# Patient Record
Sex: FEMALE | Race: WHITE | NOT HISPANIC OR LATINO | Employment: PART TIME | URBAN - METROPOLITAN AREA
[De-identification: names, ages, dates, MRNs, and addresses within clinical notes are randomized per-mention and may not be internally consistent; named-entity substitution may affect disease eponyms.]

---

## 2017-02-09 ENCOUNTER — OFFICE VISIT (OUTPATIENT)
Dept: URGENT CARE | Facility: CLINIC | Age: 42
End: 2017-02-09
Payer: COMMERCIAL

## 2017-02-09 VITALS
HEART RATE: 90 BPM | BODY MASS INDEX: 33.67 KG/M2 | OXYGEN SATURATION: 99 % | RESPIRATION RATE: 16 BRPM | SYSTOLIC BLOOD PRESSURE: 130 MMHG | TEMPERATURE: 98.5 F | WEIGHT: 215 LBS | DIASTOLIC BLOOD PRESSURE: 90 MMHG

## 2017-02-09 DIAGNOSIS — R51.9 RECURRENT HEADACHE: ICD-10-CM

## 2017-02-09 PROCEDURE — 99214 OFFICE O/P EST MOD 30 MIN: CPT | Mod: 25 | Performed by: FAMILY MEDICINE

## 2017-02-09 RX ORDER — KETOROLAC TROMETHAMINE 30 MG/ML
60 INJECTION, SOLUTION INTRAMUSCULAR; INTRAVENOUS ONCE
Qty: 2 ML | Refills: 0 | OUTPATIENT
Start: 2017-02-09 | End: 2017-02-09

## 2017-02-09 RX ORDER — KETOROLAC TROMETHAMINE 30 MG/ML
60 INJECTION, SOLUTION INTRAMUSCULAR; INTRAVENOUS ONCE
Status: COMPLETED | OUTPATIENT
Start: 2017-02-09 | End: 2017-02-09

## 2017-02-09 RX ORDER — IBUPROFEN 200 MG
800 TABLET ORAL 2 TIMES DAILY
COMMUNITY
End: 2017-05-12

## 2017-02-09 RX ORDER — SUMATRIPTAN 50 MG/1
50 TABLET, FILM COATED ORAL
Qty: 10 TAB | Refills: 3 | Status: SHIPPED | OUTPATIENT
Start: 2017-02-09 | End: 2019-03-15

## 2017-02-09 RX ADMIN — KETOROLAC TROMETHAMINE 60 MG: 30 INJECTION, SOLUTION INTRAMUSCULAR; INTRAVENOUS at 19:00

## 2017-02-09 ASSESSMENT — ENCOUNTER SYMPTOMS
EYE REDNESS: 0
LOSS OF BALANCE: 0
FACIAL SWEATING: 0
MIGRAINE HEADACHES: 0
INSOMNIA: 1
EYE PAIN: 1
BLURRED VISION: 1
EYE WATERING: 0
FEVER: 0
SCALP TENDERNESS: 0
ANOREXIA: 1
VOMITING: 0
HEADACHES: 1
SINUS PRESSURE: 0
DIZZINESS: 0
PHOTOPHOBIA: 0
NAUSEA: 0
NECK PAIN: 0
TINGLING: 0

## 2017-02-09 NOTE — MR AVS SNAPSHOT
Leslie Diaz Aravind   2017 5:45 PM   Office Visit   MRN: [id number]    Department:  [facility]   Dept Phone:  [phone]    Description:  Female : 1975   Provider:  José Miguel Mobley M.D.           Reason for Visit     Headache x 5 wks, headache on Rt. side of face behind Rt. eye, blurred vision today      Allergies as of 2017     Allergen Noted Reactions    Nkda [No Known Drug Allergy] 2008         You were diagnosed with     Recurrent headache   [916425]         Vital Signs     Blood Pressure Pulse Temperature Respirations Weight Oxygen Saturation    130/90 mmHg 90 36.9 °C (98.5 °F) 16 97.523 kg (215 lb) 99%    Smoking Status                   Never Smoker            Basic Information     Date Of Birth Sex Race Ethnicity Preferred Language    1975 Female White Non- English      Problem List              ICD-10-CM Priority Class Noted - Resolved    Depression F32.9   Unknown - Present    HTN (hypertension) I10   Unknown - Present    Hypertriglyceridemia E78.1   3/20/2013 - Present    Other specified disorder of gallbladder K82.8   2013 - Present    Abdominal adhesions K66.0   2014 - Present    HTN (hypertension) I10   2014 - Present    Pain in joint, pelvic region and thigh M25.559   2014 - Present      Health Maintenance        Date Due Completion Dates    IMM DTaP/Tdap/Td Vaccine (1 - Tdap) 3/23/1994 ---    PAP SMEAR 3/23/1996 ---    MAMMOGRAM 3/23/2015 ---    IMM INFLUENZA (1) 2016 ---            Current Immunizations     No immunizations on file.      Below and/or attached are the medications your provider expects you to take. Review all of your home medications and newly ordered medications with your provider and/or pharmacist. Follow medication instructions as directed by your provider and/or pharmacist. Please keep your medication list with you and share with your provider. Update the information when medications are discontinued,  doses are changed, or new medications (including over-the-counter products) are added; and carry medication information at all times in the event of emergency situations     Allergies:  NKDA - (reactions not documented)               Medications  Valid as of: February 09, 2017 -  7:14 PM    Generic Name Brand Name Tablet Size Instructions for use    AmLODIPine Besylate (Tab) NORVASC 10 MG Take 1 Tab by mouth every day.        Aspirin-Acetaminophen-Caffeine (Tab) EXCEDRIN 250-250-65 MG Take 1 Tab by mouth every 6 hours as needed for Headache.        Ciprofloxacin HCl (Tab) CIPRO 500 MG Take 1 Tab by mouth 2 times a day.        Dicyclomine HCl (Cap) BENTYL 10 MG Take 1 Cap by mouth 4 Times a Day,Before Meals and at Bedtime.        HydroCHLOROthiazide (Tab) HYDRODIURIL 50 MG TAKE 1 TABLET BY MOUTH EVERY DAY        Ibuprofen (Tab) MOTRIN 200 MG Take 800 mg by mouth 2 Times a Day.        Ketorolac Tromethamine (Solution) TORADOL 60 MG/2ML 2 mL by Intramuscular route Once for 1 dose.        Metoprolol Succinate (TABLET SR 24 HR) TOPROL  MG Take 1 Tab by mouth every day.        Oxycodone-Acetaminophen (Tab) PERCOCET 7.5-325 MG Take 1 Tab by mouth every four hours as needed.        Potassium Chloride Cyndi CR (Tab CR) Kdur 20 MEQ Take 1 Tab by mouth every day.        .                 Medicines prescribed today were sent to:     [facility] #[id number] - [place] - [address] [address]    Phone: [phone] Fax: [phone]    Open 24 Hours?: No      Medication refill instructions:       If your prescription bottle indicates you have medication refills left, it is not necessary to call your provider’s office. Please contact your pharmacy and they will refill your medication.    If your prescription bottle indicates you do not have any refills left, you may request refills at any time through one of the following ways: The online [employer] system (except Urgent Care), by calling your provider’s office,  or by asking your pharmacy to contact your provider’s office with a refill request. Medication refills are processed only during regular business hours and may not be available until the next business day. Your provider may request additional information or to have a follow-up visit with you prior to refilling your medication.   *Please Note: Medication refills are assigned a new Rx number when refilled electronically. Your pharmacy may indicate that no refills were authorized even though a new prescription for the same medication is available at the pharmacy. Please request the medicine by name with the pharmacy before contacting your provider for a refill.           Rumble Access Code: [email or web address]  Expires: 3/11/2017  7:14 PM    Rumble  A secure, online tool to manage your health information     [employer] Rumble® is a secure, online tool that connects you to your personalized health information from the privacy of your home -- day or night - making it very easy for you to manage your healthcare. Once the activation process is completed, you can even access your medical information using the Rumble andrea, which is available for free in the Apple Andrea store or Google Play store.     Rumble provides the following levels of access (as shown below):   My Chart Features   [facility] Doctor [facility]  Urgent  Care Non-[facility]  Primary Care  Doctor   Email your healthcare team securely and privately 24/7 X X X    Manage appointments: schedule your next appointment; view details of past/upcoming appointments X      Request prescription refills. X      View recent personal medical records, including lab and immunizations X X X X   View health record, including health history, allergies, medications X X X X   Read reports about your outpatient visits, procedures, consult and ER notes X X X X   See your discharge summary, which is a recap of your hospital and/or ER visit that includes  your diagnosis, lab results, and care plan. X X       How to register for [employer]:  1. Go to  [email or web address].  2. Click on the Sign Up Now box, which takes you to the New Member Sign Up page. You will need to provide the following information:  a. Enter your [email or web address] Access Code exactly as it appears at the top of this page. (You will not need to use this code after you’ve completed the sign-up process. If you do not sign up before the expiration date, you must request a new code.)   b. Enter your date of birth.   c. Enter your home email address.   d. Click Submit, and follow the next screen’s instructions.  3. Create a [email or web address] ID. This will be your [email or web address] login ID and cannot be changed, so think of one that is secure and easy to remember.  4. Create a [email or web address] password. You can change your password at any time.  5. Enter your Password Reset Question and Answer. This can be used at a later time if you forget your password.   6. Enter your e-mail address. This allows you to receive e-mail notifications when new information is available in [employer].  7. Click Sign Up. You can now view your health information.    For assistance activating your [employer] account, call [phone]

## 2017-02-10 NOTE — PROGRESS NOTES
Subjective:      Leslie Nazario is a 41 y.o. female who presents with Headache            Headache   This is a new problem. The current episode started more than 1 month ago. The problem occurs intermittently (2 times per week, last a few hours). The problem has been waxing and waning. The pain is located in the right unilateral region. The pain does not radiate. The pain quality is not similar to prior headaches. The quality of the pain is described as aching. The pain is at a severity of 6/10. The pain is moderate. Associated symptoms include anorexia, blurred vision, eye pain and insomnia. Pertinent negatives include no dizziness, drainage, ear pain, eye redness, eye watering, facial sweating, fever, loss of balance, nausea, neck pain, phonophobia, photophobia, scalp tenderness, sinus pressure, tingling, tinnitus or vomiting. Nothing aggravates the symptoms. She has tried NSAIDs for the symptoms. The treatment provided moderate relief. There is no history of migraine headaches or migraines in the family.       Review of Systems   Constitutional: Negative for fever.   HENT: Negative for ear pain, sinus pressure and tinnitus.    Eyes: Positive for blurred vision and pain. Negative for photophobia and redness.   Gastrointestinal: Positive for anorexia. Negative for nausea and vomiting.   Musculoskeletal: Negative for neck pain.   Neurological: Positive for headaches. Negative for dizziness, tingling and loss of balance.   Psychiatric/Behavioral: The patient has insomnia.      PMH:  has a past medical history of S/p nephrectomy (2010); Renal disorder; Pain (01-24-14); Pain; Depression; and HTN.  MEDS:   Current outpatient prescriptions:   •  ibuprofen (MOTRIN IB) 200 MG Tab, Take 800 mg by mouth 2 Times a Day., Disp: , Rfl:   •  asa/apap/caffeine (EXCEDRIN) 250-250-65 MG Tab, Take 1 Tab by mouth every 6 hours as needed for Headache., Disp: , Rfl:   •  hydrochlorothiazide (HYDRODIURIL) 50 MG Tab, TAKE 1 TABLET  BY MOUTH EVERY DAY, Disp: 30 Tab, Rfl: 0  •  oxycodone-acetaminophen (PERCOCET) 7.5-325 MG per tablet, Take 1 Tab by mouth every four hours as needed., Disp: 30 Tab, Rfl: 0  •  ciprofloxacin (CIPRO) 500 MG Tab, Take 1 Tab by mouth 2 times a day., Disp: 20 Tab, Rfl: 0  •  dicyclomine (BENTYL) 10 MG Cap, Take 1 Cap by mouth 4 Times a Day,Before Meals and at Bedtime., Disp: 30 Cap, Rfl: 3  •  potassium chloride SA (K-DUR) 20 MEQ TBCR, Take 1 Tab by mouth every day., Disp: 30 Tab, Rfl: 5  •  amlodipine (NORVASC) 10 MG TABS, Take 1 Tab by mouth every day., Disp: 30 Tab, Rfl: 5  •  metoprolol SR (TOPROL XL) 100 MG TB24, Take 1 Tab by mouth every day., Disp: 30 Tab, Rfl: 5  ALLERGIES:   Allergies   Allergen Reactions   • Nkda [No Known Drug Allergy]      SURGHX:   Past Surgical History   Procedure Laterality Date   • Lysis adhesions general  5/06   • Ir-ureteral stent antegrade       R side   • Ureteral reimplantation  4/23/08     Performed by KIMBERLY PALACIOS at SUR[facility]ERY [facility]   • Stent placement  4/23/08     Performed by KIMBERLY PALACIOS at [facility]   • Ureteroscopy       partial removal of ureter with reimplantation   • Cystoscopy stent placement  8/11/2009     Performed by KIMBERLY PALACIOS at SURGERY [facility]   • Ureteroscopy  8/11/2009     Performed by KIMBERLY PALACIOS at SURGERY [facility]   • Stone retrieval  8/11/2009     Performed by KIMBERLY PALACIOS at SUR[facility]ERY [facility]   • Stent removal  8/11/2009     Performed by KIMBERLY PALACIOS at SURGERY [facility]   • Nephrectomy laparoscopic  2010     Right- Performed by NINA CORREA at [facility]   • Jannette by laparoscopy  12/11/2013     Performed by Ihsan Dior M.D. at [facility]   • Abdominal hysterectomy total  2005     Hysterectomy, Total Abdominal   • Cholecystectomy  2013   • Appendectomy  1991   • Laparoscopy  1/27/2014     Performed by Ihsan Dior M.D. at [facility]   • Lysis adhesions general  1/27/2014     Performed by Ihsan Dior M.D. at SURGERY [facility]   • Pr nephrectomy  2010   • Tonsillectomy  2004   • Laparotomy  2005   • Laparotomy  2009     bladder and ureter reconstruction   • Other  [id number]     \"multiple procedures prior to nephrectom , stent placement/ nephrostomy tube    • Hip arthroscopy  12/18/2014     Performed by Benji Lott M.D. at SURGERY [facility]   • Femoral neck osteoplasty  12/18/2014     Performed by Benji Lott M.D. at [facility]   • Acetabular osteoplasty  12/18/2014     Performed by Benji Lott M.D. at [facility]   • Synovectomy  12/18/2014     Performed by Benji Lott M.D. at [facility]     SOCHX:  reports that she has never smoked. She has never used smokeless tobacco. She reports that she drinks alcohol. She reports that she does not use illicit drugs.  FH: Family history was reviewed, no pertinent findings to report       Objective:     /90 mmHg  Pulse 90  Temp(Src) 36.9 °C (98.5 °F)  Resp 16  Wt 97.523 kg (215 lb)  SpO2 99%     Physical Exam   Constitutional: She appears well-developed.   HENT:   Head: Normocephalic.   Right Ear: External ear normal.   Left Ear: External ear normal.   Mouth/Throat: Oropharyngeal exudate present.   Nasal congestion   Eyes: Pupils are equal, round, and reactive to light. Right eye exhibits no chemosis and no discharge. Left eye exhibits no chemosis and no discharge. Right eye exhibits normal extraocular motion and no nystagmus. Left eye exhibits normal extraocular motion and no nystagmus. Right pupil is round and reactive. Left pupil is round and reactive.   Neck: Neck supple. No thyromegaly present.   Cardiovascular: Normal rate.    Pulmonary/Chest: Effort normal. No respiratory distress.   Abdominal: Soft. She exhibits no distension. There is no tenderness. There is no guarding. "   Lymphadenopathy:     She has no cervical adenopathy.   Neurological: She is alert. She has normal strength. She displays no tremor. No cranial nerve deficit or sensory deficit. She exhibits normal muscle tone. She displays a negative Romberg sign. She displays no seizure activity. Coordination and gait normal. GCS eye subscore is 4. GCS verbal subscore is 5. GCS motor subscore is 6.   Normal rapid hand movement, upper and lower body strength    Skin: Skin is warm and dry. No erythema.   Psychiatric: She has a normal mood and affect. Her behavior is normal.               Assessment/Plan:     1. Recurrent headache  sumatriptan (IMITREX) 50 MG Tab    ketorolac (TORADOL) injection 60 mg    DISCONTINUED: ketorolac (TORADOL) 60 MG/2ML Solution     Supportive care  Push fluids  Monitor temperature  Follow-up if symptoms worsen or fail to improve  F/u with PCP  If she shows no improvement with triptan, consider neuroimaging at that time  Also recommend f/u with her eyecare provider

## 2017-05-05 ENCOUNTER — OFFICE VISIT (OUTPATIENT)
Dept: URGENT CARE | Facility: PHYSICIAN GROUP | Age: 42
End: 2017-05-05
Payer: COMMERCIAL

## 2017-05-05 VITALS
HEART RATE: 107 BPM | OXYGEN SATURATION: 97 % | WEIGHT: 210 LBS | SYSTOLIC BLOOD PRESSURE: 148 MMHG | TEMPERATURE: 100 F | BODY MASS INDEX: 32.96 KG/M2 | DIASTOLIC BLOOD PRESSURE: 90 MMHG | HEIGHT: 67 IN

## 2017-05-05 DIAGNOSIS — J22 LOWER RESP. TRACT INFECTION: ICD-10-CM

## 2017-05-05 DIAGNOSIS — R05.9 COUGH: ICD-10-CM

## 2017-05-05 LAB
FLUAV+FLUBV AG SPEC QL IA: NEGATIVE
INT CON NEG: NEGATIVE
INT CON POS: POSITIVE

## 2017-05-05 PROCEDURE — 87804 INFLUENZA ASSAY W/OPTIC: CPT | Performed by: PHYSICIAN ASSISTANT

## 2017-05-05 PROCEDURE — 99214 OFFICE O/P EST MOD 30 MIN: CPT | Performed by: PHYSICIAN ASSISTANT

## 2017-05-05 RX ORDER — ALBUTEROL SULFATE 2.5 MG/3ML
2.5 SOLUTION RESPIRATORY (INHALATION) ONCE
OUTPATIENT
Start: 2017-05-05 | End: 2017-05-06

## 2017-05-05 RX ORDER — ALBUTEROL SULFATE 2.5 MG/3ML
2.5 SOLUTION RESPIRATORY (INHALATION) ONCE
Status: COMPLETED | OUTPATIENT
Start: 2017-05-05 | End: 2017-05-05

## 2017-05-05 RX ORDER — AZITHROMYCIN 250 MG/1
TABLET, FILM COATED ORAL
Qty: 6 TAB | Refills: 0 | Status: SHIPPED | OUTPATIENT
Start: 2017-05-05 | End: 2017-05-12

## 2017-05-05 RX ORDER — METHYLPREDNISOLONE 4 MG/1
TABLET ORAL
Qty: 1 KIT | Refills: 0 | Status: SHIPPED | OUTPATIENT
Start: 2017-05-05 | End: 2017-05-12

## 2017-05-05 RX ORDER — CODEINE PHOSPHATE AND GUAIFENESIN 10; 100 MG/5ML; MG/5ML
5 SOLUTION ORAL EVERY 4 HOURS PRN
Qty: 180 ML | Refills: 0 | Status: SHIPPED | OUTPATIENT
Start: 2017-05-05 | End: 2017-05-11

## 2017-05-05 RX ADMIN — ALBUTEROL SULFATE 2.5 MG: 2.5 SOLUTION RESPIRATORY (INHALATION) at 18:00

## 2017-05-05 ASSESSMENT — ENCOUNTER SYMPTOMS: COUGH: 1

## 2017-05-06 ASSESSMENT — ENCOUNTER SYMPTOMS
TINGLING: 0
MYALGIAS: 1
DIARRHEA: 0
DIZZINESS: 0
FEVER: 0
VOMITING: 0
FLANK PAIN: 0
WHEEZING: 0
EYE REDNESS: 0
ABDOMINAL PAIN: 0
SHORTNESS OF BREATH: 1
CHILLS: 1
HEADACHES: 1
EYE DISCHARGE: 0
NECK PAIN: 0
SORE THROAT: 1
SPUTUM PRODUCTION: 1
RHINORRHEA: 0

## 2017-05-06 ASSESSMENT — COPD QUESTIONNAIRES: COPD: 0

## 2017-05-06 NOTE — PROGRESS NOTES
Subjective:      Leslie Nazario is a 42 y.o. female who presents with Cough and Other          Pt is 43 y/o female who presents with dry cough for the last 5 days. She reports that what really brought her in, is her urine had a strong odor to it this morning and she is concerned as she is s/p nephrectomy due to renal stones.   She denies any urinary symptoms, only the strong odor.   Cough  This is a new problem. Episode onset: 5 days  The problem has been waxing and waning. The problem occurs every few minutes. The cough is non-productive. Associated symptoms include chills, headaches, myalgias, a sore throat and shortness of breath. Pertinent negatives include no chest pain, ear congestion, ear pain, eye redness, fever, postnasal drip, rash, rhinorrhea or wheezing. Associated symptoms comments: Pos. SOB after coughing fit.   . The symptoms are aggravated by cold air, lying down and dust. She has tried OTC cough suppressant for the symptoms. There is no history of asthma, COPD or pneumonia.   Other  Associated symptoms include chills, congestion, coughing, headaches, myalgias and a sore throat. Pertinent negatives include no abdominal pain, chest pain, fever, neck pain, rash or vomiting.   Denies any ill contacts.     Review of Systems   Constitutional: Positive for chills. Negative for fever and malaise/fatigue.   HENT: Positive for congestion and sore throat. Negative for ear discharge, ear pain, postnasal drip and rhinorrhea.    Eyes: Negative for discharge and redness.   Respiratory: Positive for cough, sputum production and shortness of breath. Negative for wheezing.    Cardiovascular: Negative for chest pain and leg swelling.   Gastrointestinal: Negative for vomiting, abdominal pain and diarrhea.   Genitourinary: Negative for dysuria, urgency, frequency, hematuria and flank pain.   Musculoskeletal: Positive for myalgias. Negative for neck pain.   Skin: Negative for itching and rash.   Neurological:  "Positive for headaches. Negative for dizziness and tingling.          Objective:     /90 mmHg  Pulse 107  Temp(Src) 37.8 °C (100 °F)  Ht 1.702 m (5' 7\")  Wt 95.255 kg (210 lb)  BMI 32.88 kg/m2  SpO2 97%   PMH:  has a past medical history of S/p nephrectomy (2010); Renal disorder; Pain (01-24-14); Pain; Depression; and HTN.  MEDS:   Current outpatient prescriptions:   •  azithromycin (ZITHROMAX) 250 MG Tab, Take 2 tabs today, then 1 tab daily til completed., Disp: 6 Tab, Rfl: 0  •  guaifenesin-codeine (ROBITUSSIN AC) Solution oral solution, Take 5 mL by mouth every four hours as needed for Cough (May cause sedation) for up to 6 days., Disp: 180 mL, Rfl: 0  •  MethylPREDNISolone (MEDROL DOSEPAK) 4 MG Tablet Therapy Pack, UAD, Disp: 1 Kit, Rfl: 0  •  ibuprofen (MOTRIN IB) 200 MG Tab, Take 800 mg by mouth 2 Times a Day., Disp: , Rfl:   •  asa/apap/caffeine (EXCEDRIN) 250-250-65 MG Tab, Take 1 Tab by mouth every 6 hours as needed for Headache., Disp: , Rfl:   •  sumatriptan (IMITREX) 50 MG Tab, Take 1 Tab by mouth Once PRN for Migraine for up to 1 dose., Disp: 10 Tab, Rfl: 3  •  hydrochlorothiazide (HYDRODIURIL) 50 MG Tab, TAKE 1 TABLET BY MOUTH EVERY DAY, Disp: 30 Tab, Rfl: 0  •  oxycodone-acetaminophen (PERCOCET) 7.5-325 MG per tablet, Take 1 Tab by mouth every four hours as needed., Disp: 30 Tab, Rfl: 0  •  ciprofloxacin (CIPRO) 500 MG Tab, Take 1 Tab by mouth 2 times a day., Disp: 20 Tab, Rfl: 0  •  dicyclomine (BENTYL) 10 MG Cap, Take 1 Cap by mouth 4 Times a Day,Before Meals and at Bedtime., Disp: 30 Cap, Rfl: 3  •  potassium chloride SA (K-DUR) 20 MEQ TBCR, Take 1 Tab by mouth every day., Disp: 30 Tab, Rfl: 5  •  amlodipine (NORVASC) 10 MG TABS, Take 1 Tab by mouth every day., Disp: 30 Tab, Rfl: 5  •  metoprolol SR (TOPROL XL) 100 MG TB24, Take 1 Tab by mouth every day., Disp: 30 Tab, Rfl: 5    Current facility-administered medications:   •  albuterol (PROVENTIL) 2.5mg/3ml nebulizer solution 2.5 mg, " "2.5 mg, Nebulization, Once, Emre Skaggs PA-C  •  albuterol (PROVENTIL) 2.5mg/3ml nebulizer solution 2.5 mg, 2.5 mg, Nebulization, Once, Emre Skaggs PA-C  ALLERGIES:   Allergies   Allergen Reactions   • Nkda [No Known Drug Allergy]      SURGHX:   Past Surgical History   Procedure Laterality Date   • Lysis adhesions general  5/06   • Ir-ureteral stent antegrade       R side   • Ureteral reimplantation  4/23/08     Performed by KIMBERLY PALACIOS at SURGERY [facility]   • Stent placement  4/23/08     Performed by KIMBERLY PALACIOS at [facility]   • Ureteroscopy       partial removal of ureter with reimplantation   • Cystoscopy stent placement  8/11/2009     Performed by KIMBERLY PALACIOS at [facility]   • Ureteroscopy  8/11/2009     Performed by KIMBERLY PALACIOS at [facility]   • Stone retrieval  8/11/2009     Performed by KIMBERLY PALACIOS at [facility]   • Stent removal  8/11/2009     Performed by KIMBERLY PALACIOS at [facility]   • Nephrectomy laparoscopic  2010     Right- Performed by NINA CORREA at [facility]   • Jannette by laparoscopy  12/11/2013     Performed by Ihsan Dior M.D. at [facility]   • Abdominal hysterectomy total  2005     Hysterectomy, Total Abdominal   • Cholecystectomy  2013   • Appendectomy  1991   • Laparoscopy  1/27/2014     Performed by Ihsan Dior M.D. at SURGERY [facility]   • Lysis adhesions general  1/27/2014     Performed by Ihsan Dior M.D. at [facility]   • Pr nephrectomy  2010   • Tonsillectomy  2004   • Laparotomy  2005   • Laparotomy  2009     bladder and ureter reconstruction   • Other  [id number]     \"multiple procedures prior to nephrectom , stent placement/ nephrostomy tube    • Hip arthroscopy  12/18/2014     Performed by Benji Lott M.D. at [facility]   • Femoral neck osteoplasty  12/18/2014     Performed by " Benji Lott M.D. at SURGERY [facility]   • Acetabular osteoplasty  12/18/2014     Performed by Benji Lott M.D. at [facility]   • Synovectomy  12/18/2014     Performed by Benji Lott M.D. at [facility]     SOCHX:  reports that she has never smoked. She has never used smokeless tobacco. She reports that she drinks alcohol. She reports that she does not use illicit drugs.  FH: Family history was reviewed, no pertinent findings to report    Physical Exam   Constitutional: She is oriented to person, place, and time. She appears well-developed and well-nourished.   HENT:   Head: Normocephalic and atraumatic.   Mouth/Throat: No oropharyngeal exudate.   Ears- Canals clear- TM- with clear fluid effusions bilaterally.   Pos. PND, with slight erythema- without tonsillar edema or exudate.   Mild discharge noted bilaterally- to nares.      Eyes: EOM are normal. Pupils are equal, round, and reactive to light.   Neck: Normal range of motion. Neck supple.   Cardiovascular: Normal rate and regular rhythm.    No murmur heard.  Pulmonary/Chest: Effort normal. No respiratory distress. She has wheezes.   Slight exp. Wheeze to left upper lung field.    Abdominal: Soft. Bowel sounds are normal. She exhibits no distension. There is no tenderness.   Neg. CVAT     Musculoskeletal: Normal range of motion. She exhibits no tenderness.   Lymphadenopathy:     She has no cervical adenopathy.   Neurological: She is alert and oriented to person, place, and time.   Skin: Skin is warm. No rash noted.   Psychiatric: She has a normal mood and affect. Her behavior is normal.   Vitals reviewed.            POC strep and influenza: negative.   UA- WNL.   Without any evidence of infection.   Declined any imaging at this time.     Assessment/Plan:     1. Lower resp. tract infection  - azithromycin (ZITHROMAX) 250 MG Tab; Take 2 tabs today, then 1 tab daily til completed.  Dispense: 6 Tab;  Refill: 0  - guaifenesin-codeine (ROBITUSSIN AC) Solution oral solution; Take 5 mL by mouth every four hours as needed for Cough (May cause sedation) for up to 6 days.  Dispense: 180 mL; Refill: 0  - MethylPREDNISolone (MEDROL DOSEPAK) 4 MG Tablet Therapy Pack; UAD  Dispense: 1 Kit; Refill: 0    2. Cough  - albuterol (PROVENTIL) 2.5mg/3ml nebulizer solution 2.5 mg; 3 mL by Nebulization route Once.  - POCT Influenza A/B  - albuterol (PROVENTIL) 2.5mg/3ml nebulizer solution 2.5 mg; 3 mL by Nebulization route Once.  - azithromycin (ZITHROMAX) 250 MG Tab; Take 2 tabs today, then 1 tab daily til completed.  Dispense: 6 Tab; Refill: 0  - guaifenesin-codeine (ROBITUSSIN AC) Solution oral solution; Take 5 mL by mouth every four hours as needed for Cough (May cause sedation) for up to 6 days.  Dispense: 180 mL; Refill: 0  - MethylPREDNISolone (MEDROL DOSEPAK) 4 MG Tablet Therapy Pack; UAD  Dispense: 1 Kit; Refill: 0    Pt had minimal improvement with cough with breathing treatment.   Contingent ABX was written for the patient to start based on guidelines discussed. Humidification. Increase fluids. Will trial steroid for relief.      NV  was reviewed by myself-  Document  does not reveal any concerning patterns. Pt. was advised to avoid the operation of heavy machine along with driving while on such medications. Finally pt. was advised to use medication only as prescribed.   Patient given precautionary s/sx that mandate immediate follow up and evaluation in the ED. Advised of risks of not doing so.    DDX, Supportive care, and indications for immediate follow-up discussed with patient.    Instructed to return to clinic or nearest emergency department if we are not available for any change in condition, further concerns, or worsening of symptoms.    The patient demonstrated a good understanding and agreed with the treatment plan.

## 2017-05-12 ENCOUNTER — OFFICE VISIT (OUTPATIENT)
Dept: URGENT CARE | Facility: PHYSICIAN GROUP | Age: 42
End: 2017-05-12
Payer: COMMERCIAL

## 2017-05-12 ENCOUNTER — HOSPITAL ENCOUNTER (OUTPATIENT)
Dept: RADIOLOGY | Facility: MEDICAL CENTER | Age: 42
End: 2017-05-12
Attending: FAMILY MEDICINE
Payer: COMMERCIAL

## 2017-05-12 VITALS
SYSTOLIC BLOOD PRESSURE: 136 MMHG | RESPIRATION RATE: 16 BRPM | WEIGHT: 210 LBS | TEMPERATURE: 97.7 F | DIASTOLIC BLOOD PRESSURE: 90 MMHG | HEIGHT: 67 IN | HEART RATE: 74 BPM | BODY MASS INDEX: 32.96 KG/M2 | OXYGEN SATURATION: 97 %

## 2017-05-12 DIAGNOSIS — R11.2 NAUSEA AND VOMITING, INTRACTABILITY OF VOMITING NOT SPECIFIED, UNSPECIFIED VOMITING TYPE: ICD-10-CM

## 2017-05-12 DIAGNOSIS — J20.9 ACUTE BRONCHITIS WITH BRONCHOSPASM: Primary | ICD-10-CM

## 2017-05-12 DIAGNOSIS — J20.9 ACUTE BRONCHITIS WITH BRONCHOSPASM: ICD-10-CM

## 2017-05-12 PROCEDURE — 99214 OFFICE O/P EST MOD 30 MIN: CPT | Performed by: FAMILY MEDICINE

## 2017-05-12 PROCEDURE — 71020 DX-CHEST-2 VIEWS: CPT

## 2017-05-12 RX ORDER — ALBUTEROL SULFATE 90 UG/1
2 AEROSOL, METERED RESPIRATORY (INHALATION) EVERY 6 HOURS PRN
Qty: 8.5 G | Refills: 3 | Status: SHIPPED | OUTPATIENT
Start: 2017-05-12 | End: 2019-03-15

## 2017-05-12 RX ORDER — PREDNISONE 20 MG/1
40 TABLET ORAL EVERY MORNING
Qty: 12 TAB | Refills: 0 | Status: SHIPPED | OUTPATIENT
Start: 2017-05-12 | End: 2017-05-18

## 2017-05-12 RX ORDER — IPRATROPIUM BROMIDE AND ALBUTEROL SULFATE 2.5; .5 MG/3ML; MG/3ML
3 SOLUTION RESPIRATORY (INHALATION) ONCE
Status: COMPLETED | OUTPATIENT
Start: 2017-05-12 | End: 2017-05-12

## 2017-05-12 RX ADMIN — IPRATROPIUM BROMIDE AND ALBUTEROL SULFATE 3 ML: 2.5; .5 SOLUTION RESPIRATORY (INHALATION) at 10:19

## 2017-05-12 ASSESSMENT — ENCOUNTER SYMPTOMS
ABDOMINAL PAIN: 0
SPUTUM PRODUCTION: 0
HEADACHES: 1
FOCAL WEAKNESS: 0
VOMITING: 1
NAUSEA: 1
COUGH: 1
SORE THROAT: 1
CHILLS: 0
FEVER: 0
DIZZINESS: 0
ORTHOPNEA: 0

## 2017-05-12 NOTE — MR AVS SNAPSHOT
"        Leslie Nazario   2017 9:25 AM   Office Visit   MRN: [id number]    Department:  [facility]   Dept Phone:  [phone]    Description:  Female : 1975   Provider:  Paxton Younger M.D.           Reason for Visit     Cough chest tightness, vomiting, was seen on  still not better      Allergies as of 2017     Allergen Noted Reactions    Nkda [No Known Drug Allergy] 2008         You were diagnosed with     Acute bronchitis with bronchospasm   [901124]  -  Primary     Nausea and vomiting, intractability of vomiting not specified, unspecified vomiting type   [[id number]]         Vital Signs     Blood Pressure Pulse Temperature Respirations Height Weight    136/90 mmHg 74 36.5 °C (97.7 °F) 16 1.702 m (5' 7.01\") 95.255 kg (210 lb)    Body Mass Index Oxygen Saturation Smoking Status             32.88 kg/m2 97% Never Smoker          Basic Information     Date Of Birth Sex Race Ethnicity Preferred Language    1975 Female White Non- English      Problem List              ICD-10-CM Priority Class Noted - Resolved    Depression F32.9   Unknown - Present    HTN (hypertension) I10   Unknown - Present    Hypertriglyceridemia E78.1   3/20/2013 - Present    Other specified disorder of gallbladder K82.8   2013 - Present    Abdominal adhesions K66.0   2014 - Present    HTN (hypertension) I10   2014 - Present    Pain in joint, pelvic region and thigh M25.559   2014 - Present      Health Maintenance        Date Due Completion Dates    IMM DTaP/Tdap/Td Vaccine (1 - Tdap) 3/23/1994 ---    PAP SMEAR 3/23/1996 ---    MAMMOGRAM 3/23/2015 ---            Current Immunizations     No immunizations on file.      Below and/or attached are the medications your provider expects you to take. Review all of your home medications and newly ordered medications with your provider and/or pharmacist. Follow medication instructions as directed by your provider and/or pharmacist. " Please keep your medication list with you and share with your provider. Update the information when medications are discontinued, doses are changed, or new medications (including over-the-counter products) are added; and carry medication information at all times in the event of emergency situations     Allergies:  NKDA - (reactions not documented)               Medications  Valid as of: May 12, 2017 - 10:59 AM    Generic Name Brand Name Tablet Size Instructions for use    Albuterol Sulfate (Aero Soln) albuterol 108 (90 BASE) MCG/ACT Inhale 2 Puffs by mouth every 6 hours as needed for Shortness of Breath.        AmLODIPine Besylate (Tab) NORVASC 10 MG Take 1 Tab by mouth every day.        Aspirin-Acetaminophen-Caffeine (Tab) EXCEDRIN 250-250-65 MG Take 1 Tab by mouth every 6 hours as needed for Headache.        HydroCHLOROthiazide (Tab) HYDRODIURIL 50 MG TAKE 1 TABLET BY MOUTH EVERY DAY        Hydrocod Polst-Chlorphen Polst (Suspension Extended Release) TUSSIONEX 10-8 MG/5ML Take 5 mL by mouth every 12 hours as needed for Cough.        Metoprolol Succinate (TABLET SR 24 HR) TOPROL  MG Take 1 Tab by mouth every day.        Potassium Chloride Cyndi CR (Tab CR) Kdur 20 MEQ Take 1 Tab by mouth every day.        PredniSONE (Tab) DELTASONE 20 MG Take 2 Tabs by mouth every morning for 6 days.        SUMAtriptan Succinate (Tab) IMITREX 50 MG Take 1 Tab by mouth Once PRN for Migraine for up to 1 dose.        .                 Medicines prescribed today were sent to:     [facility] #[id number] - [place] - [address] [place] [address]    Phone: [phone] Fax: [phone]    Open 24 Hours?: No      Medication refill instructions:       If your prescription bottle indicates you have medication refills left, it is not necessary to call your provider’s office. Please contact your pharmacy and they will refill your medication.    If your prescription bottle indicates you do not have any refills left, you may  request refills at any time through one of the following ways: The online [employer] system (except Urgent Care), by calling your provider’s office, or by asking your pharmacy to contact your provider’s office with a refill request. Medication refills are processed only during regular business hours and may not be available until the next business day. Your provider may request additional information or to have a follow-up visit with you prior to refilling your medication.   *Please Note: Medication refills are assigned a new Rx number when refilled electronically. Your pharmacy may indicate that no refills were authorized even though a new prescription for the same medication is available at the pharmacy. Please request the medicine by name with the pharmacy before contacting your provider for a refill.        Your To Do List     Future Labs/Procedures Complete By Expires    DX-CHEST-2 VIEWS  As directed 5/12/2018         [employer] Access Code: [email or web address]  Expires: 6/11/2017 10:59 AM    [employer]  A secure, online tool to manage your health information     [employer]’s [employer]® is a secure, online tool that connects you to your personalized health information from the privacy of your home -- day or night - making it very easy for you to manage your healthcare. Once the activation process is completed, you can even access your medical information using the [employer] andrea, which is available for free in the Apple Andrea store or Google Play store.     [employer] provides the following levels of access (as shown below):   My Chart Features   [facility] Doctor [facility]  Specialists [facility]  Urgent  Care Non-[facility]  Primary Care  Doctor   Email your healthcare team securely and privately 24/7 X X X    Manage appointments: schedule your next appointment; view details of past/upcoming appointments X      Request prescription refills. X      View recent personal medical records, including lab and immunizations X X X X    View health record, including health history, allergies, medications X X X X   Read reports about your outpatient visits, procedures, consult and ER notes X X X X   See your discharge summary, which is a recap of your hospital and/or ER visit that includes your diagnosis, lab results, and care plan. X X       How to register for [employer]:  1. Go to  [email or web address].  2. Click on the Sign Up Now box, which takes you to the New Member Sign Up page. You will need to provide the following information:  a. Enter your [employer] Access Code exactly as it appears at the top of this page. (You will not need to use this code after you’ve completed the sign-up process. If you do not sign up before the expiration date, you must request a new code.)   b. Enter your date of birth.   c. Enter your home email address.   d. Click Submit, and follow the next screen’s instructions.  3. Create a [employer] ID. This will be your [employer] login ID and cannot be changed, so think of one that is secure and easy to remember.  4. Create a [email or web address] password. You can change your password at any time.  5. Enter your Password Reset Question and Answer. This can be used at a later time if you forget your password.   6. Enter your e-mail address. This allows you to receive e-mail notifications when new information is available in [employer].  7. Click Sign Up. You can now view your health information.    For assistance activating your [employer] account, call [phone]

## 2017-05-12 NOTE — PROGRESS NOTES
Subjective:      Leslie Nazario is a 42 y.o. female who presents with Cough    Chief Complaint   Patient presents with   • Cough     chest tightness, vomiting, was seen on 5/5 still not better        - This is a very pleasant 42 y.o. female with complaints of cough x 1-2 weeks. Worse at night. Hurts in chest w/ coughing. Bad coughing spells past 1 day triggering vomiting. No fever, diaphoresis exertional symptoms SOB.           ALLERGIES:  Nkda     PMH:  Past Medical History   Diagnosis Date   • S/p nephrectomy 2010     right removed   • Renal disorder      right nephrectomy 2010   • Pain 01-24-14     abd., 0/10   • Pain      right hip   • Depression    • HTN      resolved 8/10        MEDS:    Current outpatient prescriptions:   •  predniSONE (DELTASONE) 20 MG Tab, Take 2 Tabs by mouth every morning for 6 days., Disp: 12 Tab, Rfl: 0  •  Hydrocod Polst-CPM Polst ER (TUSSIONEX) 10-8 MG/5ML Suspension Extended Release, Take 5 mL by mouth every 12 hours as needed for Cough., Disp: 120 mL, Rfl: 0  •  albuterol (PROAIR HFA) 108 (90 BASE) MCG/ACT Aero Soln inhalation aerosol, Inhale 2 Puffs by mouth every 6 hours as needed for Shortness of Breath., Disp: 8.5 g, Rfl: 3  •  asa/apap/caffeine (EXCEDRIN) 250-250-65 MG Tab, Take 1 Tab by mouth every 6 hours as needed for Headache., Disp: , Rfl:   •  sumatriptan (IMITREX) 50 MG Tab, Take 1 Tab by mouth Once PRN for Migraine for up to 1 dose., Disp: 10 Tab, Rfl: 3  •  hydrochlorothiazide (HYDRODIURIL) 50 MG Tab, TAKE 1 TABLET BY MOUTH EVERY DAY, Disp: 30 Tab, Rfl: 0  •  potassium chloride SA (K-DUR) 20 MEQ TBCR, Take 1 Tab by mouth every day., Disp: 30 Tab, Rfl: 5  •  amlodipine (NORVASC) 10 MG TABS, Take 1 Tab by mouth every day., Disp: 30 Tab, Rfl: 5  •  metoprolol SR (TOPROL XL) 100 MG TB24, Take 1 Tab by mouth every day., Disp: 30 Tab, Rfl: 5    ** Past medical, social, family and surgical history documented in HPI or under PMH/PSH/FH section, otherwise it is  "contributory **             Cough  Associated symptoms include headaches and a sore throat. Pertinent negatives include no chest pain, chills or fever.       Review of Systems   Constitutional: Negative for fever and chills.   HENT: Positive for congestion and sore throat.    Respiratory: Positive for cough. Negative for sputum production.    Cardiovascular: Negative for chest pain and orthopnea.   Gastrointestinal: Positive for nausea and vomiting. Negative for abdominal pain.   Neurological: Positive for headaches. Negative for dizziness and focal weakness.          Objective:     /90 mmHg  Pulse 74  Temp(Src) 36.5 °C (97.7 °F)  Resp 16  Ht 1.702 m (5' 7.01\")  Wt 95.255 kg (210 lb)  BMI 32.88 kg/m2  SpO2 97%     Physical Exam   Constitutional: She appears well-developed. No distress.   HENT:   Head: Normocephalic and atraumatic.   Mouth/Throat: Oropharynx is clear and moist.   Eyes: Conjunctivae are normal.   Neck: Neck supple.   Cardiovascular: Regular rhythm.    No murmur heard.  Pulmonary/Chest: Effort normal and breath sounds normal.   Abdominal: Soft. There is no tenderness. There is no rebound and no guarding.   Neurological: She is alert. She exhibits normal muscle tone.   Skin: Skin is warm and dry.   Psychiatric: She has a normal mood and affect. Judgment normal.   Nursing note and vitals reviewed.              Assessment/Plan:         1. Acute bronchitis with RAD & bronchospasm   ipratropium-albuterol (DUONEB) nebulizer solution 3 mL    DX-CHEST-2 VIEWS    predniSONE (DELTASONE) 20 MG Tab    Hydrocod Polst-CPM Polst ER (TUSSIONEX) 10-8 MG/5ML Suspension Extended Release    albuterol (PROAIR HFA) 108 (90 BASE) MCG/ACT Aero Soln inhalation aerosol   2. NV         Xray: no acute findings by MY READ. RADIOLOGY READ PENDING.     Felt improved after duoneb       Dx & d/c instructions discussed w/ patient and/or family members. Follow up w/ Prvt Dr or here in 3-4 days if not getting better, sooner " if needed,  ER if worse and UC/PCP unavailable.        Possible side effects (i.e. Rash, GI upset/constipation, sedation, elevation of BP or sugars) of any medications given discussed.

## 2017-09-27 ENCOUNTER — OFFICE VISIT (OUTPATIENT)
Dept: MEDICAL GROUP | Facility: PHYSICIAN GROUP | Age: 42
End: 2017-09-27
Payer: COMMERCIAL

## 2017-09-27 VITALS
DIASTOLIC BLOOD PRESSURE: 100 MMHG | OXYGEN SATURATION: 96 % | HEART RATE: 91 BPM | TEMPERATURE: 98.6 F | BODY MASS INDEX: 36.51 KG/M2 | WEIGHT: 232.59 LBS | SYSTOLIC BLOOD PRESSURE: 142 MMHG | HEIGHT: 67 IN

## 2017-09-27 DIAGNOSIS — Z12.39 SCREENING FOR BREAST CANCER: ICD-10-CM

## 2017-09-27 DIAGNOSIS — R53.83 FATIGUE, UNSPECIFIED TYPE: ICD-10-CM

## 2017-09-27 DIAGNOSIS — E78.5 DYSLIPIDEMIA: ICD-10-CM

## 2017-09-27 DIAGNOSIS — I10 ESSENTIAL HYPERTENSION: ICD-10-CM

## 2017-09-27 DIAGNOSIS — R82.90 ABNORMAL URINE ODOR: ICD-10-CM

## 2017-09-27 PROBLEM — E66.9 OBESITY (BMI 35.0-39.9 WITHOUT COMORBIDITY): Status: ACTIVE | Noted: 2017-09-27

## 2017-09-27 PROCEDURE — 99214 OFFICE O/P EST MOD 30 MIN: CPT | Performed by: FAMILY MEDICINE

## 2017-09-27 RX ORDER — METOPROLOL SUCCINATE 25 MG/1
25 TABLET, EXTENDED RELEASE ORAL DAILY
Qty: 30 TAB | Refills: 1 | Status: SHIPPED | OUTPATIENT
Start: 2017-09-27 | End: 2017-10-31 | Stop reason: SDUPTHER

## 2017-09-27 RX ORDER — ACETAMINOPHEN 325 MG/1
650 TABLET ORAL EVERY 4 HOURS PRN
COMMUNITY
End: 2020-10-29

## 2017-09-28 NOTE — PROGRESS NOTES
Subjective:      Leslie Nazario is a 42 y.o. female who presents with Other (thyroid testing)            HPI     Patient is here after not being seen in a long time with the last visit in January 2016.    She said she was seen by her eye doctor this morning due to problems with her vision. She said just been trouble seeing. Was told she needed to see her primary care physician because he there are were findings in her eyes that would indicate she may have thyroid disorder. She said she has gained weight back that she has lost earlier this year. She has been feeling fatigued and sluggish. She has trouble focusing.    She has not been taking her blood pressure medications. She said she stopped taking them shortly after I saw her in January 2016. She was previously on hydrochlorothiazide, amlodipine and metoprolol. She has not been monitoring her blood pressures at home. Today her blood pressure is elevated. She denies any headache, dizziness, lightheadedness, chest pain, palpitations, shortness of breath, leg edema.    She has dyslipidemia which she manages with her own efforts only. The last blood work in 2015 came back improvement of the LDL cholesterol which went down from 171-141.    She also has been having problems with strong odor of her urine. She was seen at urgent care for URI in May 2017 and urine dipstick was done in urgent care which came back within normal limits. She is worried because she only has one kidney. She had right nephrectomy due to chronic pain from recurrent kidney infection and kidney stones.    She is overdue for screening mammogram.    Past medical history, past surgical history, family history reviewed-no changes    Social history reviewed-no changes    Allergies reviewed-no changes    Medications reviewed-no changes    ROS     Review of systems as per history of present illness, the rest are negative.       Objective:     /100   Pulse 91   Temp 37 °C (98.6 °F)   Ht 1.702  "m (5' 7\")   Wt 105.5 kg (232 lb 9.4 oz)   SpO2 96%   BMI 36.43 kg/m²      Physical Exam     Examined alert, awake, oriented, not in distress    Neck-supple, no lymphadenopathy, no thyromegaly  Lungs-clear to auscultation, no rales, no wheezes  Heart-regular rate and rhythm, no murmur  Abdomen-good bowel sounds, soft, nontender, no hepatosplenomegaly, no masses  Extremities-no edema, clubbing, cyanosis          Assessment/Plan:     1. Essential hypertension  Blood pressure is elevated without her medications. She has not taken her medications for almost 2 years now. I will start her back on one of the medications which is metoprolol XL and I will start her on low-dose 25 mg one tablet daily. Her blood pressure is not very high considering that she was on max doses of the medications in the past. Monitor blood pressures at home and keep her record and follow-up in a month.  - TSH; Future  - LIPID PROFILE; Future  - COMP METABOLIC PANEL; Future  - CBC WITH DIFFERENTIAL; Future  - URINALYSIS,CULTURE IF INDICATED; Future    2. Dyslipidemia  We will do follow-up blood work.  - TSH; Future  - LIPID PROFILE; Future  - COMP METABOLIC PANEL; Future  - CBC WITH DIFFERENTIAL; Future  - URINALYSIS,CULTURE IF INDICATED; Future    3. Abnormal urine odor  We will check urinalysis and culture if indicated.  - TSH; Future  - LIPID PROFILE; Future  - COMP METABOLIC PANEL; Future  - CBC WITH DIFFERENTIAL; Future  - URINALYSIS,CULTURE IF INDICATED; Future    4. Fatigue, unspecified type  We will do blood work including TSH, CMP, CBC to rule out metabolic or hematologic problems.  - TSH; Future  - LIPID PROFILE; Future  - COMP METABOLIC PANEL; Future  - CBC WITH DIFFERENTIAL; Future  - URINALYSIS,CULTURE IF INDICATED; Future    5. BMI 36.0-36.9,adult  She has gained the weight that she has lost previously. We will check TSH.  - TSH; Future  - LIPID PROFILE; Future  - COMP METABOLIC PANEL; Future  - CBC WITH DIFFERENTIAL; Future  - " URINALYSIS,CULTURE IF INDICATED; Future  - Patient identified as having weight management issue.  Appropriate orders and counseling given.    6. Screening for breast cancer  She will schedule screening mammogram.  - MA-SCREEN MAMMO W/CAD-BILAT; Future    Patient declines flu shot.      Please note that this dictation was created using voice recognition software. I have worked with consultants from the vendor as well as technical experts from [facility]  [email or web address] to optimize the interface. I have made every reasonable attempt to correct obvious errors, but I expect that there are errors of grammar and possibly content I did not discover before finalizing the note.

## 2017-10-02 ENCOUNTER — TELEPHONE (OUTPATIENT)
Dept: MEDICAL GROUP | Facility: PHYSICIAN GROUP | Age: 42
End: 2017-10-02

## 2017-10-02 DIAGNOSIS — N39.0 URINARY TRACT INFECTION WITHOUT HEMATURIA, SITE UNSPECIFIED: ICD-10-CM

## 2017-10-02 LAB
ALBUMIN SERPL-MCNC: 4.2 G/DL (ref 3.5–5.5)
ALBUMIN/GLOB SERPL: 1.7 {RATIO} (ref 1.2–2.2)
ALP SERPL-CCNC: 87 IU/L (ref 39–117)
ALT SERPL-CCNC: 9 IU/L (ref 0–32)
APPEARANCE UR: CLEAR
AST SERPL-CCNC: 13 IU/L (ref 0–40)
BACTERIA #/AREA URNS HPF: ABNORMAL /[HPF]
BACTERIA UR CULT: ABNORMAL
BACTERIA UR CULT: ABNORMAL
BASOPHILS # BLD AUTO: 0 X10E3/UL (ref 0–0.2)
BASOPHILS NFR BLD AUTO: 1 %
BILIRUB SERPL-MCNC: 0.4 MG/DL (ref 0–1.2)
BILIRUB UR QL STRIP: NEGATIVE
BUN SERPL-MCNC: 12 MG/DL (ref 6–24)
BUN/CREAT SERPL: 15 (ref 9–23)
CALCIUM SERPL-MCNC: 9.5 MG/DL (ref 8.7–10.2)
CASTS URNS MICRO: ABNORMAL
CASTS URNS QL MICRO: ABNORMAL
CHLORIDE SERPL-SCNC: 104 MMOL/L (ref 96–106)
CHOLEST SERPL-MCNC: 216 MG/DL (ref 100–199)
CO2 SERPL-SCNC: 23 MMOL/L (ref 18–29)
COLOR UR: YELLOW
COMMENT 011824: ABNORMAL
CREAT SERPL-MCNC: 0.78 MG/DL (ref 0.57–1)
CRYSTALS URNS MICRO: ABNORMAL
EOSINOPHIL # BLD AUTO: 0.2 X10E3/UL (ref 0–0.4)
EOSINOPHIL NFR BLD AUTO: 2 %
EPI CELLS #/AREA URNS HPF: ABNORMAL /HPF
ERYTHROCYTE [DISTWIDTH] IN BLOOD BY AUTOMATED COUNT: 13.5 % (ref 12.3–15.4)
GLOBULIN SER CALC-MCNC: 2.5 G/DL (ref 1.5–4.5)
GLUCOSE SERPL-MCNC: 88 MG/DL (ref 65–99)
GLUCOSE UR QL: NEGATIVE
HCT VFR BLD AUTO: 42.8 % (ref 34–46.6)
HDLC SERPL-MCNC: 52 MG/DL
HGB BLD-MCNC: 14.6 G/DL (ref 11.1–15.9)
HGB UR QL STRIP: NEGATIVE
IMM GRANULOCYTES # BLD: 0 X10E3/UL (ref 0–0.1)
IMM GRANULOCYTES NFR BLD: 1 %
IMMATURE CELLS  115398: NORMAL
KETONES UR QL STRIP: NEGATIVE
LDLC SERPL CALC-MCNC: 142 MG/DL (ref 0–99)
LEUKOCYTE ESTERASE UR QL STRIP: NEGATIVE
LYMPHOCYTES # BLD AUTO: 2.4 X10E3/UL (ref 0.7–3.1)
LYMPHOCYTES NFR BLD AUTO: 38 %
MCH RBC QN AUTO: 29.9 PG (ref 26.6–33)
MCHC RBC AUTO-ENTMCNC: 34.1 G/DL (ref 31.5–35.7)
MCV RBC AUTO: 88 FL (ref 79–97)
MICRO URNS: NORMAL
MICRO URNS: NORMAL
MONOCYTES # BLD AUTO: 0.3 X10E3/UL (ref 0.1–0.9)
MONOCYTES NFR BLD AUTO: 5 %
MORPHOLOGY BLD-IMP: NORMAL
MUCOUS THREADS URNS QL MICRO: PRESENT
NEUTROPHILS # BLD AUTO: 3.4 X10E3/UL (ref 1.4–7)
NEUTROPHILS NFR BLD AUTO: 53 %
NITRITE UR QL STRIP: NEGATIVE
NRBC BLD AUTO-RTO: NORMAL %
OTHER ANTIBIOTIC SUSC ISLT: ABNORMAL
PH UR STRIP: 7.5 [PH] (ref 5–7.5)
PLATELET # BLD AUTO: 241 X10E3/UL (ref 150–379)
POTASSIUM SERPL-SCNC: 4.3 MMOL/L (ref 3.5–5.2)
PROT SERPL-MCNC: 6.7 G/DL (ref 6–8.5)
PROT UR QL STRIP: NEGATIVE
RBC # BLD AUTO: 4.88 X10E6/UL (ref 3.77–5.28)
RBC #/AREA URNS HPF: ABNORMAL /HPF
RENAL EPI CELLS #/AREA URNS HPF: ABNORMAL /[HPF]
SODIUM SERPL-SCNC: 143 MMOL/L (ref 134–144)
SP GR UR: 1.02 (ref 1–1.03)
T VAGINALIS URNS QL MICRO: ABNORMAL
TRIGL SERPL-MCNC: 112 MG/DL (ref 0–149)
TSH SERPL DL<=0.005 MIU/L-ACNC: 1.71 UIU/ML (ref 0.45–4.5)
UNIDENT CRYS URNS QL MICRO: ABNORMAL
URINALYSIS REFLEX  377202: NORMAL
URNS CMNT MICRO: ABNORMAL
UROBILINOGEN UR STRIP-MCNC: 0.2 MG/DL (ref 0.2–1)
VLDLC SERPL CALC-MCNC: 22 MG/DL (ref 5–40)
WBC # BLD AUTO: 6.3 X10E3/UL (ref 3.4–10.8)
WBC #/AREA URNS HPF: ABNORMAL /HPF
YEAST #/AREA URNS HPF: ABNORMAL /[HPF]

## 2017-10-02 RX ORDER — CIPROFLOXACIN 500 MG/1
500 TABLET, FILM COATED ORAL 2 TIMES DAILY
Qty: 14 TAB | Refills: 0 | Status: SHIPPED | OUTPATIENT
Start: 2017-10-02 | End: 2019-03-15

## 2017-10-03 NOTE — TELEPHONE ENCOUNTER
----- Message from Love Ruiz M.D. sent at 10/2/2017  4:55 PM PDT -----  No anemia. No diabetes. Liver and kidney functions are normal. Cholesterol panel is still slightly elevated and about the same as 2 years ago. LDL or bad cholesterol 142 and needs to be below 100. HDL or good cholesterol at the right level at 52, the goal is 40 or higher. Triglycerides are normal at 112, the goal is below 150. Ten-year cardiovascular disease risk is 1.4%. We treat with cholesterol medication if 7.5% or higher. We don't need to put her on medication. Continue to avoid fatty foods, exercise regularly, lose weight, eat more fruits and vegetables, eats fish 3 times a week. Thyroid is normal.  Urine test came back she has an infection that we need to treat. I sent a prescription of antibiotics to the pharmacy for Cipro one tablet twice daily for 7 days. Increase by mouth fluids.

## 2017-10-03 NOTE — TELEPHONE ENCOUNTER
Phone Number Called: [phone] (home)     Message: LVM to call back.     Left Message for patient to call back: yes

## 2017-10-31 ENCOUNTER — OFFICE VISIT (OUTPATIENT)
Dept: MEDICAL GROUP | Facility: PHYSICIAN GROUP | Age: 42
End: 2017-10-31
Payer: COMMERCIAL

## 2017-10-31 ENCOUNTER — HOSPITAL ENCOUNTER (OUTPATIENT)
Dept: LAB | Facility: MEDICAL CENTER | Age: 42
End: 2017-10-31
Attending: FAMILY MEDICINE
Payer: COMMERCIAL

## 2017-10-31 VITALS
WEIGHT: 238 LBS | HEIGHT: 67 IN | BODY MASS INDEX: 37.35 KG/M2 | HEART RATE: 85 BPM | OXYGEN SATURATION: 98 % | TEMPERATURE: 97.8 F | SYSTOLIC BLOOD PRESSURE: 138 MMHG | DIASTOLIC BLOOD PRESSURE: 88 MMHG | RESPIRATION RATE: 16 BRPM

## 2017-10-31 DIAGNOSIS — R22.0 SUBMANDIBULAR SWELLING: ICD-10-CM

## 2017-10-31 DIAGNOSIS — I10 ESSENTIAL HYPERTENSION: ICD-10-CM

## 2017-10-31 DIAGNOSIS — M25.50 MULTIPLE JOINT PAIN: ICD-10-CM

## 2017-10-31 DIAGNOSIS — N39.0 URINARY TRACT INFECTION WITHOUT HEMATURIA, SITE UNSPECIFIED: ICD-10-CM

## 2017-10-31 DIAGNOSIS — R22.1 SUBMANDIBULAR SWELLING: ICD-10-CM

## 2017-10-31 DIAGNOSIS — E78.5 DYSLIPIDEMIA: ICD-10-CM

## 2017-10-31 LAB
ERYTHROCYTE [SEDIMENTATION RATE] IN BLOOD BY WESTERGREN METHOD: 21 MM/HOUR (ref 0–20)
RHEUMATOID FACT SER IA-ACNC: <10 IU/ML (ref 0–14)

## 2017-10-31 PROCEDURE — 87086 URINE CULTURE/COLONY COUNT: CPT

## 2017-10-31 PROCEDURE — 86200 CCP ANTIBODY: CPT

## 2017-10-31 PROCEDURE — 86039 ANTINUCLEAR ANTIBODIES (ANA): CPT

## 2017-10-31 PROCEDURE — 99214 OFFICE O/P EST MOD 30 MIN: CPT | Performed by: FAMILY MEDICINE

## 2017-10-31 PROCEDURE — 36415 COLL VENOUS BLD VENIPUNCTURE: CPT

## 2017-10-31 PROCEDURE — 86140 C-REACTIVE PROTEIN: CPT

## 2017-10-31 PROCEDURE — 86038 ANTINUCLEAR ANTIBODIES: CPT

## 2017-10-31 PROCEDURE — 86431 RHEUMATOID FACTOR QUANT: CPT

## 2017-10-31 PROCEDURE — 85652 RBC SED RATE AUTOMATED: CPT

## 2017-10-31 RX ORDER — METOPROLOL SUCCINATE 25 MG/1
25 TABLET, EXTENDED RELEASE ORAL DAILY
Qty: 30 TAB | Refills: 5 | Status: SHIPPED | OUTPATIENT
Start: 2017-10-31 | End: 2019-03-15

## 2017-10-31 RX ORDER — HYDROCHLOROTHIAZIDE 12.5 MG/1
12.5 TABLET ORAL DAILY
Qty: 30 TAB | Refills: 5 | Status: SHIPPED | OUTPATIENT
Start: 2017-10-31 | End: 2019-03-15

## 2017-10-31 ASSESSMENT — PAIN SCALES - GENERAL: PAINLEVEL: NO PAIN

## 2017-10-31 ASSESSMENT — PATIENT HEALTH QUESTIONNAIRE - PHQ9: CLINICAL INTERPRETATION OF PHQ2 SCORE: 0

## 2017-11-01 LAB — CRP SERPL HS-MCNC: 0.61 MG/DL (ref 0–0.75)

## 2017-11-01 NOTE — PROGRESS NOTES
"Subjective:      Leslie Nazario is a 42 y.o. female who presents with Follow-Up and Results (labs)            HPI     Patient is back for follow-up.    We diagnosed her with UTI. Her only symptom was foul odor of the urine. Her culture grew out Escherichia coli. She got Cipro which the bacteria were sensitive to. She said abnormal smell resolved. She denies any urinary symptoms at this time. She however continues to feel fatigued. She has been having episodes of very sick with joints hurting all over the body. She said she did not notice any redness of the joints but her toes look like sausage because of the swelling and her finger joints are very tender to pressure. She already has 4 episodes this month with the most recent one last week. These episodes usually last about 2 days. No fever or chills noted at this time. When she has these episodes she feels swelling in the right submandibular area. She has missed work because of these episodes.    Since she had her right nephrectomy in 2010, she said she has not been feeling back to her baseline. She said she has always been fatigued since the surgery..    In terms of her hypertension, we started her back on metoprolol XL 25 mg one tablet daily. Her blood pressure improved but not yet adequately controlled. Lately she has noticed tightness in her legs as if she is having water retention noted at the end of the day and worsening in the evening and at night.    She did blood work prior to this visit.    Past medical history, past surgical history, family history reviewed-no changes    Social history reviewed-no changes    Allergies reviewed-no changes    Medications reviewed-no changes    ROS     Review of systems as per history of present illness, the rest are negative.       Objective:     /88   Pulse 85   Temp 36.6 °C (97.8 °F)   Resp 16   Ht 1.702 m (5' 7\")   Wt 108 kg (238 lb)   SpO2 98%   Breastfeeding? No   BMI 37.28 kg/m²      Physical Exam "     Examined alert, awake, oriented, not in distress    Neck-supple, no lymphadenopathy, no thyromegaly, she has prominent and palpable nontender right submandibular gland but no palpable lymph nodes or masses  Lungs-clear to auscultation, no rales, no wheezes  Heart-regular rate and rhythm, no murmur  Extremities-no edema, clubbing, cyanosis,      No visits with results within 1 Month(s) from this visit.   Latest known visit with results is:   Orders Only on 09/29/2017   Component Date Value   • WBC 10/02/2017 6.3    • RBC 10/02/2017 4.88    • Hemoglobin 10/02/2017 14.6    • Hematocrit 10/02/2017 42.8    • MCV 10/02/2017 88    • MCH 10/02/2017 29.9    • MCHC 10/02/2017 34.1    • RDW 10/02/2017 13.5    • Platelet Count 10/02/2017 241    • Neutrophils-Polys 10/02/2017 53    • Lymphocytes 10/02/2017 38    • Monocytes 10/02/2017 5    • Eosinophils 10/02/2017 2    • Basophils 10/02/2017 1    • Immature Cells 10/02/2017 CANCELED    • Neutrophils (Absolute) 10/02/2017 3.4    • Lymphs (Absolute) 10/02/2017 2.4    • Monos (Absolute) 10/02/2017 0.3    • Eos (Absolute) 10/02/2017 0.2    • Baso (Absolute) 10/02/2017 0.0    • Immature Granulocytes 10/02/2017 1    • Immature Granulocytes (a* 10/02/2017 0.0    • Nucleated RBC 10/02/2017 CANCELED    • Comments-Diff 10/02/2017 CANCELED    • Glucose 10/02/2017 88    • Bun 10/02/2017 12    • Creatinine 10/02/2017 0.78    • GFR If Non  Ameri* 10/02/2017 94    • GFR If  10/02/2017 108    • Bun-Creatinine Ratio 10/02/2017 15    • Sodium 10/02/2017 143    • Potassium 10/02/2017 4.3    • Chloride 10/02/2017 104    • Co2 10/02/2017 23    • Calcium 10/02/2017 9.5    • Total Protein 10/02/2017 6.7    • Albumin 10/02/2017 4.2    • Globulin 10/02/2017 2.5    • A-G Ratio 10/02/2017 1.7    • Total Bilirubin 10/02/2017 0.4    • Alkaline Phosphatase 10/02/2017 87    • AST(SGOT) 10/02/2017 13    • ALT(SGPT) 10/02/2017 9    • Specific Gravity 10/02/2017 1.016    • Ph  10/02/2017 7.5    • Color 10/02/2017 Yellow    • Character 10/02/2017 Clear    • Leukocyte Esterase 10/02/2017 Negative    • Protein 10/02/2017 Negative    • Glucose 10/02/2017 Negative    • Ketones 10/02/2017 Negative    • Occult Blood 10/02/2017 Negative    • Bilirubin 10/02/2017 Negative    • Urobilinogen Semi Qnt 10/02/2017 0.2    • Nitrite 10/02/2017 Negative    • Microscopic Exam 10/02/2017 Comment    • Microscopic Exam 10/02/2017 See below:    • Urinalysis Reflex 10/02/2017 Comment    • WBC 10/02/2017 0-5    • RBC 10/02/2017 0-2    • Epithelial Cells Non Gustabo* 10/02/2017 0-10    • Epithelial Cells Renal 10/02/2017 CANCELED    • Urine Casts 10/02/2017 CANCELED    • Cast Type 10/02/2017 CANCELED    • Urine Crystals 10/02/2017 CANCELED    • Crystal Type 10/02/2017 CANCELED    • Mucous Threads 10/02/2017 Present    • Bacteria 10/02/2017 Moderate*   • Yeast Cells 10/02/2017 CANCELED    • Trichomonas 10/02/2017 CANCELED    • Comment 10/02/2017 CANCELED    • Urine Culture 10/02/2017 Final report*   • Result 1 10/02/2017 Escherichia coli*   • Susceptibility 10/02/2017 Comment    • Cholesterol,Tot 10/02/2017 216*   • Triglycerides 10/02/2017 112    • HDL 10/02/2017 52    • VLDL Cholesterol Calc 10/02/2017 22    • LDL 10/02/2017 142*   • Comment: 10/02/2017 CANCELED    • TSH 10/02/2017 1.710         Assessment/Plan:     1. Essential hypertension  Adequate control. She has been having tightness in her legs which she feels is due to water retention at the end of the day and at night. I will add hydrochlorothiazide 12.5 mg one tablet daily. She was on this medication before with other agents for hypertension. Monitor blood pressure at home and keep a record.  - hydrochlorothiazide (HYDRODIURIL) 12.5 MG tablet; Take 1 Tab by mouth every day.  Dispense: 30 Tab; Refill: 5  - metoprolol SR (TOPROL XL) 25 MG TABLET SR 24 HR; Take 1 Tab by mouth every day.  Dispense: 30 Tab; Refill: 5    2. Multiple joint pain  We will get blood  work to check ProcalAmine disease/inflammatory arthritis.  - WESTERGREN SED RATE; Future  - RHEUMATOID ARTHRITIS FACTOR; Future  - CRP QUANTITIVE (NON-CARDIAC); Future  - ANTI-NUCLEAR ANTIBODY SERUM; Future  - CCP ANTIBODY; Future    3. Submandibular swelling  I will send her for ultrasound of the neck.  - US-SOFT TISSUES OF HEAD - NECK; Future    4. Dyslipidemia  Her LDL is high. Her 10 year cardiovascular disease risk is low at 1.4% which means she does not need to be on statin. She will manage this with her own efforts including diet, exercise and weight loss.    5. Urinary tract infection without hematuria, site unspecified  We treated her for UTI did her only symptom was of normal smell of the urine. She still has ongoing for the. I will redo the culture to make sure the infection is cleared.  - URINE CULTURE(NEW); Future      Please note that this dictation was created using voice recognition software. I have worked with consultants from the vendor as well as technical experts from [facility]  xaitment to optimize the interface. I have made every reasonable attempt to correct obvious errors, but I expect that there are errors of grammar and possibly content I did not discover before finalizing the note.

## 2017-11-02 LAB
BACTERIA UR CULT: NORMAL
SIGNIFICANT IND 70042: NORMAL
SITE SITE: NORMAL
SOURCE SOURCE: NORMAL

## 2017-11-03 ENCOUNTER — TELEPHONE (OUTPATIENT)
Dept: MEDICAL GROUP | Facility: PHYSICIAN GROUP | Age: 42
End: 2017-11-03

## 2017-11-03 LAB
CCP IGG SERPL-ACNC: 5 UNITS (ref 0–19)
NUCLEAR IGG SER QL IA: DETECTED
NUCLEAR IGG TITR SER IF: ABNORMAL {TITER}

## 2017-11-03 NOTE — TELEPHONE ENCOUNTER
VOICEMAIL  1. Caller Name: Leslie Emily Clinton                        Call Back Number: [phone] (home)       2. Message: Called and left patient a message to call back to get lab results. LM     3. Patient approves office to leave a detailed voicemail/MyChart message: N\A

## 2017-11-03 NOTE — TELEPHONE ENCOUNTER
PBX called me directly, as our PAR line is not set up at the moment?  I called pt back and gave her results of Urine and also let her know the other lab was still in process.   She said ok.     OH

## 2017-11-06 ENCOUNTER — TELEPHONE (OUTPATIENT)
Dept: MEDICAL GROUP | Facility: PHYSICIAN GROUP | Age: 42
End: 2017-11-06

## 2017-11-06 NOTE — TELEPHONE ENCOUNTER
----- Message from Love Ruiz M.D. sent at 11/6/2017 12:14 PM PST -----  Blood work to check for autoimmune disease such as rheumatoid arthritis or lupus came back negative. I recommend that if she has another episode of feeling sick with flulike symptoms to call us so we can get her in that day and get evaluated.

## 2017-11-06 NOTE — TELEPHONE ENCOUNTER
VOICEMAIL  1. Caller Name: Leslie Emily Clinton                        Call Back Number: [phone] (home)       2. Message: Called and left patient a message to call back to get lab results. LM     3. Patient approves office to leave a detailed voicemail/MyChart message: N\A

## 2017-11-09 ENCOUNTER — APPOINTMENT (OUTPATIENT)
Dept: MEDICAL GROUP | Facility: PHYSICIAN GROUP | Age: 42
End: 2017-11-09
Payer: COMMERCIAL

## 2017-11-10 ENCOUNTER — HOSPITAL ENCOUNTER (OUTPATIENT)
Dept: RADIOLOGY | Facility: MEDICAL CENTER | Age: 42
End: 2017-11-10
Attending: FAMILY MEDICINE
Payer: COMMERCIAL

## 2017-11-10 DIAGNOSIS — R22.1 SUBMANDIBULAR SWELLING: ICD-10-CM

## 2017-11-10 DIAGNOSIS — R22.0 SUBMANDIBULAR SWELLING: ICD-10-CM

## 2017-11-10 PROCEDURE — 76536 US EXAM OF HEAD AND NECK: CPT

## 2017-11-13 ENCOUNTER — TELEPHONE (OUTPATIENT)
Dept: MEDICAL GROUP | Facility: PHYSICIAN GROUP | Age: 42
End: 2017-11-13

## 2017-11-13 DIAGNOSIS — R59.0 SUBMANDIBULAR LYMPHADENOPATHY: ICD-10-CM

## 2017-11-13 NOTE — LETTER
November 17, 2017        Leslie Nazario  [address] [place] [address]        Dear Leslie:    Our office has been unable to reach you by phone.  Please contact us at your earliest convenience.  Thank you.         Sincerely,        Love Ruiz M.D.    Electronically Signed

## 2017-11-13 NOTE — TELEPHONE ENCOUNTER
----- Message from Love Ruiz M.D. sent at 11/13/2017  6:54 AM PST -----  Ultrasound of the neck came back normal size lymph nodes in both sides of the neck. One of them 1.2 cm in size which corresponds to the palpable lump in the right side of the neck. No other solid mass or cysts noted.  I will refer you to ENT to get this further evaluated to see if this may need biopsy. Referral placed.

## 2017-11-13 NOTE — TELEPHONE ENCOUNTER
Phone Number Called: [phone] (home)     Message: LVM to call back.     Left Message for patient to call back: yes

## 2017-11-15 ENCOUNTER — TELEPHONE (OUTPATIENT)
Dept: MEDICAL GROUP | Facility: PHYSICIAN GROUP | Age: 42
End: 2017-11-15

## 2017-11-15 NOTE — TELEPHONE ENCOUNTER
Phone Number Called: [phone] (home)     Message: LVM to call back.     Left Message for patient to call back: yes

## 2017-11-17 NOTE — TELEPHONE ENCOUNTER
Phone Number Called: [phone] (home)     Message: ASHER to call back.  Letter mailed to patient requesting she contact our office for results.     Left Message for patient to call back: yes

## 2018-05-25 ENCOUNTER — HOSPITAL ENCOUNTER (OUTPATIENT)
Dept: RADIOLOGY | Facility: MEDICAL CENTER | Age: 43
End: 2018-05-25
Attending: PHYSICIAN ASSISTANT
Payer: COMMERCIAL

## 2018-05-25 ENCOUNTER — OFFICE VISIT (OUTPATIENT)
Dept: URGENT CARE | Facility: PHYSICIAN GROUP | Age: 43
End: 2018-05-25
Payer: COMMERCIAL

## 2018-05-25 VITALS
SYSTOLIC BLOOD PRESSURE: 164 MMHG | OXYGEN SATURATION: 96 % | WEIGHT: 225 LBS | HEART RATE: 100 BPM | DIASTOLIC BLOOD PRESSURE: 112 MMHG | HEIGHT: 67 IN | TEMPERATURE: 98.4 F | BODY MASS INDEX: 35.31 KG/M2

## 2018-05-25 DIAGNOSIS — S20.221A CONTUSION OF RIGHT SIDE OF BACK, INITIAL ENCOUNTER: ICD-10-CM

## 2018-05-25 DIAGNOSIS — S39.92XA INJURY OF BACK, INITIAL ENCOUNTER: ICD-10-CM

## 2018-05-25 PROCEDURE — 72070 X-RAY EXAM THORAC SPINE 2VWS: CPT

## 2018-05-25 PROCEDURE — 74176 CT ABD & PELVIS W/O CONTRAST: CPT

## 2018-05-25 PROCEDURE — 72100 X-RAY EXAM L-S SPINE 2/3 VWS: CPT

## 2018-05-25 PROCEDURE — 99214 OFFICE O/P EST MOD 30 MIN: CPT | Performed by: PHYSICIAN ASSISTANT

## 2018-05-25 RX ORDER — HYDROCODONE BITARTRATE AND ACETAMINOPHEN 5; 325 MG/1; MG/1
1 TABLET ORAL EVERY 4 HOURS PRN
Qty: 20 TAB | Refills: 0 | Status: SHIPPED | OUTPATIENT
Start: 2018-05-25 | End: 2018-06-04

## 2018-05-25 RX ORDER — IBUPROFEN 200 MG
200 TABLET ORAL EVERY 6 HOURS PRN
COMMUNITY
End: 2020-10-06

## 2018-05-25 RX ORDER — KETOROLAC TROMETHAMINE 30 MG/ML
60 INJECTION, SOLUTION INTRAMUSCULAR; INTRAVENOUS ONCE
Status: COMPLETED | OUTPATIENT
Start: 2018-05-25 | End: 2018-05-25

## 2018-05-25 RX ADMIN — KETOROLAC TROMETHAMINE 60 MG: 30 INJECTION, SOLUTION INTRAMUSCULAR; INTRAVENOUS at 10:35

## 2018-05-25 ASSESSMENT — PAIN SCALES - GENERAL: PAINLEVEL: 8=MODERATE-SEVERE PAIN

## 2018-05-26 ASSESSMENT — ENCOUNTER SYMPTOMS
CHILLS: 0
DIARRHEA: 0
FALLS: 1
BOWEL INCONTINENCE: 0
ABDOMINAL PAIN: 0
NUMBNESS: 0
DIZZINESS: 0
FEVER: 0
BACK PAIN: 1
TINGLING: 1
NAUSEA: 0
VOMITING: 0
SHORTNESS OF BREATH: 0

## 2018-05-26 NOTE — PROGRESS NOTES
"Subjective:      Leslie Nazario is a 43 y.o. female who presents with Back Pain (right side pain mid to low back. fall down stairs at 4am this am/)        Patient is accompanied by her daughter.     Back Pain   This is a new problem. The current episode started today. The problem occurs constantly. The problem is unchanged. The pain is present in the thoracic spine. The quality of the pain is described as aching. The pain is at a severity of 8/10. The pain is severe. The symptoms are aggravated by bending, standing and twisting. Associated symptoms include tingling. Pertinent negatives include no abdominal pain, bladder incontinence, bowel incontinence, chest pain, fever or numbness. She has tried NSAIDs for the symptoms. The treatment provided no relief.     Patient presents to urgent care reporting right sided mid-back pain starting this morning around 4 am after falling down stairs at her house. She was wearing socks and walking in the dark and missed a step and fell backwards onto the stairs. Since the incident she has had severe pain on the right side of her back, with a tingling sensation radiating around her right abdomen. No history of chronic back injuries or surgeries. PMH is significant for a right nephrectomy.     Review of Systems   Constitutional: Negative for chills and fever.   HENT: Negative for congestion.    Respiratory: Negative for shortness of breath.    Cardiovascular: Negative for chest pain.   Gastrointestinal: Negative for abdominal pain, bowel incontinence, diarrhea, nausea and vomiting.   Genitourinary: Negative.  Negative for bladder incontinence.   Musculoskeletal: Positive for back pain and falls.   Neurological: Positive for tingling. Negative for dizziness and numbness.          Objective:     BP (!) 164/112   Pulse 100   Temp 36.9 °C (98.4 °F)   Ht 1.702 m (5' 7\")   Wt 102.1 kg (225 lb)   SpO2 96%   BMI 35.24 kg/m²      Physical Exam   Constitutional: She is oriented to " person, place, and time. She appears well-developed and well-nourished. No distress.   HENT:   Head: Normocephalic and atraumatic.   Eyes: Conjunctivae are normal. Pupils are equal, round, and reactive to light.   Neck: Normal range of motion.   Cardiovascular: Normal rate.    Pulmonary/Chest: Effort normal.   Musculoskeletal: Normal range of motion.        Back:    No midline spinal tenderness or step off deformities notes. Significant tenderness to light palpation over right thoracic paraspinal area.    Neurological: She is alert and oriented to person, place, and time.   Skin: Skin is warm and dry. She is not diaphoretic.   Psychiatric: She has a normal mood and affect. Her behavior is normal.   Nursing note and vitals reviewed.         PMH:  has a past medical history of Depression; HTN; Pain (01-24-14); Pain; Renal disorder; and S/p nephrectomy (2010).  MEDS:   Current Outpatient Prescriptions:   •  ibuprofen (MOTRIN) 200 MG Tab, Take 200 mg by mouth every 6 hours as needed., Disp: , Rfl:   •  HYDROcodone-acetaminophen (NORCO) 5-325 MG Tab per tablet, Take 1 Tab by mouth every four hours as needed for up to 10 days., Disp: 20 Tab, Rfl: 0  •  albuterol (PROAIR HFA) 108 (90 BASE) MCG/ACT Aero Soln inhalation aerosol, Inhale 2 Puffs by mouth every 6 hours as needed for Shortness of Breath., Disp: 8.5 g, Rfl: 3  •  metoprolol SR (TOPROL XL) 25 MG TABLET SR 24 HR, TAKE 1 TAB BY MOUTH EVERY DAY., Disp: 30 Tab, Rfl: 5  •  hydrochlorothiazide (HYDRODIURIL) 12.5 MG tablet, Take 1 Tab by mouth every day., Disp: 30 Tab, Rfl: 5  •  metoprolol SR (TOPROL XL) 25 MG TABLET SR 24 HR, Take 1 Tab by mouth every day., Disp: 30 Tab, Rfl: 5  •  ciprofloxacin (CIPRO) 500 MG Tab, Take 1 Tab by mouth 2 times a day., Disp: 14 Tab, Rfl: 0  •  acetaminophen (TYLENOL) 325 MG Tab, Take 650 mg by mouth every four hours as needed., Disp: , Rfl:   •  Hydrocod Polst-CPM Polst ER (TUSSIONEX) 10-8 MG/5ML Suspension Extended Release, Take 5 mL by  mouth every 12 hours as needed for Cough., Disp: 120 mL, Rfl: 0  •  asa/apap/caffeine (EXCEDRIN) 250-250-65 MG Tab, Take 1 Tab by mouth every 6 hours as needed for Headache., Disp: , Rfl:   •  sumatriptan (IMITREX) 50 MG Tab, Take 1 Tab by mouth Once PRN for Migraine for up to 1 dose., Disp: 10 Tab, Rfl: 3  •  hydrochlorothiazide (HYDRODIURIL) 50 MG Tab, TAKE 1 TABLET BY MOUTH EVERY DAY, Disp: 30 Tab, Rfl: 0  •  potassium chloride SA (K-DUR) 20 MEQ TBCR, Take 1 Tab by mouth every day., Disp: 30 Tab, Rfl: 5  •  amlodipine (NORVASC) 10 MG TABS, Take 1 Tab by mouth every day., Disp: 30 Tab, Rfl: 5  •  metoprolol SR (TOPROL XL) 100 MG TB24, Take 1 Tab by mouth every day., Disp: 30 Tab, Rfl: 5  ALLERGIES:   Allergies   Allergen Reactions   • Nkda [No Known Drug Allergy]      SURGHX:   Past Surgical History:   Procedure Laterality Date   • HIP ARTHROSCOPY  12/18/2014    Performed by Benji Lott M.D. at [facility]   • FEMORAL NECK OSTEOPLASTY  12/18/2014    Performed by Benji Lott M.D. at [facility]   • ACETABULAR OSTEOPLASTY  12/18/2014    Performed by Benji Lott M.D. at [facility]   • SYNOVECTOMY  12/18/2014    Performed by Benji Lott M.D. at [facility]   • LAPAROSCOPY  1/27/2014    Performed by Ihsan Dior M.D. at [facility]   • LYSIS ADHESIONS GENERAL  1/27/2014    Performed by Ihsan Dior M.D. at [facility]   • CASSANDRA BY LAPAROSCOPY  12/11/2013    Performed by Ihsan Dior M.D. at [facility]   • CHOLECYSTECTOMY  2013   • NEPHRECTOMY LAPAROSCOPIC  2010    Right- Performed by NINA CORREA at [facility]   • PB NEPHRECTOMY  2010   • CYSTOSCOPY STENT PLACEMENT  8/11/2009    Performed by KIMBERLY PALACIOS at [facility]   • URETEROSCOPY  8/11/2009    Performed by KIMBERLY PALACIOS at [facility]   • STONE RETRIEVAL  8/11/2009    "   Performed by KIMBERLY PALACIOS at SUR[facility]   • STENT REMOVAL  8/11/2009    Performed by KIMBERLY PALACIOS at [facility]   • LAPAROTOMY  2009    bladder and ureter reconstruction   • URETERAL REIMPLANTATION  4/23/08    Performed by KIMBERLY PALACIOS at [facility]   • STENT PLACEMENT  4/23/08    Performed by KIMBERLY PALACIOS at [facility]   • LYSIS ADHESIONS GENERAL  5/06   • ABDOMINAL HYSTERECTOMY TOTAL  2005    Hysterectomy, Total Abdominal   • LAPAROTOMY  2005   • TONSILLECTOMY  2004   • APPENDECTOMY  1991   • IR-URETERAL STENT ANTEGRADE      R side   • OTHER  [id number]    \"multiple procedures prior to nephrectom , stent placement/ nephrostomy tube    • URETEROSCOPY      partial removal of ureter with reimplantation     SOCHX:  reports that she has never smoked. She has never used smokeless tobacco. She reports that she drinks alcohol. She reports that she does not use drugs.  FH: family history includes Genitourinary () in her brother; Hypertension in her brother, father, and mother.       Assessment/Plan:     1. Contusion of right side of back, initial encounter  - DX-LUMBAR SPINE-2 OR 3 VIEWS; Future  Impression       1.  No compression deformity or acute fracture is identified.    2.  Mild retrolisthesis at L2-S1.     - DX-THORACIC SPINE-2 VIEWS; Future  Impression       Unremarkable thoracic spine.     - CT-RENAL COLIC EVALUATION(A/P W/O); Future  Impression         1.  NO ACUTE ABNORMALITIES ARE IDENTIFIED IN THE ABDOMEN OR PELVIS. NO HYDRONEPHROSIS OR URINARY TRACT CALCULI.    2.  POST RIGHT NEPHRECTOMY.    3.  POST CHOLECYSTECTOMY.     - ketorolac (TORADOL) injection 60 mg; 2 mL by Intramuscular route Once.   - Given in urgent care with mild relief of symptoms  - HYDROcodone-acetaminophen (NORCO) 5-325 MG Tab per tablet; Take 1 Tab by mouth every four hours as needed for up to 10 days.  Dispense: 20 Tab; Refill: 0   - Will cause sedation, avoid " driving, operating heavy machinery, and drinking alcohol    - CONSENT FOR OPIATE PRESCRIPTION    Encouraged patient to monitory symptoms closely and RTC or go to the ED for any new/worsening symptoms. The patient demonstrated a good understanding and agreed with the treatment plan.

## 2018-11-07 ENCOUNTER — NON-PROVIDER VISIT (OUTPATIENT)
Dept: OCCUPATIONAL MEDICINE | Facility: CLINIC | Age: 43
End: 2018-11-07

## 2018-11-07 DIAGNOSIS — Z02.1 PRE-EMPLOYMENT DRUG SCREENING: ICD-10-CM

## 2018-11-07 DIAGNOSIS — Z02.1 DRUG TESTING, PRE-EMPLOYMENT: ICD-10-CM

## 2018-11-07 LAB
AMP AMPHETAMINE: NORMAL
COC COCAINE: NORMAL
INT CON NEG: NORMAL
INT CON POS: NORMAL
MET METHAMPHETAMINES: NORMAL
OPI OPIATES: NORMAL
PCP PHENCYCLIDINE: NORMAL
POC DRUG COMMENT 753798-OCCUPATIONAL HEALTH: NEGATIVE
THC: NORMAL

## 2018-11-07 PROCEDURE — 80305 DRUG TEST PRSMV DIR OPT OBS: CPT | Performed by: PREVENTIVE MEDICINE

## 2019-03-15 ENCOUNTER — OFFICE VISIT (OUTPATIENT)
Dept: URGENT CARE | Facility: CLINIC | Age: 44
End: 2019-03-15
Payer: COMMERCIAL

## 2019-03-15 VITALS
DIASTOLIC BLOOD PRESSURE: 94 MMHG | HEIGHT: 67 IN | HEART RATE: 87 BPM | TEMPERATURE: 97 F | OXYGEN SATURATION: 97 % | SYSTOLIC BLOOD PRESSURE: 134 MMHG | BODY MASS INDEX: 35.31 KG/M2 | RESPIRATION RATE: 16 BRPM | WEIGHT: 225 LBS

## 2019-03-15 DIAGNOSIS — J06.9 UPPER RESPIRATORY INFECTION WITH COUGH AND CONGESTION: ICD-10-CM

## 2019-03-15 PROCEDURE — 99214 OFFICE O/P EST MOD 30 MIN: CPT | Performed by: NURSE PRACTITIONER

## 2019-03-15 RX ORDER — CODEINE PHOSPHATE AND GUAIFENESIN 10; 100 MG/5ML; MG/5ML
5-10 SOLUTION ORAL
Qty: 120 ML | Refills: 0 | Status: SHIPPED | OUTPATIENT
Start: 2019-03-15 | End: 2019-03-30

## 2019-03-15 RX ORDER — DEXTROMETHORPHAN HBR AND GUAIFENESIN 5; 100 MG/5ML; MG/5ML
LIQUID ORAL
COMMUNITY
End: 2020-10-06

## 2019-03-15 RX ORDER — BENZONATATE 100 MG/1
100 CAPSULE ORAL 3 TIMES DAILY PRN
Qty: 30 CAP | Refills: 0 | Status: SHIPPED | OUTPATIENT
Start: 2019-03-15 | End: 2019-06-21

## 2019-03-15 RX ORDER — FLUTICASONE PROPIONATE 50 MCG
1 SPRAY, SUSPENSION (ML) NASAL 2 TIMES DAILY
Qty: 16 G | Refills: 0 | Status: SHIPPED | OUTPATIENT
Start: 2019-03-15 | End: 2019-06-21

## 2019-03-16 NOTE — PROGRESS NOTES
Chief Complaint   Patient presents with   • Pharyngitis     cough,bodyache,tiredness, sore throat, neck sore, cant breath at night because her cough gets bad x5days       HISTORY OF PRESENT ILLNESS: Patient is a 43 y.o. female who presents today due to symptoms that started five days ago. Pt reports a productive cough. Reports associated nasal congestion, sinus pressure, mild sore throat, bilateral ear pressure, fever, chills, fatigue, malaise, and body aches. Denies chest pain, shortness of breath, or wheezing. Denies h/o asthma/copd/CAP. No immunocompromise. Has tried OTC cold medications without significant relief of symptoms. No recent ABX use. No other aggravating or alleviating factors.     Patient Active Problem List    Diagnosis Date Noted   • Obesity (BMI 35.0-39.9 without comorbidity) 09/27/2017   • Pain in joint, pelvic region and thigh 12/18/2014   • HTN (hypertension) 02/20/2014   • Abdominal adhesions 01/27/2014   • Other specified disorder of gallbladder 12/11/2013   • Hypertriglyceridemia 03/20/2013   • Depression    • HTN (hypertension)        Allergies:Nkda [no known drug allergy]    Current Outpatient Prescriptions Ordered in [facility]   Medication Sig Dispense Refill   • Dextromethorphan-Guaifenesin (COUGH & CHEST CONGESTION DM) 5-100 MG/5ML Liquid Take  by mouth.     • benzonatate (TESSALON) 100 MG Cap Take 1 Cap by mouth 3 times a day as needed. 30 Cap 0   • guaifenesin-codeine (ROBITUSSIN AC) Solution oral solution Take 5-10 mL by mouth at bedtime as needed for Cough for up to 15 days. Do not drive, work, drink alcohol or engaged in potentially hazardous activity while taking this medication. 120 mL 0   • fluticasone (FLONASE) 50 MCG/ACT nasal spray Spray 1 Spray in nose 2 times a day. 16 g 0   • ibuprofen (MOTRIN) 200 MG Tab Take 200 mg by mouth every 6 hours as needed.     • acetaminophen (TYLENOL) 325 MG Tab Take 650 mg by mouth every four hours as needed.     • asa/apap/caffeine (EXCEDRIN)  "250-250-65 MG Tab Take 1 Tab by mouth every 6 hours as needed for Headache.       No current Epic-ordered facility-administered medications on file.        Past Medical History:   Diagnosis Date   • Depression    • HTN     resolved 8/10   • Pain 01-24-14    abd., 0/10   • Pain     right hip   • Renal disorder     right nephrectomy 2010   • S/p nephrectomy 2010    right removed       Social History   Substance Use Topics   • Smoking status: Never Smoker   • Smokeless tobacco: Never Used   • Alcohol use 0.0 oz/week      Comment: 2 per month       Family Status   Relation Status   • Mo Alive   • Fa Alive   • Bro Alive   • Sis Alive   • Sis Alive     Family History   Problem Relation Age of Onset   • Hypertension Mother    • Hypertension Father    • Hypertension Brother    • Genitourinary () Brother         kidney stones       ROS:  Review of Systems   Constitutional: Positive for subjective fever, chills, fatigue, malaise. Negative for weight loss.  HENT: Positive for congestion, ear pressure, and sore throat. Negative for ear pain, nosebleeds, and neck pain.    Eyes: Negative for vision changes.   Cardiovascular: Negative for chest pain, palpitations, orthopnea and leg swelling.   Respiratory: Positive for cough and sputum production. Negative for shortness of breath and wheezing.   Gastrointestinal: Negative for abdominal pain, nausea, vomiting or diarrhea.   Skin: Negative for rash, diaphoresis.     Exam:  Blood pressure 134/94, pulse 87, temperature 36.1 °C (97 °F), resp. rate 16, height 1.702 m (5' 7\"), weight 102.1 kg (225 lb), SpO2 97 %, not currently breastfeeding.  General: well-nourished, well-developed female in NAD  Head: normocephalic, atraumatic  Eyes: PERRLA, EOM within normal limits, no conjunctival injection, no scleral icterus, visual fields and acuity grossly intact.  Ears: normal shape and symmetry, no tenderness, no discharge. External canals are without any significant edema or erythema. " Tympanic membranes are without any inflammation, no effusion. Gross auditory acuity is intact.  Nose: symmetrical without tenderness, mild discharge, erythema present bilateral nares.  Mouth/Throat: reasonable hygiene, no exudates or tonsillar enlargement. Mild erythema present.   Neck: no masses, range of motion within normal limits, no tracheal deviation.  Lymph: mild cervical adenopathy. No supraclavicular adenopathy.   Neuro: alert and oriented. Cranial nerves 1-12 grossly intact.   Cardiovascular: regular rate and rhythm without murmurs, rubs, or gallops. No edema.   Pulmonary: no distress. Chest is symmetrical with respiration. No wheezes, crackles, or rhonchi.   Musculoskeletal: appropriate muscle tone, gait is stable.  Skin: warm, dry, intact, no clubbing, no cyanosis.   Psych: appropriate mood, affect, judgement.         Assessment/Plan:  1. Upper respiratory infection with cough and congestion  benzonatate (TESSALON) 100 MG Cap    guaifenesin-codeine (ROBITUSSIN AC) Solution oral solution    fluticasone (FLONASE) 50 MCG/ACT nasal spray           Discussed symptoms most likely viral, self limiting illness. Did not see any evidence of a bacterial process. Discussed natural progression and sx care. Flonase as directed. Tessalon and Robitussin AC for cough, sedative effects of medication discussed with patient.   Rest, increase fluids, hand and respiratory hygiene.   May take OTC medications as directed for symptom relief. Honey for cough.   Supportive care, differential diagnoses, and indications for immediate follow-up discussed with patient.   Pathogenesis of diagnosis discussed including typical length and natural progression.  Instructed to return to clinic or nearest emergency department for any change in condition, further concerns, or worsening of symptoms.  Patient states understanding of the plan of care and discharge instructions.  Instructed to make an appointment with their primary care provider  in the next 3-7 days if not significantly improving and for further care.         Please note that this dictation was created using voice recognition software. I have made every reasonable attempt to correct obvious errors, but I expect that there are errors of grammar and possibly content that I did not discover before finalizing the note.  A mask was worn for the entire visit with the patient.     ELZA Dejesus.

## 2019-06-01 ENCOUNTER — APPOINTMENT (OUTPATIENT)
Dept: RADIOLOGY | Facility: MEDICAL CENTER | Age: 44
End: 2019-06-01
Attending: EMERGENCY MEDICINE
Payer: COMMERCIAL

## 2019-06-01 ENCOUNTER — HOSPITAL ENCOUNTER (EMERGENCY)
Facility: MEDICAL CENTER | Age: 44
End: 2019-06-01
Attending: EMERGENCY MEDICINE
Payer: COMMERCIAL

## 2019-06-01 VITALS
WEIGHT: 233.25 LBS | TEMPERATURE: 98.8 F | DIASTOLIC BLOOD PRESSURE: 97 MMHG | RESPIRATION RATE: 12 BRPM | HEART RATE: 88 BPM | OXYGEN SATURATION: 95 % | BODY MASS INDEX: 36.53 KG/M2 | SYSTOLIC BLOOD PRESSURE: 156 MMHG

## 2019-06-01 DIAGNOSIS — I10 SEVERE HYPERTENSION: ICD-10-CM

## 2019-06-01 DIAGNOSIS — R10.12 LEFT UPPER QUADRANT ABDOMINAL PAIN OF UNKNOWN ETIOLOGY: ICD-10-CM

## 2019-06-01 LAB
ALBUMIN SERPL BCP-MCNC: 4.3 G/DL (ref 3.2–4.9)
ALBUMIN/GLOB SERPL: 1.5 G/DL
ALP SERPL-CCNC: 81 U/L (ref 30–99)
ALT SERPL-CCNC: 12 U/L (ref 2–50)
ANION GAP SERPL CALC-SCNC: 9 MMOL/L (ref 0–11.9)
APPEARANCE UR: CLEAR
AST SERPL-CCNC: 17 U/L (ref 12–45)
BASOPHILS # BLD AUTO: 0.7 % (ref 0–1.8)
BASOPHILS # BLD: 0.06 K/UL (ref 0–0.12)
BILIRUB SERPL-MCNC: 0.8 MG/DL (ref 0.1–1.5)
BILIRUB UR QL STRIP.AUTO: NEGATIVE
BUN SERPL-MCNC: 11 MG/DL (ref 8–22)
CALCIUM SERPL-MCNC: 9.5 MG/DL (ref 8.5–10.5)
CHLORIDE SERPL-SCNC: 109 MMOL/L (ref 96–112)
CO2 SERPL-SCNC: 25 MMOL/L (ref 20–33)
COLOR UR: YELLOW
CREAT SERPL-MCNC: 1.01 MG/DL (ref 0.5–1.4)
EOSINOPHIL # BLD AUTO: 0.1 K/UL (ref 0–0.51)
EOSINOPHIL NFR BLD: 1.1 % (ref 0–6.9)
ERYTHROCYTE [DISTWIDTH] IN BLOOD BY AUTOMATED COUNT: 40.3 FL (ref 35.9–50)
GLOBULIN SER CALC-MCNC: 2.8 G/DL (ref 1.9–3.5)
GLUCOSE SERPL-MCNC: 98 MG/DL (ref 65–99)
GLUCOSE UR STRIP.AUTO-MCNC: NEGATIVE MG/DL
HCT VFR BLD AUTO: 41.7 % (ref 37–47)
HGB BLD-MCNC: 14.2 G/DL (ref 12–16)
IMM GRANULOCYTES # BLD AUTO: 0.03 K/UL (ref 0–0.11)
IMM GRANULOCYTES NFR BLD AUTO: 0.3 % (ref 0–0.9)
KETONES UR STRIP.AUTO-MCNC: NEGATIVE MG/DL
LACTATE BLD-SCNC: 1.4 MMOL/L (ref 0.5–2)
LEUKOCYTE ESTERASE UR QL STRIP.AUTO: NEGATIVE
LIPASE SERPL-CCNC: 14 U/L (ref 11–82)
LYMPHOCYTES # BLD AUTO: 2.83 K/UL (ref 1–4.8)
LYMPHOCYTES NFR BLD: 31.9 % (ref 22–41)
MCH RBC QN AUTO: 30 PG (ref 27–33)
MCHC RBC AUTO-ENTMCNC: 34.1 G/DL (ref 33.6–35)
MCV RBC AUTO: 88 FL (ref 81.4–97.8)
MICRO URNS: NORMAL
MONOCYTES # BLD AUTO: 0.67 K/UL (ref 0–0.85)
MONOCYTES NFR BLD AUTO: 7.5 % (ref 0–13.4)
NEUTROPHILS # BLD AUTO: 5.19 K/UL (ref 2–7.15)
NEUTROPHILS NFR BLD: 58.5 % (ref 44–72)
NITRITE UR QL STRIP.AUTO: NEGATIVE
NRBC # BLD AUTO: 0 K/UL
NRBC BLD-RTO: 0 /100 WBC
PH UR STRIP.AUTO: 5.5 [PH]
PLATELET # BLD AUTO: 262 K/UL (ref 164–446)
PMV BLD AUTO: 9.4 FL (ref 9–12.9)
POTASSIUM SERPL-SCNC: 3.7 MMOL/L (ref 3.6–5.5)
PROT SERPL-MCNC: 7.1 G/DL (ref 6–8.2)
PROT UR QL STRIP: NEGATIVE MG/DL
RBC # BLD AUTO: 4.74 M/UL (ref 4.2–5.4)
RBC UR QL AUTO: NEGATIVE
SODIUM SERPL-SCNC: 143 MMOL/L (ref 135–145)
SP GR UR STRIP.AUTO: 1.02
UROBILINOGEN UR STRIP.AUTO-MCNC: 0.2 MG/DL
WBC # BLD AUTO: 8.9 K/UL (ref 4.8–10.8)

## 2019-06-01 PROCEDURE — 99285 EMERGENCY DEPT VISIT HI MDM: CPT

## 2019-06-01 PROCEDURE — 74176 CT ABD & PELVIS W/O CONTRAST: CPT

## 2019-06-01 PROCEDURE — 83690 ASSAY OF LIPASE: CPT

## 2019-06-01 PROCEDURE — 700105 HCHG RX REV CODE 258: Performed by: EMERGENCY MEDICINE

## 2019-06-01 PROCEDURE — 96374 THER/PROPH/DIAG INJ IV PUSH: CPT

## 2019-06-01 PROCEDURE — 85025 COMPLETE CBC W/AUTO DIFF WBC: CPT

## 2019-06-01 PROCEDURE — 80053 COMPREHEN METABOLIC PANEL: CPT

## 2019-06-01 PROCEDURE — 700111 HCHG RX REV CODE 636 W/ 250 OVERRIDE (IP): Performed by: EMERGENCY MEDICINE

## 2019-06-01 PROCEDURE — 83605 ASSAY OF LACTIC ACID: CPT

## 2019-06-01 PROCEDURE — 96376 TX/PRO/DX INJ SAME DRUG ADON: CPT

## 2019-06-01 PROCEDURE — 81003 URINALYSIS AUTO W/O SCOPE: CPT

## 2019-06-01 PROCEDURE — 96375 TX/PRO/DX INJ NEW DRUG ADDON: CPT

## 2019-06-01 RX ORDER — HYDROMORPHONE HYDROCHLORIDE 1 MG/ML
1 INJECTION, SOLUTION INTRAMUSCULAR; INTRAVENOUS; SUBCUTANEOUS ONCE
Status: COMPLETED | OUTPATIENT
Start: 2019-06-01 | End: 2019-06-01

## 2019-06-01 RX ORDER — OXYCODONE HYDROCHLORIDE AND ACETAMINOPHEN 5; 325 MG/1; MG/1
1 TABLET ORAL EVERY 4 HOURS PRN
Qty: 15 TAB | Refills: 0 | Status: SHIPPED | OUTPATIENT
Start: 2019-06-01 | End: 2019-06-05

## 2019-06-01 RX ORDER — ONDANSETRON 2 MG/ML
4 INJECTION INTRAMUSCULAR; INTRAVENOUS ONCE
Status: COMPLETED | OUTPATIENT
Start: 2019-06-01 | End: 2019-06-01

## 2019-06-01 RX ORDER — METOPROLOL SUCCINATE 25 MG/1
25 TABLET, EXTENDED RELEASE ORAL DAILY
Qty: 30 TAB | Refills: 0 | Status: SHIPPED | OUTPATIENT
Start: 2019-06-01 | End: 2020-10-06

## 2019-06-01 RX ORDER — SODIUM CHLORIDE 9 MG/ML
1000 INJECTION, SOLUTION INTRAVENOUS ONCE
Status: COMPLETED | OUTPATIENT
Start: 2019-06-01 | End: 2019-06-01

## 2019-06-01 RX ORDER — ENALAPRILAT 1.25 MG/ML
1.25 INJECTION INTRAVENOUS ONCE
Status: COMPLETED | OUTPATIENT
Start: 2019-06-01 | End: 2019-06-01

## 2019-06-01 RX ADMIN — ENALAPRILAT 1.25 MG: 1.25 INJECTION INTRAVENOUS at 19:43

## 2019-06-01 RX ADMIN — SODIUM CHLORIDE 1000 ML: 9 INJECTION, SOLUTION INTRAVENOUS at 18:22

## 2019-06-01 RX ADMIN — HYDROMORPHONE HYDROCHLORIDE 1 MG: 1 INJECTION, SOLUTION INTRAMUSCULAR; INTRAVENOUS; SUBCUTANEOUS at 19:29

## 2019-06-01 RX ADMIN — HYDROMORPHONE HYDROCHLORIDE 1 MG: 1 INJECTION, SOLUTION INTRAMUSCULAR; INTRAVENOUS; SUBCUTANEOUS at 18:21

## 2019-06-01 RX ADMIN — ONDANSETRON 4 MG: 2 INJECTION INTRAMUSCULAR; INTRAVENOUS at 18:21

## 2019-06-02 NOTE — ED NOTES
Pt discharged with one paper prescription and one prescription to  at pharmacy. Pt verbalized understanding of discharge education and controlled substance release form along with medication information. Pt ambulated to lobby with steady gait accompanied by family.

## 2019-06-02 NOTE — DISCHARGE INSTRUCTIONS
Clear liquids only for the next 24 hours and advance diet as tolerated.  This will give the got a chance to rest.  Use pain medication as prescribed.  Your blood pressure was elevated you are being placed on low-dose medication for blood pressure control.  Please monitor your blood pressure and see your primary doctor for reevaluation of blood pressure.

## 2019-06-02 NOTE — ED TRIAGE NOTES
Pt c/o left flank pain. Hx of right kidney removal due to kidney stones. Pt also states blood from rectum, denies having BM with blood, states only blood from rectum.

## 2019-06-02 NOTE — ED PROVIDER NOTES
ED Provider  Scribed for Danyel Wong D.O. by Shante Suazo. 6/1/2019  5:50 PM    Means of arrival:walk-in  History obtained from:patient  History limited by: none    CHIEF COMPLAINT  Chief Complaint   Patient presents with   • Flank Pain   • Rectal Pain       HPI  Leslie Nazario is a 44 y.o. female with a history of kidney stones who presents to the ED complaining of constant left flank pain onset yesterday afternoon. Today, she states that the left flank pain began to radiate towards her left abdomen. She reports having some lightheadedness since yesterday, but denies any associated fevers or chills. No alleviating or exacerbating factors were identified for her left flank pain, and states that she has not been taking any medications for her symptoms.The patient reports a history of right nephrectomy due to recurrent kidney stones and a hysterectomy. Additionally, she notes that she used to be hypertensive before her nephrectomy, but is no longer hypertensive.      In addition to her left flank pain, the patient also states that she began experiencing blood from her rectum onset earlier today. She reports urinating and upon wiping, she noticed that there was blood coming from her rectum. She denies any blood in the toilet itself, however. The patient also reports having long periods of constipation in the past.     REVIEW OF SYSTEMS  See HPI for further details. All other systems are negative.     PAST MEDICAL HISTORY   has a past medical history of Depression; HTN; Pain (01-24-14); Pain; Renal disorder; and S/p nephrectomy (2010).    SOCIAL HISTORY  Social History     Social History Main Topics   • Smoking status: Never Smoker   • Smokeless tobacco: Never Used   • Alcohol use 0.0 oz/week      Comment: 2 per month   • Drug use: No   • Sexual activity: Yes     Partners: Male       SURGICAL HISTORY   has a past surgical history that includes lysis adhesions general (5/06); IR-URETERAL STENT ANTEGRADE;  ureteral reimplantation (4/23/08); stent placement (4/23/08); ureteroscopy; cystoscopy stent placement (8/11/2009); ureteroscopy (8/11/2009); stone retrieval (8/11/2009); stent removal (8/11/2009); nephrectomy laparoscopic (2010); roe by laparoscopy (12/11/2013); abdominal hysterectomy total (2005); cholecystectomy (2013); appendectomy (1991); laparoscopy (1/27/2014); lysis adhesions general (1/27/2014); nephrectomy (2010); tonsillectomy (2004); laparotomy (2005); laparotomy (2009); other ([id number]); hip arthroscopy (12/18/2014); femoral neck osteoplasty (12/18/2014); acetabular osteoplasty (12/18/2014); and synovectomy (12/18/2014).    CURRENT MEDICATIONS       Current Outpatient Prescriptions on File Prior to Encounter   Medication Sig Dispense Refill   • Dextromethorphan-Guaifenesin (COUGH & CHEST CONGESTION DM) 5-100 MG/5ML Liquid Take  by mouth.     • benzonatate (TESSALON) 100 MG Cap Take 1 Cap by mouth 3 times a day as needed. 30 Cap 0   • fluticasone (FLONASE) 50 MCG/ACT nasal spray Spray 1 Spray in nose 2 times a day. 16 g 0   • ibuprofen (MOTRIN) 200 MG Tab Take 200 mg by mouth every 6 hours as needed.     • acetaminophen (TYLENOL) 325 MG Tab Take 650 mg by mouth every four hours as needed.     • asa/apap/caffeine (EXCEDRIN) 250-250-65 MG Tab Take 1 Tab by mouth every 6 hours as needed for Headache.         ALLERGIES  Allergies   Allergen Reactions   • Nkda [No Known Drug Allergy]        PHYSICAL EXAM  VITAL SIGNS: BP (S) (!) 187/115   Pulse 100   Temp 37.1 °C (98.8 °F) (Temporal)   Resp 16   Wt 105.8 kg (233 lb 4 oz)   SpO2 95%   BMI 36.53 kg/m²   Constitutional: Mild distress due to pain. Splinting left upper abdomen with both hands. Alert.  HENT: No signs of trauma, mucous membranes are moist  Eyes: Conjunctiva normal, Non-icteric.   Neck: Normal range of motion, No tenderness, Supple.  Lymphatic: No lymphadenopathy noted.   Cardiovascular: Regular rate and rhythm, no murmurs.   Thorax & Lungs:  Normal breath sounds, No respiratory distress, No wheezing, No chest tenderness.   Abdomen: Tenderness to left upper quadrant, no guarding, rigidity or rebound. Bowel sounds normal, Soft, No masses, No pulsatile masses. No peritoneal signs.  : Tenderness to left flank.  Skin: Warm, Dry, normal color.   Extremities: No edema, No tenderness, No cyanosis  Musculoskeletal: Good range of motion in all major joints. No tenderness to palpation or major deformities noted.   Neurologic: Alert and oriented x4, Normal motor function, Normal sensory function, No focal deficits noted.   Psychiatric: Affect normal, Judgment normal, Mood normal.     MEDICAL DECISION MAKING  This is a 44 y.o. female who presents with severe left upper quadrant pain.  She has a history of kidney stones, she has had a right nephrectomy due to scarring of the ureters.  And she has had stones on the left.  She also complains of when she wiped she saw blood.  On reevaluation she does have tenderness in left upper quadrant.  CT shows no obstructive, or inflammatory process.  Urinalysis shows no infection.  She was medicated with Dilaudid which improved the pain and on the pain is back so additional Dilaudid was given.    Her blood pressure has been elevated, give her dose of Vasotec IV to control her blood pressure will discharge home with a beta-blocker.  At this time the plan will be to discharge patient home with pain medication to give this some time to improve there is no obvious urine infection, or ureteral obstruction which is a big concern for someone with only one kidney.  But there may be early inflammatory changes it cannot be seen.  She is to return if her symptoms change worsen or do not resolve.    DIAGNOSTIC STUDIES / PROCEDURES    LABS  Results for orders placed or performed during the hospital encounter of 06/01/19   URINALYSIS CULTURE, IF INDICATED   Result Value Ref Range    Color Yellow     Character Clear     Specific Gravity 1.023  <1.035    Ph 5.5 5.0 - 8.0    Glucose Negative Negative mg/dL    Ketones Negative Negative mg/dL    Protein Negative Negative mg/dL    Bilirubin Negative Negative    Urobilinogen, Urine 0.2 Negative    Nitrite Negative Negative    Leukocyte Esterase Negative Negative    Occult Blood Negative Negative    Micro Urine Req see below    CBC WITH DIFFERENTIAL   Result Value Ref Range    WBC 8.9 4.8 - 10.8 K/uL    RBC 4.74 4.20 - 5.40 M/uL    Hemoglobin 14.2 12.0 - 16.0 g/dL    Hematocrit 41.7 37.0 - 47.0 %    MCV 88.0 81.4 - 97.8 fL    MCH 30.0 27.0 - 33.0 pg    MCHC 34.1 33.6 - 35.0 g/dL    RDW 40.3 35.9 - 50.0 fL    Platelet Count 262 164 - 446 K/uL    MPV 9.4 9.0 - 12.9 fL    Neutrophils-Polys 58.50 44.00 - 72.00 %    Lymphocytes 31.90 22.00 - 41.00 %    Monocytes 7.50 0.00 - 13.40 %    Eosinophils 1.10 0.00 - 6.90 %    Basophils 0.70 0.00 - 1.80 %    Immature Granulocytes 0.30 0.00 - 0.90 %    Nucleated RBC 0.00 /100 WBC    Neutrophils (Absolute) 5.19 2.00 - 7.15 K/uL    Lymphs (Absolute) 2.83 1.00 - 4.80 K/uL    Monos (Absolute) 0.67 0.00 - 0.85 K/uL    Eos (Absolute) 0.10 0.00 - 0.51 K/uL    Baso (Absolute) 0.06 0.00 - 0.12 K/uL    Immature Granulocytes (abs) 0.03 0.00 - 0.11 K/uL    NRBC (Absolute) 0.00 K/uL   COMP METABOLIC PANEL   Result Value Ref Range    Sodium 143 135 - 145 mmol/L    Potassium 3.7 3.6 - 5.5 mmol/L    Chloride 109 96 - 112 mmol/L    Co2 25 20 - 33 mmol/L    Anion Gap 9.0 0.0 - 11.9    Glucose 98 65 - 99 mg/dL    Bun 11 8 - 22 mg/dL    Creatinine 1.01 0.50 - 1.40 mg/dL    Calcium 9.5 8.5 - 10.5 mg/dL    AST(SGOT) 17 12 - 45 U/L    ALT(SGPT) 12 2 - 50 U/L    Alkaline Phosphatase 81 30 - 99 U/L    Total Bilirubin 0.8 0.1 - 1.5 mg/dL    Albumin 4.3 3.2 - 4.9 g/dL    Total Protein 7.1 6.0 - 8.2 g/dL    Globulin 2.8 1.9 - 3.5 g/dL    A-G Ratio 1.5 g/dL   LIPASE   Result Value Ref Range    Lipase 14 11 - 82 U/L   LACTIC ACID   Result Value Ref Range    Lactic Acid 1.4 0.5 - 2.0 mmol/L   ESTIMATED GFR    Result Value Ref Range    GFR If African American >60 >60 mL/min/1.73 m 2    GFR If Non African American 59 (A) >60 mL/min/1.73 m 2     All labs reviewed by me.    RADIOLOGY  CT-RENAL COLIC EVALUATION(A/P W/O)   Final Result         1. Tiny nonobstructive calculus in the lower pole left kidney. No left hydronephrosis.      The right kidney is surgically absent.      2. No evidence of inflammatory change in the abdomen or pelvis.  The study is however limited due to nonuse of intravenous contrast        The radiologist's interpretations of all radiological studies have been reviewed by me.        COURSE  Pertinent Labs & Imaging studies reviewed. (See chart for details)    5:50 PM - Patient seen and examined at bedside. Discussed plan of care. The patient will be resuscitated with 1L NS IV and medicated with Dilaudid 1 mg and Zofran 4 mg. Ordered for CT-Renal Colic, Lactic Acid, UA Culture, CBC Diff, CMP, Lipase, and Estimated GFR to evaluate her symptoms.     Patient's pain is improved after second Dilaudid.  Her blood pressure is improving.  She is stable for discharge home    FINAL IMPRESSION  1. Left upper quadrant abdominal pain of unknown etiology    2. Severe hypertension         Shante CONWAY (Gogoibmaikel), am scribing for, and in the presence of, Danyel Wong D.O..    Electronically signed by: Shante Suazo (Gerber), 6/1/2019    Danyel CONWAY D.O. personally performed the services described in this documentation, as scribed by Shante Suazo in my presence, and it is both accurate and complete.    C    The note accurately reflects work and decisions made by me.  Danyel Wong  6/1/2019  8:08 PM

## 2019-06-21 ENCOUNTER — OFFICE VISIT (OUTPATIENT)
Dept: MEDICAL GROUP | Facility: PHYSICIAN GROUP | Age: 44
End: 2019-06-21
Payer: COMMERCIAL

## 2019-06-21 VITALS
WEIGHT: 236 LBS | TEMPERATURE: 98.4 F | HEART RATE: 73 BPM | SYSTOLIC BLOOD PRESSURE: 140 MMHG | HEIGHT: 67 IN | BODY MASS INDEX: 37.04 KG/M2 | DIASTOLIC BLOOD PRESSURE: 80 MMHG | RESPIRATION RATE: 16 BRPM | OXYGEN SATURATION: 97 %

## 2019-06-21 DIAGNOSIS — R53.83 OTHER FATIGUE: ICD-10-CM

## 2019-06-21 DIAGNOSIS — Z80.0 FAMILY HISTORY OF COLON CANCER: ICD-10-CM

## 2019-06-21 DIAGNOSIS — K62.5 BRBPR (BRIGHT RED BLOOD PER RECTUM): ICD-10-CM

## 2019-06-21 DIAGNOSIS — K52.9 CHRONIC DIARRHEA: ICD-10-CM

## 2019-06-21 PROCEDURE — 99214 OFFICE O/P EST MOD 30 MIN: CPT | Performed by: FAMILY MEDICINE

## 2019-06-21 RX ORDER — HYDROCORTISONE ACETATE 25 MG/1
25 SUPPOSITORY RECTAL EVERY 12 HOURS
Qty: 12 SUPPOSITORY | Refills: 0 | Status: SHIPPED | OUTPATIENT
Start: 2019-06-21 | End: 2020-10-06

## 2019-06-21 NOTE — PROGRESS NOTES
Subjective:   Leslie Nazario is a 44 y.o. female here today for kidney stones and rectal bleeding. She is unaccompanied for today's visit. This is my first visit with the patient as she is an established patient of Dr. Ruiz.     The patient states that on June 1st she began to experience acute back pain. She reports that she has an extensive history of kidney stones and has even had one of her kidneys removed in the past secondary to this issue. She felt that the back pain was a new kidney stone. Patient was seen in the ED on June 1st and was told that there was no evidence of kidney stones at that time. She did experience an episode of rectal bleeding that day and was told that the bleeding could have possibly been due to an early kidney infection which could have caused blood to leak into the colon. Since being seen in the ED, patient's back pain has resolved. She now reports experiencing two additional episodes of rectal bleeding which both occurred yesterday. Patient states that he first episode was at approximately 11 AM yesterday, followed by a second episode at 3 PM. She only produced a bowel movement with the second episode of bleeding. During both episodes, the blood was bright red. It is difficult for her to quantify the amount, but states that one episode required 3 wipes before resolving. She has associated fatigue, but otherwise denies fever or chills. Patient does report that she does suffer from chronic diarrhea following meals. She also has chronic mild abdominal pain. Due to this, she does not wear tight-fitting pants. She denies any dark or tarry stools within the last month. Additionally, patient adds that she has been seen by GI in the past and underwent an extensive workup regarding her chronic diarrhea. She did not have a colonoscopy at that time. Patient is not on any blood thinners, but does take Excedrin regularly for headaches. She adds that her dad has been diagnosed with IBS and  "that her grandmother passed away from colon cancer in her 60s.     Current medicines (including changes today)  Current Outpatient Prescriptions   Medication Sig Dispense Refill   • hydrocortisone (ANUSOL-HC) 25 MG Suppos Insert 1 Suppository in rectum every 12 hours. For three days.  Okay to continue for six days if needed. 12 Suppository 0   • metoprolol SR (TOPROL XL) 25 MG TABLET SR 24 HR Take 1 Tab by mouth every day. 30 Tab 0   • asa/apap/caffeine (EXCEDRIN) 250-250-65 MG Tab Take 1 Tab by mouth every 6 hours as needed for Headache.     • Dextromethorphan-Guaifenesin (COUGH & CHEST CONGESTION DM) 5-100 MG/5ML Liquid Take  by mouth.     • ibuprofen (MOTRIN) 200 MG Tab Take 200 mg by mouth every 6 hours as needed.     • acetaminophen (TYLENOL) 325 MG Tab Take 650 mg by mouth every four hours as needed.       No current facility-administered medications for this visit.      She  has a past medical history of Depression; HTN; Pain (01-24-14); Pain; Renal disorder; and S/p nephrectomy (2010).    ROS   No chest pain, no shortness of breath, no increase in abdominal pain, no fever, no chills, no black/tarry stools.      Objective:     Physical Exam:  /80 (BP Location: Right arm, Patient Position: Sitting, BP Cuff Size: Large adult)   Pulse 73   Temp 36.9 °C (98.4 °F) (Temporal)   Resp 16   Ht 1.702 m (5' 7\")   Wt 107 kg (236 lb)   SpO2 97%  Body mass index is 36.96 kg/m².   Constitutional: Alert, no distress.  Skin: Warm, dry, good turgor, no rashes in visible areas.  Eye: Equal, round and reactive, conjunctiva clear, lids normal.  ENMT: Lips without lesions, good dentition, oropharynx clear.  Neck: Trachea midline, no masses, no thyromegaly.  Respiratory: Unlabored respiratory effort, lungs clear to auscultation, no wheezes, no rhonchi.  Cardiovascular: Normal S1, S2, no murmur, no edema.  Abdomen: Normoactive bowel sounds. Soft, no masses, no hepatosplenomegaly, chronic generalized tenderness to " palpation which is slightly worse in the LLQ today.  Rectal: no external abnormalities, no ulcerations, no abrasions, no fissures. Good tone, no palpable masses or tenderness, no blood.   Psych: Alert and oriented x3, normal affect and mood.    Assessment and Plan:     1. BRBPR (bright red blood per rectum)  2. Chronic diarrhea  3. Other fatigue  4. Family history of colon cancer  This is a new issue. Patient describes a lower GI bleed. Etiology is unclear at this time. Exam is reassuring, but there is slightly worsened left lower quadrant abdominal pain. Normal rectal exam. She has a history of chronic diarrhea and generalized abdominal pain. She reports having undergone an extensive GI work-up in the past that was unremarkable. No prior colonoscopy. Differentials include internal hemorroids (most likely) versus inflammatory bowel disease or colitis. Less likely is colon cancer, although given her family history, this needs to be considered.    I've started her on hydrocortisone suppositories as her rectal bleeding is likely due to internal hemorrhoids. Referral to GI has been placed. I will also order a CBC to evaluate for anemia. ER and return precautions (continued episodes of rectal bleeding, abdominal pain, fever) discussed.    - CBC WITH DIFFERENTIAL; Future  - REFERRAL TO GASTROENTEROLOGY  - hydrocortisone (ANUSOL-HC) 25 MG Suppos; Insert 1 Suppository in rectum every 12 hours. For three days.  Okay to continue for six days if needed.  Dispense: 12 Suppository; Refill: 0    Followup: Return if symptoms worsen or fail to improve.          Nolan CONWAY (Greber), am scribing for, and in the presence of, Larissa Taylor MD    Electronically signed by: Nolan Cooper (Gerber), 6/21/2019    Larissa CONWAY MD personally performed the services described in this documentation, as scribed by Nolan Cooper in my presence, and it is both accurate and complete.

## 2019-09-09 ENCOUNTER — OFFICE VISIT (OUTPATIENT)
Dept: URGENT CARE | Facility: CLINIC | Age: 44
End: 2019-09-09
Payer: COMMERCIAL

## 2019-09-09 ENCOUNTER — HOSPITAL ENCOUNTER (OUTPATIENT)
Dept: RADIOLOGY | Facility: MEDICAL CENTER | Age: 44
End: 2019-09-09
Attending: NURSE PRACTITIONER
Payer: COMMERCIAL

## 2019-09-09 ENCOUNTER — HOSPITAL ENCOUNTER (OUTPATIENT)
Facility: MEDICAL CENTER | Age: 44
End: 2019-09-09
Attending: NURSE PRACTITIONER
Payer: COMMERCIAL

## 2019-09-09 VITALS
WEIGHT: 243 LBS | DIASTOLIC BLOOD PRESSURE: 84 MMHG | OXYGEN SATURATION: 98 % | HEIGHT: 67 IN | BODY MASS INDEX: 38.14 KG/M2 | SYSTOLIC BLOOD PRESSURE: 122 MMHG | TEMPERATURE: 97.9 F | HEART RATE: 83 BPM | RESPIRATION RATE: 14 BRPM

## 2019-09-09 DIAGNOSIS — N20.0 KIDNEY STONE: ICD-10-CM

## 2019-09-09 DIAGNOSIS — R10.9 FLANK PAIN: ICD-10-CM

## 2019-09-09 DIAGNOSIS — N30.01 ACUTE CYSTITIS WITH HEMATURIA: ICD-10-CM

## 2019-09-09 LAB
APPEARANCE UR: NORMAL
BILIRUB UR STRIP-MCNC: NORMAL MG/DL
COLOR UR AUTO: YELLOW
GLUCOSE UR STRIP.AUTO-MCNC: NORMAL MG/DL
KETONES UR STRIP.AUTO-MCNC: NORMAL MG/DL
LEUKOCYTE ESTERASE UR QL STRIP.AUTO: NORMAL
NITRITE UR QL STRIP.AUTO: NORMAL
PH UR STRIP.AUTO: 5.5 [PH] (ref 5–8)
PROT UR QL STRIP: NORMAL MG/DL
RBC UR QL AUTO: NORMAL
SP GR UR STRIP.AUTO: 1.02
UROBILINOGEN UR STRIP-MCNC: 0.2 MG/DL

## 2019-09-09 PROCEDURE — 99214 OFFICE O/P EST MOD 30 MIN: CPT | Performed by: NURSE PRACTITIONER

## 2019-09-09 PROCEDURE — 74176 CT ABD & PELVIS W/O CONTRAST: CPT

## 2019-09-09 PROCEDURE — 81002 URINALYSIS NONAUTO W/O SCOPE: CPT | Performed by: NURSE PRACTITIONER

## 2019-09-09 PROCEDURE — 87086 URINE CULTURE/COLONY COUNT: CPT

## 2019-09-09 PROCEDURE — 99000 SPECIMEN HANDLING OFFICE-LAB: CPT | Performed by: NURSE PRACTITIONER

## 2019-09-09 RX ORDER — CEFDINIR 300 MG/1
300 CAPSULE ORAL EVERY 12 HOURS
Qty: 10 CAP | Refills: 0 | Status: SHIPPED | OUTPATIENT
Start: 2019-09-09 | End: 2019-09-14

## 2019-09-09 RX ORDER — TAMSULOSIN HYDROCHLORIDE 0.4 MG/1
0.4 CAPSULE ORAL
Qty: 30 CAP | Refills: 0 | Status: SHIPPED | OUTPATIENT
Start: 2019-09-09 | End: 2020-10-06

## 2019-09-09 RX ORDER — LACTULOSE 10 G/15ML
SOLUTION ORAL
Refills: 0 | COMMUNITY
Start: 2019-08-05 | End: 2020-10-06

## 2019-09-09 RX ORDER — POLYETHYLENE GLYCOL 3350, SODIUM SULFATE, SODIUM CHLORIDE, POTASSIUM CHLORIDE, ASCORBIC ACID, SODIUM ASCORBATE 140-9-5.2G
KIT ORAL
Refills: 0 | COMMUNITY
Start: 2019-07-24 | End: 2020-10-06

## 2019-09-09 ASSESSMENT — ENCOUNTER SYMPTOMS
FEVER: 0
SHORTNESS OF BREATH: 0
ABDOMINAL PAIN: 0
FATIGUE: 1
EYE PAIN: 0
MYALGIAS: 1
FLANK PAIN: 1
VOMITING: 0
CHILLS: 1
NAUSEA: 0
SORE THROAT: 0
DIZZINESS: 0

## 2019-09-09 NOTE — LETTER
September 9, 2019         Patient: Leslie Nazario   YOB: 1975   Date of Visit: 9/9/2019           To Whom it May Concern:    Leslie Nazario was seen in my clinic on 9/9/2019. She may return to work on 9/10/19.    If you have any questions or concerns, please don't hesitate to call.        Sincerely,           ELZA Brown.  Electronically Signed

## 2019-09-10 DIAGNOSIS — R10.9 FLANK PAIN: ICD-10-CM

## 2019-09-10 DIAGNOSIS — N30.01 ACUTE CYSTITIS WITH HEMATURIA: ICD-10-CM

## 2019-09-10 NOTE — PROGRESS NOTES
"Subjective:   Leslie Nazario is a 44 y.o. female who presents for UTI (burning x 1 day)        UTI   This is a new problem. The current episode started yesterday. The problem occurs constantly. The problem has been gradually worsening. Associated symptoms include chills, fatigue, myalgias and urinary symptoms. Pertinent negatives include no abdominal pain, chest pain, congestion, fever, nausea, rash, sore throat or vomiting. Associated symptoms comments: Left side flank pain  . Nothing aggravates the symptoms. She has tried nothing for the symptoms. The treatment provided no relief.   Patient with a history of recurrent kidney stones.  Patient has had a right-sided nephrectomy due to kidney stones.  Patient states that pain waxes and wanes of the left flank.  Review of Systems   Constitutional: Positive for chills and fatigue. Negative for fever.   HENT: Negative for congestion and sore throat.    Eyes: Negative for pain.   Respiratory: Negative for shortness of breath.    Cardiovascular: Negative for chest pain.   Gastrointestinal: Negative for abdominal pain, nausea and vomiting.   Genitourinary: Positive for dysuria, flank pain and urgency. Negative for hematuria.   Musculoskeletal: Positive for myalgias.   Skin: Negative for rash.   Neurological: Negative for dizziness.     Allergies   Allergen Reactions   • Nkda [No Known Drug Allergy]       Objective:   /84   Pulse 83   Temp 36.6 °C (97.9 °F)   Resp 14   Ht 1.702 m (5' 7\")   Wt 110.2 kg (243 lb)   SpO2 98%   BMI 38.06 kg/m²   Physical Exam   Constitutional: She is oriented to person, place, and time. She appears well-developed and well-nourished. No distress.   HENT:   Head: Normocephalic and atraumatic.   Eyes: Pupils are equal, round, and reactive to light. Conjunctivae and EOM are normal.   Cardiovascular: Normal rate and regular rhythm.   No murmur heard.  Pulmonary/Chest: Effort normal and breath sounds normal. No respiratory distress. "   Abdominal: Soft. Bowel sounds are normal. She exhibits no distension. There is no tenderness. There is CVA tenderness (left ).   Neurological: She is alert and oriented to person, place, and time. She has normal reflexes. No sensory deficit.   Skin: Skin is warm and dry.   Psychiatric: She has a normal mood and affect.         Assessment/Plan:     1. Flank pain  CT-RENAL COLIC EVALUATION(A/P W/O)    POCT Urinalysis    Urine Culture    cefdinir (OMNICEF) 300 MG Cap   2. Acute cystitis with hematuria  POCT Urinalysis    Urine Culture    cefdinir (OMNICEF) 300 MG Cap   3. Kidney stone  tamsulosin (FLOMAX) 0.4 MG capsule     Patient with cloudy appearing yellow urine, dysuria, urinary frequency with blood care patient with a history of kidney stones will obtain diagnostic CT to rule out.  At this time will start patient on Omnicef twice daily x5 days and send urine for culture.  Results for orders placed or performed in visit on 09/09/19   POCT Urinalysis   Result Value Ref Range    POC Color YELLOW Negative    POC Appearance CLOUDY Negative    POC Leukocyte Esterase NEG Negative    POC Nitrites NEG Negative    POC Urobiligen 0.2 Negative (0.2) mg/dL    POC Protein NEG Negative mg/dL    POC Urine PH 5.5 5.0 - 8.0    POC Blood TRACE-INTACT Negative    POC Specific Gravity 1.025 <1.005 - >1.030    POC Ketones NEG Negative mg/dL    POC Bilirubin NEG Negative mg/dL    POC Glucose NEG Negative mg/dL        1.  2 MM CALCULUS IS NOW IDENTIFIED IN THE LEFT DISTAL URETER AND WAS LIKELY PRESENT IN THE LOWER POLE OF THE LEFT KIDNEY ON THE PRIOR EXAM. NO SIGNIFICANT HYDRONEPHROSIS OR HYDROURETER IS CURRENTLY APPRECIATED.    2.  POST RIGHT NEPHRECTOMY CHANGES AGAIN NOTED.    Telephone encounter 2000: Called and advised patient of CT findings.  Nonobstructing stone noted of the left kidney.  Flomax has been sent to assist in passing of the stone.  Recommended Tylenol ibuprofen for pain and discomfort.  Patient given precautionary  s/sx that mandate immediate follow up and evaluation in the ED. Advised of risks of not doing so.    DDX, Supportive care, and indications for immediate follow-up discussed with patient.    Instructed to return to clinic or nearest emergency department if we are not available for any change in condition, further concerns, or worsening of symptoms.    The patient demonstrated a good understanding and agreed with the treatment plan.

## 2019-09-12 LAB
BACTERIA UR CULT: NORMAL
SIGNIFICANT IND 70042: NORMAL
SITE SITE: NORMAL
SOURCE SOURCE: NORMAL

## 2019-12-23 ENCOUNTER — HOSPITAL ENCOUNTER (OUTPATIENT)
Dept: RADIOLOGY | Facility: MEDICAL CENTER | Age: 44
End: 2019-12-23
Attending: PHYSICIAN ASSISTANT
Payer: COMMERCIAL

## 2019-12-23 ENCOUNTER — HOSPITAL ENCOUNTER (OUTPATIENT)
Facility: MEDICAL CENTER | Age: 44
End: 2019-12-23
Attending: PHYSICIAN ASSISTANT
Payer: COMMERCIAL

## 2019-12-23 ENCOUNTER — OFFICE VISIT (OUTPATIENT)
Dept: URGENT CARE | Facility: CLINIC | Age: 44
End: 2019-12-23
Payer: COMMERCIAL

## 2019-12-23 VITALS
HEART RATE: 73 BPM | RESPIRATION RATE: 16 BRPM | SYSTOLIC BLOOD PRESSURE: 114 MMHG | HEIGHT: 67 IN | OXYGEN SATURATION: 95 % | BODY MASS INDEX: 38.14 KG/M2 | WEIGHT: 243 LBS | DIASTOLIC BLOOD PRESSURE: 70 MMHG | TEMPERATURE: 98 F

## 2019-12-23 DIAGNOSIS — R10.84 GENERALIZED ABDOMINAL PAIN: ICD-10-CM

## 2019-12-23 DIAGNOSIS — R10.9 LEFT FLANK PAIN: ICD-10-CM

## 2019-12-23 DIAGNOSIS — N30.00 ACUTE CYSTITIS WITHOUT HEMATURIA: ICD-10-CM

## 2019-12-23 LAB
APPEARANCE UR: CLEAR
BILIRUB UR STRIP-MCNC: NORMAL MG/DL
COLOR UR AUTO: YELLOW
GLUCOSE UR STRIP.AUTO-MCNC: NORMAL MG/DL
KETONES UR STRIP.AUTO-MCNC: NORMAL MG/DL
LEUKOCYTE ESTERASE UR QL STRIP.AUTO: NORMAL
NITRITE UR QL STRIP.AUTO: NORMAL
PH UR STRIP.AUTO: 6 [PH] (ref 5–8)
PROT UR QL STRIP: NORMAL MG/DL
RBC UR QL AUTO: NORMAL
SP GR UR STRIP.AUTO: 1.02
UROBILINOGEN UR STRIP-MCNC: 0.2 MG/DL

## 2019-12-23 PROCEDURE — 74176 CT ABD & PELVIS W/O CONTRAST: CPT

## 2019-12-23 PROCEDURE — 87186 SC STD MICRODIL/AGAR DIL: CPT

## 2019-12-23 PROCEDURE — 87077 CULTURE AEROBIC IDENTIFY: CPT

## 2019-12-23 PROCEDURE — 81002 URINALYSIS NONAUTO W/O SCOPE: CPT | Performed by: PHYSICIAN ASSISTANT

## 2019-12-23 PROCEDURE — 99214 OFFICE O/P EST MOD 30 MIN: CPT | Performed by: PHYSICIAN ASSISTANT

## 2019-12-23 PROCEDURE — 87086 URINE CULTURE/COLONY COUNT: CPT

## 2019-12-23 RX ORDER — ESOMEPRAZOLE MAGNESIUM 10 MG/1
GRANULE, FOR SUSPENSION, EXTENDED RELEASE ORAL
COMMUNITY
End: 2020-10-06

## 2019-12-24 NOTE — PROGRESS NOTES
Subjective:      Leslie Nazario is a 44 y.o. female who presents with Painful Urination      Dysuria    This is a new problem. The current episode started in the past 7 days. The problem occurs every urination. The problem has been unchanged. The quality of the pain is described as burning. The pain is mild. There has been no fever. Associated symptoms include flank pain (2 days ago), frequency and urgency. Pertinent negatives include no chills, hematuria, nausea or vomiting. She has tried nothing for the symptoms. Her past medical history is significant for a single kidney.       Review of Systems   Constitutional: Negative for chills, fever and malaise/fatigue.   Eyes: Negative for blurred vision.   Respiratory: Negative for shortness of breath.    Cardiovascular: Negative for chest pain and palpitations.   Gastrointestinal: Positive for abdominal pain. Negative for nausea and vomiting.   Genitourinary: Positive for dysuria, flank pain (2 days ago), frequency and urgency. Negative for hematuria.   Neurological: Negative for dizziness.       PMH:  has a past medical history of Depression, HTN, Pain (01-24-14), Pain, Renal disorder, and S/p nephrectomy (2010).  MEDS:   Current Outpatient Medications:   •  Esomeprazole Magnesium (NEXIUM) 10 MG Pack, Take  by mouth., Disp: , Rfl:   •  lactulose 10 GM/15ML Solution, USE 15ML BY MOUTH 1 HR PRIOR TO TESTING, Disp: , Rfl: 0  •  PLENVU 140 g Recon Soln, MIX AND DRINK 140-9-5.2G BY MOUTH IN AN 8OZ LIQUID ONCE. *PT WAS GIVEN INSTRUCTIONS AT OFFICE, Disp: , Rfl: 0  •  raNITIdine HCl (ZANTAC PO), Take  by mouth., Disp: , Rfl:   •  tamsulosin (FLOMAX) 0.4 MG capsule, Take 1 Cap by mouth ONE-HALF HOUR AFTER BREAKFAST. (Patient not taking: Reported on 12/23/2019), Disp: 30 Cap, Rfl: 0  •  hydrocortisone (ANUSOL-HC) 25 MG Suppos, Insert 1 Suppository in rectum every 12 hours. For three days.  Okay to continue for six days if needed. (Patient not taking: Reported on  12/23/2019), Disp: 12 Suppository, Rfl: 0  •  metoprolol SR (TOPROL XL) 25 MG TABLET SR 24 HR, Take 1 Tab by mouth every day. (Patient not taking: Reported on 12/23/2019), Disp: 30 Tab, Rfl: 0  •  Dextromethorphan-Guaifenesin (COUGH & CHEST CONGESTION DM) 5-100 MG/5ML Liquid, Take  by mouth., Disp: , Rfl:   •  ibuprofen (MOTRIN) 200 MG Tab, Take 200 mg by mouth every 6 hours as needed., Disp: , Rfl:   •  acetaminophen (TYLENOL) 325 MG Tab, Take 650 mg by mouth every four hours as needed., Disp: , Rfl:   •  asa/apap/caffeine (EXCEDRIN) 250-250-65 MG Tab, Take 1 Tab by mouth every 6 hours as needed for Headache., Disp: , Rfl:   ALLERGIES:   Allergies   Allergen Reactions   • Nkda [No Known Drug Allergy]      SURGHX:   Past Surgical History:   Procedure Laterality Date   • HIP ARTHROSCOPY  12/18/2014    Performed by Benji Lott M.D. at [facility]   • FEMORAL NECK OSTEOPLASTY  12/18/2014    Performed by Benji Lott M.D. at [facility]   • ACETABULAR OSTEOPLASTY  12/18/2014    Performed by Benji Lott M.D. at [facility]   • SYNOVECTOMY  12/18/2014    Performed by Benji Lott M.D. at [facility]   • LAPAROSCOPY  1/27/2014    Performed by Ihsan Dior M.D. at [facility]   • LYSIS ADHESIONS GENERAL  1/27/2014    Performed by Ihsan Dior M.D. at [facility]   • CASSANDRA BY LAPAROSCOPY  12/11/2013    Performed by Ihsan Dior M.D. at [facility]   • CHOLECYSTECTOMY  2013   • NEPHRECTOMY LAPAROSCOPIC  2010    Right- Performed by NINA CORREA at [facility]   • PB NEPHRECTOMY  2010   • CYSTOSCOPY STENT PLACEMENT  8/11/2009    Performed by KIMBERLY PALACIOS at [facility]   • URETEROSCOPY  8/11/2009    Performed by KIMBERLY PALACIOS at [facility]   • STONE RETRIEVAL  8/11/2009    [facility]   •  "STENT REMOVAL  8/11/2009    Performed by KIMBERLY PALACIOS at [facility]   • LAPAROTOMY  2009    bladder and ureter reconstruction   • URETERAL REIMPLANTATION  4/23/08    Performed by KIMBERLY PALACIOS at [facility]   • STENT PLACEMENT  4/23/08    Performed by KIMBERLY PALACIOS at [facility]   • LYSIS ADHESIONS GENERAL  5/06   • ABDOMINAL HYSTERECTOMY TOTAL  2005    Hysterectomy, Total Abdominal   • LAPAROTOMY  2005   • TONSILLECTOMY  2004   • APPENDECTOMY  1991   • IR-URETERAL STENT ANTEGRADE      R side   • OTHER  [id number]    \"multiple procedures prior to nephrectom , stent placement/ nephrostomy tube    • URETEROSCOPY      partial removal of ureter with reimplantation     SOCHX:  reports that she has never smoked. She has never used smokeless tobacco. She reports current alcohol use. She reports that she does not use drugs.  FH: Family history was reviewed, no pertinent findings to report     Objective:     /70   Pulse 73   Temp 36.7 °C (98 °F) (Temporal)   Resp 16   Ht 1.702 m (5' 7\")   Wt 110.2 kg (243 lb)   SpO2 95%   BMI 38.06 kg/m²      Physical Exam  Constitutional:       Appearance: Normal appearance. She is well-developed.   HENT:      Head: Normocephalic and atraumatic.      Right Ear: External ear normal.      Left Ear: External ear normal.   Eyes:      Conjunctiva/sclera: Conjunctivae normal.      Pupils: Pupils are equal, round, and reactive to light.   Cardiovascular:      Rate and Rhythm: Normal rate and regular rhythm.      Heart sounds: No murmur.   Pulmonary:      Effort: Pulmonary effort is normal.      Breath sounds: Normal breath sounds.   Abdominal:      Palpations: Abdomen is soft.      Tenderness: There is tenderness in the suprapubic area. There is no left CVA tenderness.   Skin:     General: Skin is warm and dry.      Capillary Refill: Capillary refill takes less than 2 seconds.   Neurological:      Mental Status: She is alert and " oriented to person, place, and time.   Psychiatric:         Behavior: Behavior normal.         Judgment: Judgment normal.           CT-RENAL COLIC EVALUATION(A/P W/O)  IMPRESSION:   1. No urinary tract calculus identified. The right kidney is surgically absent. Unremarkable left kidney. No left hydronephrosis.     2. No evidence of inflammatory change in the abdomen or pelvis.  The study is however limited due to nonuse of intravenous contrast     3. Small fat-containing periumbilical hernia.       Lab Results   Component Value Date/Time    POCCOLOR YELLOW 12/23/2019 01:30 PM    POCAPPEAR CLEAR 12/23/2019 01:30 PM    POCLEUKEST NEG 12/23/2019 01:30 PM    POCNITRITE NEG 12/23/2019 01:30 PM    POCUROBILIGE 0.2 12/23/2019 01:30 PM    POCPROTEIN NEG 12/23/2019 01:30 PM    POCURPH 6.0 12/23/2019 01:30 PM    POCBLOOD TRACE 12/23/2019 01:30 PM    POCSPGRV 1.025 12/23/2019 01:30 PM    POCKETONES NEG 12/23/2019 01:30 PM    POCBILIRUBIN NEG 12/23/2019 01:30 PM    POCGLUCUA NEG 12/23/2019 01:30 PM        Assessment/Plan:     1. Left flank pain  - CT-RENAL COLIC EVALUATION(A/P W/O); Future  - POCT Urinalysis  - URINE CULTURE(NEW); Future    2. Generalized abdominal pain  - CT-RENAL COLIC EVALUATION(A/P W/O); Future  - POCT Urinalysis  - URINE CULTURE(NEW); Future    3. Acute cystitis without hematuria  - sulfamethoxazole-trimethoprim (BACTRIM DS) 800-160 MG tablet; Take 1 Tab by mouth every 12 hours for 7 days.  Dispense: 14 Tab; Refill: 0  *Urine culture ended up showing growth for lactose fermenting gram-negative nubia.  We will start the patient on a course of Bactrim while awaiting official sensitivity results.          Differential Diagnosis, natural history, and supportive care discussed. Return to the Urgent Care or follow up with your PCP if symptoms fail to resolve, or for any new or worsening symptoms. Emergency room precautions discussed. Patient and/or family appears understanding of information.

## 2019-12-25 RX ORDER — SULFAMETHOXAZOLE AND TRIMETHOPRIM 800; 160 MG/1; MG/1
1 TABLET ORAL EVERY 12 HOURS
Qty: 14 TAB | Refills: 0 | Status: SHIPPED | OUTPATIENT
Start: 2019-12-25 | End: 2020-01-01

## 2019-12-25 ASSESSMENT — ENCOUNTER SYMPTOMS
FEVER: 0
ABDOMINAL PAIN: 1
NAUSEA: 0
BLURRED VISION: 0
FLANK PAIN: 1
CHILLS: 0
PALPITATIONS: 0
SHORTNESS OF BREATH: 0
VOMITING: 0
DIZZINESS: 0

## 2019-12-26 LAB
BACTERIA UR CULT: ABNORMAL
BACTERIA UR CULT: ABNORMAL
SIGNIFICANT IND 70042: ABNORMAL
SITE SITE: ABNORMAL
SOURCE SOURCE: ABNORMAL

## 2020-08-07 ENCOUNTER — HOSPITAL ENCOUNTER (OUTPATIENT)
Facility: MEDICAL CENTER | Age: 45
End: 2020-08-07
Attending: NURSE PRACTITIONER
Payer: COMMERCIAL

## 2020-08-07 ENCOUNTER — OFFICE VISIT (OUTPATIENT)
Dept: URGENT CARE | Facility: CLINIC | Age: 45
End: 2020-08-07
Payer: COMMERCIAL

## 2020-08-07 VITALS
RESPIRATION RATE: 14 BRPM | OXYGEN SATURATION: 98 % | HEIGHT: 65 IN | BODY MASS INDEX: 46.15 KG/M2 | TEMPERATURE: 97.2 F | WEIGHT: 277 LBS | HEART RATE: 96 BPM | SYSTOLIC BLOOD PRESSURE: 128 MMHG | DIASTOLIC BLOOD PRESSURE: 72 MMHG

## 2020-08-07 DIAGNOSIS — N30.01 ACUTE CYSTITIS WITH HEMATURIA: ICD-10-CM

## 2020-08-07 LAB
APPEARANCE UR: CLEAR
BILIRUB UR STRIP-MCNC: NORMAL MG/DL
COLOR UR AUTO: NORMAL
GLUCOSE UR STRIP.AUTO-MCNC: NEGATIVE MG/DL
KETONES UR STRIP.AUTO-MCNC: NEGATIVE MG/DL
LEUKOCYTE ESTERASE UR QL STRIP.AUTO: NEGATIVE
NITRITE UR QL STRIP.AUTO: POSITIVE
PH UR STRIP.AUTO: 6 [PH] (ref 5–8)
PROT UR QL STRIP: 30 MG/DL
RBC UR QL AUTO: NORMAL
SP GR UR STRIP.AUTO: 1.02
UROBILINOGEN UR STRIP-MCNC: 0.2 MG/DL

## 2020-08-07 PROCEDURE — 81002 URINALYSIS NONAUTO W/O SCOPE: CPT | Performed by: NURSE PRACTITIONER

## 2020-08-07 PROCEDURE — 99214 OFFICE O/P EST MOD 30 MIN: CPT | Performed by: NURSE PRACTITIONER

## 2020-08-07 PROCEDURE — 87077 CULTURE AEROBIC IDENTIFY: CPT

## 2020-08-07 PROCEDURE — 87086 URINE CULTURE/COLONY COUNT: CPT

## 2020-08-07 PROCEDURE — 87186 SC STD MICRODIL/AGAR DIL: CPT

## 2020-08-07 RX ORDER — PHENAZOPYRIDINE HYDROCHLORIDE 200 MG/1
200 TABLET, FILM COATED ORAL 3 TIMES DAILY
Qty: 6 TAB | Refills: 0 | Status: SHIPPED | OUTPATIENT
Start: 2020-08-07 | End: 2020-08-09

## 2020-08-07 RX ORDER — NITROFURANTOIN 25; 75 MG/1; MG/1
100 CAPSULE ORAL EVERY 12 HOURS
Qty: 10 CAP | Refills: 0 | Status: SHIPPED | OUTPATIENT
Start: 2020-08-07 | End: 2020-08-12

## 2020-08-07 ASSESSMENT — ENCOUNTER SYMPTOMS
FATIGUE: 0
CHILLS: 0
DIZZINESS: 0
FLANK PAIN: 0
FEVER: 0
NAUSEA: 0
SHORTNESS OF BREATH: 0
SORE THROAT: 0
ABDOMINAL PAIN: 1
MYALGIAS: 0
EYE REDNESS: 0
VOMITING: 0

## 2020-08-07 ASSESSMENT — FIBROSIS 4 INDEX: FIB4 SCORE: 0.84

## 2020-08-08 DIAGNOSIS — N30.01 ACUTE CYSTITIS WITH HEMATURIA: ICD-10-CM

## 2020-08-08 LAB
AMBIGUOUS DTTM AMBI4: NORMAL
SIGNIFICANT IND 70042: NORMAL
SITE SITE: NORMAL
SOURCE SOURCE: NORMAL

## 2020-08-08 NOTE — PROGRESS NOTES
"Subjective:   Leslie Nazario  is a 45 y.o. female who presents for UTI (x3 days)        UTI  This is a new problem. Episode onset: 3 days. The problem occurs constantly. The problem has been gradually worsening. Associated symptoms include abdominal pain and urinary symptoms. Pertinent negatives include no chest pain, chills, fatigue, fever, myalgias, nausea, rash, sore throat or vomiting. Nothing aggravates the symptoms. She has tried nothing for the symptoms. The treatment provided no relief.     Review of Systems   Constitutional: Negative for chills, fatigue and fever.   HENT: Negative for sore throat.    Eyes: Negative for redness.   Respiratory: Negative for shortness of breath.    Cardiovascular: Negative for chest pain.   Gastrointestinal: Positive for abdominal pain. Negative for nausea and vomiting.   Genitourinary: Positive for dysuria, frequency and urgency. Negative for flank pain and hematuria.   Musculoskeletal: Negative for myalgias.   Skin: Negative for rash.   Neurological: Negative for dizziness.     Allergies   Allergen Reactions   • Nkda [No Known Drug Allergy]       Objective:   /72   Pulse 96   Temp 36.2 °C (97.2 °F) (Temporal)   Resp 14   Ht 1.651 m (5' 5\")   Wt (!) 125.6 kg (277 lb)   SpO2 98%   BMI 46.10 kg/m²   Physical Exam  Vitals signs and nursing note reviewed.   Constitutional:       General: She is not in acute distress.     Appearance: She is well-developed.   HENT:      Head: Normocephalic and atraumatic.      Right Ear: External ear normal.      Left Ear: External ear normal.      Nose: Nose normal.      Mouth/Throat:      Mouth: Mucous membranes are moist.   Eyes:      Conjunctiva/sclera: Conjunctivae normal.   Cardiovascular:      Rate and Rhythm: Normal rate.   Pulmonary:      Effort: Pulmonary effort is normal. No respiratory distress.      Breath sounds: Normal breath sounds.   Abdominal:      General: There is no distension.      Tenderness: There is " abdominal tenderness in the suprapubic area.   Musculoskeletal: Normal range of motion.   Skin:     General: Skin is warm and dry.   Neurological:      General: No focal deficit present.      Mental Status: She is alert and oriented to person, place, and time. Mental status is at baseline.      Gait: Gait (gait at baseline) normal.   Psychiatric:         Judgment: Judgment normal.           Assessment/Plan:   1. Acute cystitis with hematuria  - POCT Urinalysis  - Urine Culture; Future  - nitrofurantoin (MACROBID) 100 MG Cap; Take 1 Cap by mouth every 12 hours for 5 days.  Dispense: 10 Cap; Refill: 0  - phenazopyridine (PYRIDIUM) 200 MG Tab; Take 1 Tab by mouth 3 times a day for 2 days.  Dispense: 6 Tab; Refill: 0    Pt. Was given ABX therapy today and will change therapy if culture indicates this is necessary. ER precautions given- worsening symptoms, back pain, abd. Pain, or fevers.   Pt. Is to increase fluids, and take the complete duration of the therapy.   Differential diagnosis, natural history, supportive care, and indications for immediate follow-up discussed.

## 2020-10-05 ENCOUNTER — APPOINTMENT (OUTPATIENT)
Dept: RADIOLOGY | Facility: MEDICAL CENTER | Age: 45
DRG: 690 | End: 2020-10-05
Attending: EMERGENCY MEDICINE
Payer: COMMERCIAL

## 2020-10-05 ENCOUNTER — OFFICE VISIT (OUTPATIENT)
Dept: URGENT CARE | Facility: CLINIC | Age: 45
End: 2020-10-05
Payer: COMMERCIAL

## 2020-10-05 ENCOUNTER — HOSPITAL ENCOUNTER (INPATIENT)
Facility: MEDICAL CENTER | Age: 45
LOS: 1 days | DRG: 690 | End: 2020-10-07
Attending: EMERGENCY MEDICINE | Admitting: STUDENT IN AN ORGANIZED HEALTH CARE EDUCATION/TRAINING PROGRAM
Payer: COMMERCIAL

## 2020-10-05 VITALS
RESPIRATION RATE: 16 BRPM | BODY MASS INDEX: 38.77 KG/M2 | HEART RATE: 105 BPM | TEMPERATURE: 98.2 F | HEIGHT: 67 IN | WEIGHT: 247 LBS | DIASTOLIC BLOOD PRESSURE: 90 MMHG | OXYGEN SATURATION: 97 % | SYSTOLIC BLOOD PRESSURE: 154 MMHG

## 2020-10-05 DIAGNOSIS — I10 ESSENTIAL HYPERTENSION: ICD-10-CM

## 2020-10-05 DIAGNOSIS — R30.0 DYSURIA: ICD-10-CM

## 2020-10-05 DIAGNOSIS — N39.0 COMPLICATED URINARY TRACT INFECTION: ICD-10-CM

## 2020-10-05 DIAGNOSIS — R10.9 ACUTE LEFT FLANK PAIN: ICD-10-CM

## 2020-10-05 DIAGNOSIS — N10 ACUTE PYELONEPHRITIS: ICD-10-CM

## 2020-10-05 DIAGNOSIS — N20.0 KIDNEY STONE: ICD-10-CM

## 2020-10-05 LAB
ALBUMIN SERPL BCP-MCNC: 4.6 G/DL (ref 3.2–4.9)
ALBUMIN/GLOB SERPL: 1.4 G/DL
ALP SERPL-CCNC: 97 U/L (ref 30–99)
ALT SERPL-CCNC: 21 U/L (ref 2–50)
ANION GAP SERPL CALC-SCNC: 15 MMOL/L (ref 7–16)
ANISOCYTOSIS BLD QL SMEAR: ABNORMAL
APPEARANCE UR: ABNORMAL
APPEARANCE UR: NORMAL
AST SERPL-CCNC: 24 U/L (ref 12–45)
BACTERIA #/AREA URNS HPF: ABNORMAL /HPF
BASOPHILS # BLD AUTO: 0 % (ref 0–1.8)
BASOPHILS # BLD: 0 K/UL (ref 0–0.12)
BILIRUB SERPL-MCNC: 0.6 MG/DL (ref 0.1–1.5)
BILIRUB UR QL STRIP.AUTO: NEGATIVE
BILIRUB UR STRIP-MCNC: NORMAL MG/DL
BUN SERPL-MCNC: 9 MG/DL (ref 8–22)
CALCIUM SERPL-MCNC: 9.9 MG/DL (ref 8.5–10.5)
CHLORIDE SERPL-SCNC: 102 MMOL/L (ref 96–112)
CO2 SERPL-SCNC: 20 MMOL/L (ref 20–33)
COLOR UR AUTO: NORMAL
COLOR UR: YELLOW
CREAT SERPL-MCNC: 0.87 MG/DL (ref 0.5–1.4)
D DIMER PPP IA.FEU-MCNC: 5.29 UG/ML (FEU) (ref 0–0.5)
EOSINOPHIL # BLD AUTO: 0.1 K/UL (ref 0–0.51)
EOSINOPHIL NFR BLD: 0.9 % (ref 0–6.9)
EPI CELLS #/AREA URNS HPF: NEGATIVE /HPF
ERYTHROCYTE [DISTWIDTH] IN BLOOD BY AUTOMATED COUNT: 41.8 FL (ref 35.9–50)
GLOBULIN SER CALC-MCNC: 3.4 G/DL (ref 1.9–3.5)
GLUCOSE SERPL-MCNC: 98 MG/DL (ref 65–99)
GLUCOSE UR STRIP.AUTO-MCNC: NEGATIVE MG/DL
GLUCOSE UR STRIP.AUTO-MCNC: NORMAL MG/DL
HCG SERPL QL: NEGATIVE
HCT VFR BLD AUTO: 49 % (ref 37–47)
HGB BLD-MCNC: 16.5 G/DL (ref 12–16)
HYALINE CASTS #/AREA URNS LPF: ABNORMAL /LPF
KETONES UR STRIP.AUTO-MCNC: NEGATIVE MG/DL
KETONES UR STRIP.AUTO-MCNC: NORMAL MG/DL
LACTATE BLD-SCNC: 1.4 MMOL/L (ref 0.5–2)
LEUKOCYTE ESTERASE UR QL STRIP.AUTO: ABNORMAL
LEUKOCYTE ESTERASE UR QL STRIP.AUTO: NORMAL
LG PLATELETS BLD QL SMEAR: NORMAL
LIPASE SERPL-CCNC: 30 U/L (ref 11–82)
LYMPHOCYTES # BLD AUTO: 3.82 K/UL (ref 1–4.8)
LYMPHOCYTES NFR BLD: 35.7 % (ref 22–41)
MANUAL DIFF BLD: NORMAL
MCH RBC QN AUTO: 29.8 PG (ref 27–33)
MCHC RBC AUTO-ENTMCNC: 33.7 G/DL (ref 33.6–35)
MCV RBC AUTO: 88.4 FL (ref 81.4–97.8)
METAMYELOCYTES NFR BLD MANUAL: 0.9 %
MICRO URNS: ABNORMAL
MICROCYTES BLD QL SMEAR: ABNORMAL
MONOCYTES # BLD AUTO: 0.65 K/UL (ref 0–0.85)
MONOCYTES NFR BLD AUTO: 6.1 % (ref 0–13.4)
MORPHOLOGY BLD-IMP: NORMAL
NEUTROPHILS # BLD AUTO: 6.05 K/UL (ref 2–7.15)
NEUTROPHILS NFR BLD: 56.5 % (ref 44–72)
NITRITE UR QL STRIP.AUTO: NORMAL
NITRITE UR QL STRIP.AUTO: POSITIVE
NRBC # BLD AUTO: 0 K/UL
NRBC BLD-RTO: 0 /100 WBC
OVALOCYTES BLD QL SMEAR: NORMAL
PH UR STRIP.AUTO: 6 [PH] (ref 5–8)
PH UR STRIP.AUTO: 6 [PH] (ref 5–8)
PLATELET # BLD AUTO: 214 K/UL (ref 164–446)
PLATELET BLD QL SMEAR: NORMAL
PMV BLD AUTO: 11.2 FL (ref 9–12.9)
POIKILOCYTOSIS BLD QL SMEAR: NORMAL
POTASSIUM SERPL-SCNC: 3.8 MMOL/L (ref 3.6–5.5)
PROT SERPL-MCNC: 8 G/DL (ref 6–8.2)
PROT UR QL STRIP: 100 MG/DL
PROT UR QL STRIP: 100 MG/DL
RBC # BLD AUTO: 5.54 M/UL (ref 4.2–5.4)
RBC # URNS HPF: ABNORMAL /HPF
RBC BLD AUTO: PRESENT
RBC UR QL AUTO: ABNORMAL
RBC UR QL AUTO: NORMAL
SODIUM SERPL-SCNC: 137 MMOL/L (ref 135–145)
SP GR UR STRIP.AUTO: 1.01
SP GR UR STRIP.AUTO: 1.02
TROPONIN T SERPL-MCNC: <6 NG/L (ref 6–19)
UROBILINOGEN UR STRIP-MCNC: 0.2 MG/DL
UROBILINOGEN UR STRIP.AUTO-MCNC: 0.2 MG/DL
WBC # BLD AUTO: 10.7 K/UL (ref 4.8–10.8)
WBC #/AREA URNS HPF: ABNORMAL /HPF

## 2020-10-05 PROCEDURE — C9803 HOPD COVID-19 SPEC COLLECT: HCPCS | Performed by: EMERGENCY MEDICINE

## 2020-10-05 PROCEDURE — 700117 HCHG RX CONTRAST REV CODE 255: Performed by: EMERGENCY MEDICINE

## 2020-10-05 PROCEDURE — 96375 TX/PRO/DX INJ NEW DRUG ADDON: CPT

## 2020-10-05 PROCEDURE — 85007 BL SMEAR W/DIFF WBC COUNT: CPT

## 2020-10-05 PROCEDURE — 85027 COMPLETE CBC AUTOMATED: CPT

## 2020-10-05 PROCEDURE — 700105 HCHG RX REV CODE 258: Performed by: EMERGENCY MEDICINE

## 2020-10-05 PROCEDURE — 83690 ASSAY OF LIPASE: CPT

## 2020-10-05 PROCEDURE — 84484 ASSAY OF TROPONIN QUANT: CPT

## 2020-10-05 PROCEDURE — 83605 ASSAY OF LACTIC ACID: CPT

## 2020-10-05 PROCEDURE — 80053 COMPREHEN METABOLIC PANEL: CPT

## 2020-10-05 PROCEDURE — 87186 SC STD MICRODIL/AGAR DIL: CPT

## 2020-10-05 PROCEDURE — 96365 THER/PROPH/DIAG IV INF INIT: CPT

## 2020-10-05 PROCEDURE — 99285 EMERGENCY DEPT VISIT HI MDM: CPT

## 2020-10-05 PROCEDURE — 84703 CHORIONIC GONADOTROPIN ASSAY: CPT

## 2020-10-05 PROCEDURE — 700111 HCHG RX REV CODE 636 W/ 250 OVERRIDE (IP): Performed by: EMERGENCY MEDICINE

## 2020-10-05 PROCEDURE — 81001 URINALYSIS AUTO W/SCOPE: CPT

## 2020-10-05 PROCEDURE — 83735 ASSAY OF MAGNESIUM: CPT

## 2020-10-05 PROCEDURE — 87040 BLOOD CULTURE FOR BACTERIA: CPT

## 2020-10-05 PROCEDURE — 36415 COLL VENOUS BLD VENIPUNCTURE: CPT

## 2020-10-05 PROCEDURE — 74176 CT ABD & PELVIS W/O CONTRAST: CPT

## 2020-10-05 PROCEDURE — 87086 URINE CULTURE/COLONY COUNT: CPT

## 2020-10-05 PROCEDURE — 81002 URINALYSIS NONAUTO W/O SCOPE: CPT | Performed by: NURSE PRACTITIONER

## 2020-10-05 PROCEDURE — 87077 CULTURE AEROBIC IDENTIFY: CPT

## 2020-10-05 PROCEDURE — 82550 ASSAY OF CK (CPK): CPT

## 2020-10-05 PROCEDURE — 99214 OFFICE O/P EST MOD 30 MIN: CPT | Performed by: NURSE PRACTITIONER

## 2020-10-05 PROCEDURE — 85379 FIBRIN DEGRADATION QUANT: CPT

## 2020-10-05 RX ORDER — HYDROMORPHONE HYDROCHLORIDE 1 MG/ML
1 INJECTION, SOLUTION INTRAMUSCULAR; INTRAVENOUS; SUBCUTANEOUS ONCE
Status: COMPLETED | OUTPATIENT
Start: 2020-10-05 | End: 2020-10-05

## 2020-10-05 RX ORDER — ONDANSETRON 2 MG/ML
4 INJECTION INTRAMUSCULAR; INTRAVENOUS ONCE
Status: COMPLETED | OUTPATIENT
Start: 2020-10-05 | End: 2020-10-05

## 2020-10-05 RX ORDER — SODIUM CHLORIDE, SODIUM LACTATE, POTASSIUM CHLORIDE, CALCIUM CHLORIDE 600; 310; 30; 20 MG/100ML; MG/100ML; MG/100ML; MG/100ML
1000 INJECTION, SOLUTION INTRAVENOUS ONCE
Status: COMPLETED | OUTPATIENT
Start: 2020-10-05 | End: 2020-10-06

## 2020-10-05 RX ADMIN — HYDROMORPHONE HYDROCHLORIDE 1 MG: 1 INJECTION, SOLUTION INTRAMUSCULAR; INTRAVENOUS; SUBCUTANEOUS at 22:29

## 2020-10-05 RX ADMIN — CEFTRIAXONE SODIUM 2 G: 2 INJECTION, POWDER, FOR SOLUTION INTRAMUSCULAR; INTRAVENOUS at 22:40

## 2020-10-05 RX ADMIN — IOHEXOL 45 ML: 350 INJECTION, SOLUTION INTRAVENOUS at 23:58

## 2020-10-05 RX ADMIN — SODIUM CHLORIDE, POTASSIUM CHLORIDE, SODIUM LACTATE AND CALCIUM CHLORIDE 1000 ML: 600; 310; 30; 20 INJECTION, SOLUTION INTRAVENOUS at 23:18

## 2020-10-05 RX ADMIN — ONDANSETRON 4 MG: 2 INJECTION INTRAMUSCULAR; INTRAVENOUS at 22:29

## 2020-10-05 ASSESSMENT — FIBROSIS 4 INDEX
FIB4 SCORE: 0.84
FIB4 SCORE: 0.84

## 2020-10-05 ASSESSMENT — ENCOUNTER SYMPTOMS
FEVER: 0
CHILLS: 0
FLANK PAIN: 0

## 2020-10-06 PROBLEM — N20.0 KIDNEY STONE: Status: ACTIVE | Noted: 2020-10-06

## 2020-10-06 PROBLEM — N39.0 COMPLICATED URINARY TRACT INFECTION: Status: ACTIVE | Noted: 2020-10-06

## 2020-10-06 LAB
CK SERPL-CCNC: 90 U/L (ref 0–154)
COVID ORDER STATUS COVID19: NORMAL
MAGNESIUM SERPL-MCNC: 2 MG/DL (ref 1.5–2.5)
SARS-COV-2 RNA RESP QL NAA+PROBE: NOTDETECTED
SPECIMEN SOURCE: NORMAL

## 2020-10-06 PROCEDURE — 700102 HCHG RX REV CODE 250 W/ 637 OVERRIDE(OP): Performed by: INTERNAL MEDICINE

## 2020-10-06 PROCEDURE — 96376 TX/PRO/DX INJ SAME DRUG ADON: CPT

## 2020-10-06 PROCEDURE — 770006 HCHG ROOM/CARE - MED/SURG/GYN SEMI*

## 2020-10-06 PROCEDURE — U0003 INFECTIOUS AGENT DETECTION BY NUCLEIC ACID (DNA OR RNA); SEVERE ACUTE RESPIRATORY SYNDROME CORONAVIRUS 2 (SARS-COV-2) (CORONAVIRUS DISEASE [COVID-19]), AMPLIFIED PROBE TECHNIQUE, MAKING USE OF HIGH THROUGHPUT TECHNOLOGIES AS DESCRIBED BY CMS-2020-01-R: HCPCS

## 2020-10-06 PROCEDURE — 99223 1ST HOSP IP/OBS HIGH 75: CPT | Performed by: STUDENT IN AN ORGANIZED HEALTH CARE EDUCATION/TRAINING PROGRAM

## 2020-10-06 PROCEDURE — A9270 NON-COVERED ITEM OR SERVICE: HCPCS | Performed by: STUDENT IN AN ORGANIZED HEALTH CARE EDUCATION/TRAINING PROGRAM

## 2020-10-06 PROCEDURE — 700111 HCHG RX REV CODE 636 W/ 250 OVERRIDE (IP): Performed by: STUDENT IN AN ORGANIZED HEALTH CARE EDUCATION/TRAINING PROGRAM

## 2020-10-06 PROCEDURE — 71275 CT ANGIOGRAPHY CHEST: CPT

## 2020-10-06 PROCEDURE — A9270 NON-COVERED ITEM OR SERVICE: HCPCS | Performed by: INTERNAL MEDICINE

## 2020-10-06 PROCEDURE — 96375 TX/PRO/DX INJ NEW DRUG ADDON: CPT

## 2020-10-06 PROCEDURE — 700111 HCHG RX REV CODE 636 W/ 250 OVERRIDE (IP): Performed by: EMERGENCY MEDICINE

## 2020-10-06 PROCEDURE — 700105 HCHG RX REV CODE 258: Performed by: STUDENT IN AN ORGANIZED HEALTH CARE EDUCATION/TRAINING PROGRAM

## 2020-10-06 PROCEDURE — 700102 HCHG RX REV CODE 250 W/ 637 OVERRIDE(OP): Performed by: STUDENT IN AN ORGANIZED HEALTH CARE EDUCATION/TRAINING PROGRAM

## 2020-10-06 PROCEDURE — 700101 HCHG RX REV CODE 250: Performed by: STUDENT IN AN ORGANIZED HEALTH CARE EDUCATION/TRAINING PROGRAM

## 2020-10-06 PROCEDURE — 700111 HCHG RX REV CODE 636 W/ 250 OVERRIDE (IP)

## 2020-10-06 RX ORDER — ONDANSETRON 2 MG/ML
4 INJECTION INTRAMUSCULAR; INTRAVENOUS EVERY 4 HOURS PRN
Status: DISCONTINUED | OUTPATIENT
Start: 2020-10-06 | End: 2020-10-07 | Stop reason: HOSPADM

## 2020-10-06 RX ORDER — POLYETHYLENE GLYCOL 3350 17 G/17G
1 POWDER, FOR SOLUTION ORAL
Status: DISCONTINUED | OUTPATIENT
Start: 2020-10-06 | End: 2020-10-07 | Stop reason: HOSPADM

## 2020-10-06 RX ORDER — AMOXICILLIN 250 MG
2 CAPSULE ORAL 2 TIMES DAILY
Status: DISCONTINUED | OUTPATIENT
Start: 2020-10-06 | End: 2020-10-07 | Stop reason: HOSPADM

## 2020-10-06 RX ORDER — LABETALOL HYDROCHLORIDE 5 MG/ML
5 INJECTION, SOLUTION INTRAVENOUS ONCE
Status: COMPLETED | OUTPATIENT
Start: 2020-10-06 | End: 2020-10-06

## 2020-10-06 RX ORDER — ACETAMINOPHEN 325 MG/1
650 TABLET ORAL EVERY 6 HOURS PRN
Status: DISCONTINUED | OUTPATIENT
Start: 2020-10-06 | End: 2020-10-07 | Stop reason: HOSPADM

## 2020-10-06 RX ORDER — AMLODIPINE BESYLATE 5 MG/1
2.5 TABLET ORAL
Status: DISCONTINUED | OUTPATIENT
Start: 2020-10-06 | End: 2020-10-07 | Stop reason: HOSPADM

## 2020-10-06 RX ORDER — HYDROMORPHONE HYDROCHLORIDE 1 MG/ML
1 INJECTION, SOLUTION INTRAMUSCULAR; INTRAVENOUS; SUBCUTANEOUS ONCE
Status: COMPLETED | OUTPATIENT
Start: 2020-10-06 | End: 2020-10-06

## 2020-10-06 RX ORDER — TAMSULOSIN HYDROCHLORIDE 0.4 MG/1
0.4 CAPSULE ORAL
Status: DISCONTINUED | OUTPATIENT
Start: 2020-10-06 | End: 2020-10-07 | Stop reason: HOSPADM

## 2020-10-06 RX ORDER — KETOROLAC TROMETHAMINE 10 MG/1
10 TABLET, FILM COATED ORAL EVERY 6 HOURS PRN
Status: DISCONTINUED | OUTPATIENT
Start: 2020-10-06 | End: 2020-10-07 | Stop reason: HOSPADM

## 2020-10-06 RX ORDER — SODIUM CHLORIDE, SODIUM LACTATE, POTASSIUM CHLORIDE, CALCIUM CHLORIDE 600; 310; 30; 20 MG/100ML; MG/100ML; MG/100ML; MG/100ML
INJECTION, SOLUTION INTRAVENOUS CONTINUOUS
Status: DISCONTINUED | OUTPATIENT
Start: 2020-10-06 | End: 2020-10-07 | Stop reason: HOSPADM

## 2020-10-06 RX ORDER — BISACODYL 10 MG
10 SUPPOSITORY, RECTAL RECTAL
Status: DISCONTINUED | OUTPATIENT
Start: 2020-10-06 | End: 2020-10-07 | Stop reason: HOSPADM

## 2020-10-06 RX ORDER — KETOROLAC TROMETHAMINE 30 MG/ML
15 INJECTION, SOLUTION INTRAMUSCULAR; INTRAVENOUS ONCE
Status: COMPLETED | OUTPATIENT
Start: 2020-10-06 | End: 2020-10-06

## 2020-10-06 RX ADMIN — ACETAMINOPHEN 650 MG: 325 TABLET, FILM COATED ORAL at 14:06

## 2020-10-06 RX ADMIN — AMLODIPINE BESYLATE 2.5 MG: 5 TABLET ORAL at 04:22

## 2020-10-06 RX ADMIN — HYDROMORPHONE HYDROCHLORIDE 1 MG: 1 INJECTION, SOLUTION INTRAMUSCULAR; INTRAVENOUS; SUBCUTANEOUS at 01:53

## 2020-10-06 RX ADMIN — KETOROLAC TROMETHAMINE 15 MG: 30 INJECTION, SOLUTION INTRAMUSCULAR; INTRAVENOUS at 03:21

## 2020-10-06 RX ADMIN — HYDROMORPHONE HYDROCHLORIDE 1 MG: 1 INJECTION, SOLUTION INTRAMUSCULAR; INTRAVENOUS; SUBCUTANEOUS at 03:21

## 2020-10-06 RX ADMIN — LABETALOL HYDROCHLORIDE 5 MG: 5 INJECTION, SOLUTION INTRAVENOUS at 04:23

## 2020-10-06 RX ADMIN — SODIUM CHLORIDE, POTASSIUM CHLORIDE, SODIUM LACTATE AND CALCIUM CHLORIDE: 600; 310; 30; 20 INJECTION, SOLUTION INTRAVENOUS at 01:59

## 2020-10-06 RX ADMIN — HYDROMORPHONE HYDROCHLORIDE 1 MG: 1 INJECTION, SOLUTION INTRAMUSCULAR; INTRAVENOUS; SUBCUTANEOUS at 00:18

## 2020-10-06 RX ADMIN — ENOXAPARIN SODIUM 40 MG: 40 INJECTION SUBCUTANEOUS at 04:22

## 2020-10-06 RX ADMIN — DOCUSATE SODIUM 50 MG AND SENNOSIDES 8.6 MG 2 TABLET: 8.6; 5 TABLET, FILM COATED ORAL at 17:30

## 2020-10-06 RX ADMIN — ACETAMINOPHEN 650 MG: 325 TABLET, FILM COATED ORAL at 06:23

## 2020-10-06 RX ADMIN — SODIUM CHLORIDE, POTASSIUM CHLORIDE, SODIUM LACTATE AND CALCIUM CHLORIDE: 600; 310; 30; 20 INJECTION, SOLUTION INTRAVENOUS at 10:57

## 2020-10-06 RX ADMIN — SODIUM CHLORIDE, POTASSIUM CHLORIDE, SODIUM LACTATE AND CALCIUM CHLORIDE: 600; 310; 30; 20 INJECTION, SOLUTION INTRAVENOUS at 18:29

## 2020-10-06 RX ADMIN — TAMSULOSIN HYDROCHLORIDE 0.4 MG: 0.4 CAPSULE ORAL at 09:30

## 2020-10-06 RX ADMIN — KETOROLAC TROMETHAMINE 10 MG: 10 TABLET, FILM COATED ORAL at 17:31

## 2020-10-06 RX ADMIN — CEFTRIAXONE SODIUM 2 G: 2 INJECTION, POWDER, FOR SOLUTION INTRAMUSCULAR; INTRAVENOUS at 17:33

## 2020-10-06 RX ADMIN — KETOROLAC TROMETHAMINE 10 MG: 10 TABLET, FILM COATED ORAL at 10:52

## 2020-10-06 RX ADMIN — DOCUSATE SODIUM 50 MG AND SENNOSIDES 8.6 MG 2 TABLET: 8.6; 5 TABLET, FILM COATED ORAL at 05:12

## 2020-10-06 ASSESSMENT — LIFESTYLE VARIABLES
EVER FELT BAD OR GUILTY ABOUT YOUR DRINKING: NO
ON A TYPICAL DAY WHEN YOU DRINK ALCOHOL HOW MANY DRINKS DO YOU HAVE: 0
CONSUMPTION TOTAL: NEGATIVE
TOTAL SCORE: 0
TOTAL SCORE: 0
HOW MANY TIMES IN THE PAST YEAR HAVE YOU HAD 5 OR MORE DRINKS IN A DAY: 0
ALCOHOL_USE: NO
TOTAL SCORE: 0
HAVE PEOPLE ANNOYED YOU BY CRITICIZING YOUR DRINKING: NO
EVER HAD A DRINK FIRST THING IN THE MORNING TO STEADY YOUR NERVES TO GET RID OF A HANGOVER: NO
HAVE YOU EVER FELT YOU SHOULD CUT DOWN ON YOUR DRINKING: NO
AVERAGE NUMBER OF DAYS PER WEEK YOU HAVE A DRINK CONTAINING ALCOHOL: 0
DOES PATIENT WANT TO STOP DRINKING: NO

## 2020-10-06 ASSESSMENT — ENCOUNTER SYMPTOMS
HEMOPTYSIS: 0
NAUSEA: 1
CHILLS: 1
VOMITING: 0
FLANK PAIN: 1
DEPRESSION: 0
DIZZINESS: 0
COUGH: 0
NECK PAIN: 0
HEADACHES: 0
FEVER: 0
BLURRED VISION: 0
CHILLS: 0
HEARTBURN: 0
MYALGIAS: 0
ABDOMINAL PAIN: 0
DOUBLE VISION: 0
PALPITATIONS: 0
NAUSEA: 0

## 2020-10-06 ASSESSMENT — PATIENT HEALTH QUESTIONNAIRE - PHQ9
SUM OF ALL RESPONSES TO PHQ9 QUESTIONS 1 AND 2: 0
2. FEELING DOWN, DEPRESSED, IRRITABLE, OR HOPELESS: NOT AT ALL
1. LITTLE INTEREST OR PLEASURE IN DOING THINGS: NOT AT ALL

## 2020-10-06 ASSESSMENT — PAIN DESCRIPTION - PAIN TYPE
TYPE: ACUTE PAIN

## 2020-10-06 ASSESSMENT — COGNITIVE AND FUNCTIONAL STATUS - GENERAL
MOBILITY SCORE: 23
SUGGESTED CMS G CODE MODIFIER DAILY ACTIVITY: CH
DAILY ACTIVITIY SCORE: 24
SUGGESTED CMS G CODE MODIFIER MOBILITY: CI
CLIMB 3 TO 5 STEPS WITH RAILING: A LITTLE

## 2020-10-06 NOTE — ED NOTES
Assist RN at bedside to place PIV with US guidance after multiple attempts by this RN. ERP at bedside.

## 2020-10-06 NOTE — CARE PLAN
Problem: Communication  Goal: The ability to communicate needs accurately and effectively will improve  Outcome: PROGRESSING AS EXPECTED  Intervention: Educate patient and significant other/support system about the plan of care, procedures, treatments, medications and allow for questions  Flowsheets (Taken 10/6/2020 1238)  Pt & Family Have Been Educated on Methods Available to Report Concerns Related to Care, Treatment, Services, and Patient Safety Issues: Yes     Problem: Venous Thromboembolism (VTW)/Deep Vein Thrombosis (DVT) Prevention:  Goal: Patient will participate in Venous Thrombosis (VTE)/Deep Vein Thrombosis (DVT)Prevention Measures  Outcome: PROGRESSING AS EXPECTED

## 2020-10-06 NOTE — ED TRIAGE NOTES
"Chief Complaint   Patient presents with   • Abdominal Pain   • Flank Pain     44 yo female ambulatory to triage for above complaint. Pt sent here from  for CT renal to rule out kidney stone. Pt reports 6/10 stabbing L flank pain and bilateral lower abdominal cramping. Denies painful urination. Hypertensive in triage, hx of single kidney.    Educated on triage process, encourage to inform staff of any changes. Charge RN notified.     BP (!) 184/129   Pulse (!) 110   Temp 37.2 °C (99 °F) (Temporal)   Resp 16   Ht 1.702 m (5' 7\")   Wt 112 kg (246 lb 14.6 oz)   SpO2 98%   BMI 38.67 kg/m²   "

## 2020-10-06 NOTE — PROGRESS NOTES
2 RN Skin Check    2 RN skin check complete with GRAZYNA Anaya. Pt is a new admit.   Devices in place: nasal cannula, PIV in right forearm   Skin assessed under devices: yes.  Confirmed pressure ulcers found on: NA.  New potential pressure ulcers noted on NA  Wound consult placed: no   The following interventions in place: pillows used for support and repositioning, on a pressure redistribution mattress, encouraging mobility and turns, moisturizer     Pt skin is intact. Bony prominences are intact.

## 2020-10-06 NOTE — ED NOTES
Back to  from CT. C/o persistent lower abd pain; ERP notified, new order received, medicated per MAR. COVID swab collected and sent.

## 2020-10-06 NOTE — ASSESSMENT & PLAN NOTE
Patient is hemodynamically stable at this time, lactic acid wnl, no leukocytosis.   IVF LR bolus given 1 L by ERP   Continue with maintenance IVF LR at 125 ml/hr   Blood cultures x 2 in process  Urine culture in process   Continue with IV Rocephin 2 grams every 24 hours   De escalate antibiotics based on culture results and sensitivity.   Check CPK   Covid 19 screening swab in process   Tylenol prn for fever  Avoid NSAID's   Pain control

## 2020-10-06 NOTE — CONSULTS
[facility] Consult/H&P Note      Attending: Fabien Kevin M.D.  Patient's Name/MRN: Leslie Nazario, [id number]    Admit Date:10/5/2020  Today's Date: 10/6/2020   Length of stay:  LOS: 0 days   Room #: S524/02      Reason for consult/chief complaint: 1-2 mm left ureteral stone, UTI  ID/HPI: Leslie Nazario is a 45 y.o. female patient s/p R nephrectomy in 2010 who presented on 10/5/2020 to [facility] ER from [facility] with lower abdominal pain and left flank tenderness associated with chills and weakness found to UA consistent for urinary tract infection. CT abdomen/pelvis done showed inflammatory stranding around the left renal pelvis and the left ureter with subtle hyperdensity in the distal left ureter likely representing tiny calculi. Patient was placed on rocephin, given flomax and pain control for medical expulsive therapy and is feeling well today. Her WBC and Cr are in normal limits and vital signs are stable.        Past Medical History:   Past Medical History:   Diagnosis Date   • Depression    • HTN     resolved 8/10   • Pain 01-24-14    abd., 0/10   • Pain     right hip   • Renal disorder     right nephrectomy 2010   • S/p nephrectomy 2010    right removed        Past Surgical History:   Past Surgical History:   Procedure Laterality Date   • HIP ARTHROSCOPY  12/18/2014    Performed by Benji Lott M.D. at [facility]   • FEMORAL NECK OSTEOPLASTY  12/18/2014    Performed by Benji Lott M.D. at [facility]   • ACETABULAR OSTEOPLASTY  12/18/2014    Performed by Benji Lott M.D. at [facility]   • SYNOVECTOMY  12/18/2014    Performed by Benji Lott M.D. at [facility]   • LAPAROSCOPY  1/27/2014    Performed by Ihsan Dior M.D. at SURGERY [facility]   • LYSIS ADHESIONS GENERAL  1/27/2014    Performed by Ihsan Dior M.D. at [facility]   • CASSANDRA BY  "LAPAROSCOPY  12/11/2013    Performed by Ihsan Dior M.D. at SURGERY [facility] ORS   • CHOLECYSTECTOMY  2013   • NEPHRECTOMY LAPAROSCOPIC  2010    Right- Performed by NINA CORREA at SURGERY [facility] ORS   • PB NEPHRECTOMY  2010   • CYSTOSCOPY STENT PLACEMENT  8/11/2009    Performed by KIMBERLY PALACIOS at SURGERY [facility] ORS   • URETEROSCOPY  8/11/2009    Performed by KIMBERLY PALACIOS at SURGERY [facility] ORS   • STONE RETRIEVAL  8/11/2009    Performed by KIBMERLY PALACIOS at SURGERY [facility] ORS   • STENT REMOVAL  8/11/2009    Performed by KIMBERLY PALACIOS at SURGERY [facility] ORS   • LAPAROTOMY  2009    bladder and ureter reconstruction   • URETERAL REIMPLANTATION  4/23/08    Performed by KIMBERLY PALACIOS at SURGERY [facility] ORS   • STENT PLACEMENT  4/23/08    Performed by KIMBERLY PALACIOS at SURGERY [facility] ORS   • LYSIS ADHESIONS GENERAL  5/06   • ABDOMINAL HYSTERECTOMY TOTAL  2005    Hysterectomy, Total Abdominal   • LAPAROTOMY  2005   • TONSILLECTOMY  2004   • APPENDECTOMY  1991   • IR-URETERAL STENT ANTEGRADE      R side   • OTHER  [id number]    \"multiple procedures prior to nephrectom , stent placement/ nephrostomy tube    • URETEROSCOPY      partial removal of ureter with reimplantation        Family History:   Family History   Problem Relation Age of Onset   • Hypertension Mother    • Hypertension Father    • Hypertension Brother    • Genitourinary () Problems Brother         kidney stones         Social History:   Social History     Tobacco Use   • Smoking status: Never Smoker   • Smokeless tobacco: Never Used   Substance Use Topics   • Alcohol use: Yes     Alcohol/week: 0.0 oz     Comment: 2 per month   • Drug use: No      Social History     Social History Narrative   • Not on file        Allergies: she Nkda [no known drug allergy]    Medications:   Medications Prior to Admission   Medication Sig Dispense Refill Last Dose   • acetaminophen (TYLENOL) 325 MG Tab " "Take 650 mg by mouth every four hours as needed.   10/5/2020 at Unknown time   • asa/apap/caffeine (EXCEDRIN) 250-250-65 MG Tab Take 1 Tab by mouth every 6 hours as needed for Headache.            Review of Systems  Review of Systems   Constitutional: Negative for chills and fever.   Cardiovascular: Negative for chest pain.   Gastrointestinal: Negative for nausea and vomiting.   Genitourinary: Positive for flank pain. Negative for dysuria, frequency, hematuria and urgency.   Neurological: Negative for dizziness.   All other systems reviewed and are negative.       Physical Exam  VITAL SIGNS: /88   Pulse 83   Temp 36.4 °C (97.6 °F) (Temporal)   Resp 18   Ht 1.702 m (5' 7\")   Wt 112 kg (246 lb 14.6 oz)   SpO2 97%   BMI 38.67 kg/m²   Physical Exam   Constitutional: She is oriented to person, place, and time and well-developed, well-nourished, and in no distress.   HENT:   Head: Normocephalic and atraumatic.   Neck: Normal range of motion.   Pulmonary/Chest: Effort normal.   Abdominal: Soft.   Neurological: She is alert and oriented to person, place, and time.   Skin: Skin is warm and dry.   Psychiatric: Mood, memory, affect and judgment normal.         Labs:  Recent Labs     10/05/20  2137   WBC 10.7   RBC 5.54*   HEMOGLOBIN 16.5*   HEMATOCRIT 49.0*   MCV 88.4   MCH 29.8   MCHC 33.7   RDW 41.8   PLATELETCT 214   MPV 11.2     Recent Labs     10/05/20  2137   SODIUM 137   POTASSIUM 3.8   CHLORIDE 102   CO2 20   GLUCOSE 98   BUN 9   CREATININE 0.87   CALCIUM 9.9         Glucose:  Recent Labs     10/05/20  2137   GLUCOSE 98     Coags:  No results for input(s): INR in the last 72 hours.      Urinalysis:   Recent Labs     10/05/20  2137   COLORURINE Yellow   CLARITY Cloudy*   SPECGRAVITY 1.009   PHURINE 6.0   GLUCOSEUR Negative   KETONES Negative   NITRITE Positive*   OCCULTBLOOD Moderate*   RBCURINE 2-5*   BACTERIA Many*   EPITHELCELL Negative       Imaging:  CT-CTA CHEST PULMONARY ARTERY W/ RECONS   Final " Result      No pulmonary embolus is identified            CT-RENAL COLIC EVALUATION(A/P W/O)   Final Result      Inflammatory stranding around the left renal pelvis and the left ureter with subtle hyperdensity in the distal left ureter likely representing tiny calculi.           Assessment/Recommendation   45 y.o F with 1-2 mm left ureteral stone, UTI.  We would asked that hospitalist continue antibiotics and pain control.  We would ask that patient continue medical expulsive therapy with flomax.  As her stone is so small, we believe that she is a good chance of passing this on her own without the need for surgical intervention at this time.  Urology will be signing off, we do not anticipate acute urological intervention.  We would asked that patient be discharged with 1 to 2 weeks of oral antibiotics.  We will have her follow-up in our office in 3 to 4 weeks.     Plan of care directed by Dr. Gaming. Case discussed with patient, and Urology- Dr. Gaming. Thank you for this consult. Please contact us with questions.          Martine Hui, PSylvesterA.-C.   [address] [address]   [phone]

## 2020-10-06 NOTE — CARE PLAN
Problem: Safety  Goal: Will remain free from injury  Outcome: PROGRESSING AS EXPECTED  Note: Bed in locked and lowest position. Three side rails up. Call light within reach. Patient instructed on use of call bell and how to call for assistance. Threaded socks in use. Objects in room cleared for ambulation. Pt is upself and knows when to call appropriately.      Problem: Knowledge Deficit  Goal: Knowledge of disease process/condition, treatment plan, diagnostic tests, and medications will improve  Outcome: PROGRESSING AS EXPECTED  Note: This RN explained medications, assessment and interventions to pt. Pt verbalized understanding.

## 2020-10-06 NOTE — PROGRESS NOTES
Assumed care at 0700, report received from NOC RN.  Pt A&O x 4, states pain is 06/10 in mid abdomen. Heat pack applied. WCTM. Pt independent with ADL's. Up self to ambulate. Bed locked, 2 rails up, bed in lowest position. Call light in place, belongings at bedside, no needs at this time and hourly rounding in place.

## 2020-10-06 NOTE — PROGRESS NOTES
Subjective:      Leslie Nazario is a 45 y.o. 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     Employer: [employer]   Social Needs   • Financial resource strain: Not on file   • Food insecurity     Worry: Not on file     Inability: Not on file   • Transportation needs     Medical: Not on file     Non-medical: Not on file   Tobacco Use   • Smoking status: Never Smoker   • Smokeless tobacco: Never Used   Substance and Sexual Activity   • Alcohol use: Yes     Alcohol/week: 0.0 oz     Comment: 2 per month   • Drug use: No   • Sexual activity: Yes     Partners: Male   Lifestyle   • Physical activity     Days per week: Not on file     Minutes per session: Not on file   • Stress: Not on file   Relationships   • Social connections     Talks on phone: Not on file     Gets together: Not on file     Attends Nondenominational service: Not on file     Active member of club or organization: Not on file     Attends meetings of clubs or organizations: Not on file     Relationship status: Not on file   • Intimate partner violence     Fear of current or ex partner: Not on file     Emotionally abused: Not on file     Physically abused: Not on file     Forced sexual activity: Not on file   Other Topics Concern   • Not on file   Social History Narrative   • Not on file     Family History   Problem Relation Age of Onset   • Hypertension Mother    • Hypertension Father    • Hypertension  "Brother    • Genitourinary () Problems Brother         kidney stones       Allergies: Nkda [no known drug allergy]    Patient is a 45-year-old female who presents today with complaint of acute onset of dysuria, urgency, and frequency with left flank pain.  She endorses body aches and chills with general malaise.  Positive history of recurrent kidney stones in the right kidney which necessitated a nephrectomy.  States she is concerned at this point about having a kidney stone on the left side as her symptoms are identical to what she has experienced with kidney stones previously.        Other  This is a new problem. The current episode started in the past 7 days. The problem occurs constantly. The problem has been unchanged. Associated symptoms include urinary symptoms. Pertinent negatives include no chills or fever. Nothing aggravates the symptoms. She has tried nothing for the symptoms. The treatment provided no relief.       Review of Systems   Constitutional: Negative for chills and fever.   Genitourinary: Positive for dysuria, frequency and urgency. Negative for flank pain and hematuria.   All other systems reviewed and are negative.         Objective:     /90 (BP Location: Left arm, Patient Position: Sitting, BP Cuff Size: Large adult)   Pulse (!) 105   Temp 36.8 °C (98.2 °F) (Temporal)   Resp 16   Ht 1.702 m (5' 7\")   Wt 112 kg (247 lb)   SpO2 97%   BMI 38.69 kg/m²      Physical Exam  Vitals signs reviewed.   Constitutional:       Appearance: Normal appearance.   Neck:      Musculoskeletal: Normal range of motion and neck supple.   Cardiovascular:      Rate and Rhythm: Normal rate and regular rhythm.      Heart sounds: Normal heart sounds.   Pulmonary:      Effort: Pulmonary effort is normal.      Breath sounds: Normal breath sounds.   Abdominal:      General: Abdomen is flat.      Tenderness: There is abdominal tenderness. There is left CVA tenderness. There is no right CVA tenderness, guarding " or rebound.      Comments: Positive left CVA tenderness   Musculoskeletal: Normal range of motion.   Skin:     General: Skin is warm and dry.   Neurological:      Mental Status: She is alert and oriented to person, place, and time.   Psychiatric:         Mood and Affect: Mood normal.         Behavior: Behavior normal.         Thought Content: Thought content normal.         Judgment: Judgment normal.     UA: Positive nitrates, small leukocytes, moderate blood.    At this time, I do not have access to outpatient imaging that would allow for us to do a renal colic CT to rule out a kidney stone.  Based on patient's symptoms and history and concern for kidney stones on the left side I have referred her on to ER for further evaluation and higher level of care.  Patient verbalized understanding and agreement, states she will present to [facility] ER for further evaluation at this time.            Assessment/Plan:        1. Dysuria  2.  Cystitis  3.  Left flank pain    See note above  Patient referred on to [facility] ED for further evaluation and higher level of care

## 2020-10-06 NOTE — DISCHARGE PLANNING
Care Transition Team Assessment    Student MANSI met with Pt at bedside to complete assessment. MANSI verified face sheet information- PCP Dr. Ruiz, address, phone #, emergency contacts, and insurance. Pt states her support system includes mother, , and three children (two in college, 1 in highschool). Pt lives in a two-story house with  and child who is in highschool. Pt works at [employer].     Pt is a 45 year old  female. Pt denies history of substance abuse, reported having depression 20 years ago and took prescription medication for depression for 2 years. Previous to hospital admission, pt reports independence and no difficulty with ADLs/IADLs or driving. SW offered information on obtaining an Advanced Directive, pt declined. Pt states daughter or  will pick her up from hospital depending on when she is discharged.     Information Source  Orientation : Oriented x 4  Information Given By: Patient  Informant's Name: Leslie  Who is responsible for making decisions for patient? : Patient    Readmission Evaluation  Is this a readmission?: No    Elopement Risk  Legal Hold: No  Ambulatory or Self Mobile in Wheelchair: Yes  Disoriented: No  Psychiatric Symptoms: None  History of Wandering: No  Elopement this Admit: No  Vocalizing Wanting to Leave: No  Displays Behaviors, Body Language Wanting to Leave: No-Not at Risk for Elopement  Elopement Risk: Not at Risk for Elopement    Interdisciplinary Discharge Planning  Lives with - Patient's Self Care Capacity: Spouse  Patient or legal guardian wants to designate a caregiver: Yes  Caregiver name: Colleen Garcia  Caregiver contact info: [phone]  Support Systems: Children  Housing / Facility: 2 Story House  Name of Care Facility: NA  Durable Medical Equipment: Not Applicable    Discharge Preparedness   What is your plan after discharge?: Home with help  What are your discharge supports?: Child, Parent, Spouse  Prior Functional Level: Ambulatory,  Drives Self, Independent with Activities of Daily Living, Independent with Medication Management  Difficulity with ADLs: None  Difficulity with IADLs: None    Functional Assesment  Prior Functional Level: Ambulatory, Drives Self, Independent with Activities of Daily Living, Independent with Medication Management    Finances  Financial Barriers to Discharge: No  Prescription Coverage: Yes    Vision / Hearing Impairment  Vision Impairment : Yes  Right Eye Vision: Wears Glasses  Left Eye Vision: Wears Glasses  Hearing Impairment : No    Advance Directive  Advance Directive?: None  Advance Directive offered?: AD Booklet refused    Domestic Abuse  Have you ever been the victim of abuse or violence?: No  Physical Abuse or Sexual Abuse: No  Verbal Abuse or Emotional Abuse: No  Possible Abuse/Neglect Reported to:: Not Applicable    Psychological Assessment  History of Substance Abuse: None  History of Psychiatric Problems: Yes(Depression 20yrs ago)  Non-compliant with Treatment: No  Newly Diagnosed Illness: Yes    Discharge Risks or Barriers  Discharge risks or barriers?: No    Anticipated Discharge Information   Discharge Disposition: Discharged to home/self care (01)  Discharge Address: [facility] [address]  Discharge Contact Phone Number: [phone]      Reviewed by ANA Calero, MSW

## 2020-10-06 NOTE — ED NOTES
Telephone report to GRAZYNA Bennett. Hospitalist paged for PRN analgesia and anti-hypertensives. Lab called for add-ons.

## 2020-10-06 NOTE — H&P
Hospital Medicine History & Physical Note    Date of Service  10/6/2020    Primary Care Physician  Love Ruiz M.D.    Consultants  ERP discussed with Urology - follow up in AM     Code Status  Full Code    Chief Complaint  Chief Complaint   Patient presents with   • Abdominal Pain   • Flank Pain       History of Presenting Illness  45 year old female with PMHx of multiple surgeries including cholecystectomy, appendectomy, C section, lysis of abdominal adhesions, hx of nephrostomy tube placement, s/p R nephrectomy in 2010 who presented on 10/5/2020 to [facility] ER from [facility] with lower abdominal pain and left flank tenderness associated with chills and weakness found to UA consistent for urinary tract infection. CT abdomen/pelvis done showed inflammatory stranding around the left renal pelvis and the left ureter with subtle hyperdensity in the distal left ureter likely representing tiny calculi. Labs were significant for an elevated D dimer, subsequently a CTPA done was negative for a pulmonary embolus. Of note, patient has history of multiple kidney stones in the past and recent bladder infection in august 2020.     Patient examined bedside is alert awake oriented x 3 not in any apparent distress.     Review of Systems  Review of Systems   Constitutional: Positive for chills and malaise/fatigue. Negative for fever.   HENT: Negative for hearing loss and tinnitus.    Eyes: Negative for blurred vision and double vision.   Respiratory: Negative for cough and hemoptysis.    Cardiovascular: Negative for chest pain and palpitations.   Gastrointestinal: Positive for nausea. Negative for abdominal pain, heartburn and vomiting.   Genitourinary: Positive for dysuria and urgency.   Musculoskeletal: Negative for myalgias and neck pain.   Neurological: Negative for dizziness and headaches.   Psychiatric/Behavioral: Negative for depression and suicidal ideas.       Past Medical History   has a past  medical history of Depression, HTN, Pain (01-24-14), Pain, Renal disorder, and S/p nephrectomy (2010).    Surgical History   has a past surgical history that includes lysis adhesions general (5/06); IR-URETERAL STENT ANTEGRADE; ureteral reimplantation (4/23/08); stent placement (4/23/08); ureteroscopy; cystoscopy stent placement (8/11/2009); ureteroscopy (8/11/2009); stone retrieval (8/11/2009); stent removal (8/11/2009); nephrectomy laparoscopic (2010); roe by laparoscopy (12/11/2013); abdominal hysterectomy total (2005); cholecystectomy (2013); appendectomy (1991); laparoscopy (1/27/2014); lysis adhesions general (1/27/2014); pr nephrectomy (2010); tonsillectomy (2004); laparotomy (2005); laparotomy (2009); other (0624-4820); hip arthroscopy (12/18/2014); femoral neck osteoplasty (12/18/2014); acetabular osteoplasty (12/18/2014); and synovectomy (12/18/2014).     Family History  family history includes Genitourinary () Problems in her brother; Hypertension in her brother, father, and mother.     Social History   reports that she has never smoked. She has never used smokeless tobacco. She reports current alcohol use. She reports that she does not use drugs.    Allergies  Allergies   Allergen Reactions   • Nkda [No Known Drug Allergy]        Medications  Prior to Admission Medications   Prescriptions Last Dose Informant Patient Reported? Taking?   Dextromethorphan-Guaifenesin (COUGH & CHEST CONGESTION DM) 5-100 MG/5ML Liquid   Yes No   Sig: Take  by mouth.   Esomeprazole Magnesium (NEXIUM) 10 MG Pack   Yes No   Sig: Take  by mouth.   PLENVU 140 g Recon Soln   Yes No   Sig: MIX AND DRINK 140-9-5.2G BY MOUTH IN AN 8OZ LIQUID ONCE. *PT WAS GIVEN INSTRUCTIONS AT OFFICE   acetaminophen (TYLENOL) 325 MG Tab   Yes No   Sig: Take 650 mg by mouth every four hours as needed.   asa/apap/caffeine (EXCEDRIN) 250-250-65 MG Tab   Yes No   Sig: Take 1 Tab by mouth every 6 hours as needed for Headache.   hydrocortisone  (ANUSOL-HC) 25 MG Suppos   No No   Sig: Insert 1 Suppository in rectum every 12 hours. For three days.  Okay to continue for six days if needed.   Patient not taking: Reported on 12/23/2019   ibuprofen (MOTRIN) 200 MG Tab   Yes No   Sig: Take 200 mg by mouth every 6 hours as needed.   lactulose 10 GM/15ML Solution   Yes No   Sig: USE 15ML BY MOUTH 1 HR PRIOR TO TESTING   metoprolol SR (TOPROL XL) 25 MG TABLET SR 24 HR   No No   Sig: Take 1 Tab by mouth every day.   Patient not taking: Reported on 12/23/2019   raNITIdine HCl (ZANTAC PO)   Yes No   Sig: Take  by mouth.   tamsulosin (FLOMAX) 0.4 MG capsule   No No   Sig: Take 1 Cap by mouth ONE-HALF HOUR AFTER BREAKFAST.   Patient not taking: Reported on 12/23/2019      Facility-Administered Medications: None       Physical Exam  Temp:  [37.2 °C (99 °F)] 37.2 °C (99 °F)  Pulse:  [] 88  Resp:  [16-22] 21  BP: (158-203)/() 158/90  SpO2:  [92 %-99 %] 99 %    Physical Exam  Constitutional:       Appearance: Normal appearance. She is obese.   HENT:      Head: Normocephalic and atraumatic.      Nose: Nose normal.      Mouth/Throat:      Mouth: Mucous membranes are dry.   Eyes:      Extraocular Movements: Extraocular movements intact.      Pupils: Pupils are equal, round, and reactive to light.   Neck:      Musculoskeletal: Neck supple.   Cardiovascular:      Rate and Rhythm: Normal rate and regular rhythm.      Pulses: Normal pulses.      Heart sounds: Normal heart sounds.   Pulmonary:      Effort: Pulmonary effort is normal. No respiratory distress.      Breath sounds: Normal breath sounds. No stridor. No wheezing or rhonchi.   Abdominal:      Palpations: Abdomen is soft.      Tenderness: There is abdominal tenderness (suprapubic). There is left CVA tenderness.      Comments: Healed midline laparotomy scar noted    Musculoskeletal: Normal range of motion.         General: No swelling or tenderness.   Skin:     General: Skin is warm and dry.   Neurological:       General: No focal deficit present.      Mental Status: She is alert and oriented to person, place, and time.         Laboratory:  Recent Labs     10/05/20  2137   WBC 10.7   RBC 5.54*   HEMOGLOBIN 16.5*   HEMATOCRIT 49.0*   MCV 88.4   MCH 29.8   MCHC 33.7   RDW 41.8   PLATELETCT 214   MPV 11.2     Recent Labs     10/05/20  2137   SODIUM 137   POTASSIUM 3.8   CHLORIDE 102   CO2 20   GLUCOSE 98   BUN 9   CREATININE 0.87   CALCIUM 9.9     Recent Labs     10/05/20  2137   ALTSGPT 21   ASTSGOT 24   ALKPHOSPHAT 97   TBILIRUBIN 0.6   LIPASE 30   GLUCOSE 98         No results for input(s): NTPROBNP in the last 72 hours.      Recent Labs     10/05/20  2137   TROPONINT <6       Imaging:  CT-CTA CHEST PULMONARY ARTERY W/ RECONS   Final Result      No pulmonary embolus is identified            CT-RENAL COLIC EVALUATION(A/P W/O)   Final Result      Inflammatory stranding around the left renal pelvis and the left ureter with subtle hyperdensity in the distal left ureter likely representing tiny calculi.            Assessment/Plan:  I anticipate this patient will require at least two midnights for appropriate medical management, necessitating inpatient admission.    * Complicated urinary tract infection  Assessment & Plan  Patient is hemodynamically stable at this time, lactic acid wnl, no leukocytosis.   IVF LR bolus given 1 L by ERP   Continue with maintenance IVF LR at 125 ml/hr   Blood cultures x 2 in process  Urine culture in process   Continue with IV Rocephin 2 grams every 24 hours   De escalate antibiotics based on culture results and sensitivity.   Check CPK   Covid 19 screening swab in process   Tylenol prn for fever  Avoid NSAID's   Pain control       Kidney stone  Assessment & Plan  IVF  Flomax 0.4 mg daily     HTN (hypertension)- (present on admission)  Assessment & Plan  Norvasc 2.5 mg daily     Obesity (BMI 35.0-39.9 without comorbidity)- (present on admission)  Assessment & Plan  Patient counseled on dietary  modification.       VTE PPx: lovenox

## 2020-10-06 NOTE — ED PROVIDER NOTES
ED Provider Note    CHIEF COMPLAINT  Chief Complaint   Patient presents with   • Abdominal Pain   • Flank Pain       \[facility]\""    Primary care provider: Love Ruiz M.D.  Means of arrival: POV  History obtained from: Patient  History limited by: Nothing    Leslie Nazario is a 45 y.o. female who presents with left flank pain. Sharp. Onset earlier this morning. Constant and worsening. No aggravating factors noted. Also has suprapubic sharp nonradiating abdominal pain. Feels like prior UTIs and kidney stones, of which she's had both many times. Never has dysuria with infections. Prior nephrectomy due to kidney stones in the distant past. Took acetaminophen today with minimal relief. Pain is severe. No vomiting. No rashes/skin changes. No other recent illness or known COVID contact.    REVIEW OF SYSTEMS  Constitutional: Negative for fever or chills.   HENT: Negative for rhinorrhea or sore throat.    Respiratory: Negative for cough or shortness of breath.    Cardiovascular: Negative for chest pain or palpitations.   Gastrointestinal: Positive for suprapubic abdominal pain, no vomiting.   Genitourinary: Negative for dysuria but positive for left-sided flank pain.   Musculoskeletal: Negative for extremity pain or swelling.  Skin: Negative for itching or rash.   Neurological: Negative for sensory or motor changes.   Psychiatric/Behavioral: Negative for depression or suicidal ideas.   See HPI for further details. All other systems are negative.     PAST MEDICAL HISTORY   has a past medical history of Depression, HTN, Pain (01-24-14), Pain, Renal disorder, and S/p nephrectomy (2010).    PAST FAMILY HISTORY  Family History   Problem Relation Age of Onset   • Hypertension Mother    • Hypertension Father    • Hypertension Brother    • Genitourinary () Problems Brother         kidney stones       SOCIAL HISTORY  Social History     Tobacco Use   • Smoking status: Never Smoker   • Smokeless tobacco: Never Used   Substance and  "Sexual Activity   • Alcohol use: Yes     Alcohol/week: 0.0 oz     Comment: 2 per month   • Drug use: No   • Sexual activity: Yes     Partners: Male       SURGICAL HISTORY   has a past surgical history that includes lysis adhesions general (5/06); IR-URETERAL STENT ANTEGRADE; ureteral reimplantation (4/23/08); stent placement (4/23/08); ureteroscopy; cystoscopy stent placement (8/11/2009); ureteroscopy (8/11/2009); stone retrieval (8/11/2009); stent removal (8/11/2009); nephrectomy laparoscopic (2010); roe by laparoscopy (12/11/2013); abdominal hysterectomy total (2005); cholecystectomy (2013); appendectomy (1991); laparoscopy (1/27/2014); lysis adhesions general (1/27/2014); nephrectomy (2010); tonsillectomy (2004); laparotomy (2005); laparotomy (2009); other (3815-4878); hip arthroscopy (12/18/2014); femoral neck osteoplasty (12/18/2014); acetabular osteoplasty (12/18/2014); and synovectomy (12/18/2014).    CURRENT MEDICATIONS  Home Medications     Reviewed by Donald Barron R.N. (Registered Nurse) on 10/06/20 at 0403  Med List Status: Complete   Medication Last Dose Status   acetaminophen (TYLENOL) 325 MG Tab 10/5/2020 Active   asa/apap/caffeine (EXCEDRIN) 250-250-65 MG Tab  Active                ALLERGIES  Allergies   Allergen Reactions   • Nkda [No Known Drug Allergy]        PHYSICAL EXAM  VITAL SIGNS: /100   Pulse 68   Temp 36.4 °C (97.6 °F) (Temporal)   Resp 17   Ht 1.702 m (5' 7\")   Wt 112 kg (246 lb 14.6 oz)   SpO2 94%   BMI 38.67 kg/m²    Pulse ox interpretation: On room air, I interpret this pulse ox as normal.  Constitutional: Well-developed, well-nourished. Sitting up in moderate distress.   HEENT: Normocephalic, atraumatic. Posterior pharynx clear, mucous membranes dry.  Eyes:  EOMI. Normal sclerae.  Neck: Supple, nontender.  Chest/Pulmonary: Clear to ausculation bilaterally, no wheezes or rhonchi.  Cardiovascular: Regular rate and rhythm, no murmur.   Abdomen: Soft, nontender; no " rebound, guarding, or masses.  Back: L CVA tenderness present, no midline pain.   Musculoskeletal: No deformity or edema.  Neuro: Clear speech, normal coordination, cranial nerves II-XII grossly intact, no focal asymmetry or sensory deficits.   Psych: Distressed but very pleasant and cooperative.  Skin: No rashes, warm and dry.      DIAGNOSTIC STUDIES / PROCEDURES    LABS & EKG  Results for orders placed or performed during the hospital encounter of 10/05/20   CBC WITH DIFFERENTIAL   Result Value Ref Range    WBC 10.7 4.8 - 10.8 K/uL    RBC 5.54 (H) 4.20 - 5.40 M/uL    Hemoglobin 16.5 (H) 12.0 - 16.0 g/dL    Hematocrit 49.0 (H) 37.0 - 47.0 %    MCV 88.4 81.4 - 97.8 fL    MCH 29.8 27.0 - 33.0 pg    MCHC 33.7 33.6 - 35.0 g/dL    RDW 41.8 35.9 - 50.0 fL    Platelet Count 214 164 - 446 K/uL    MPV 11.2 9.0 - 12.9 fL    Neutrophils-Polys 56.50 44.00 - 72.00 %    Lymphocytes 35.70 22.00 - 41.00 %    Monocytes 6.10 0.00 - 13.40 %    Eosinophils 0.90 0.00 - 6.90 %    Basophils 0.00 0.00 - 1.80 %    Nucleated RBC 0.00 /100 WBC    Neutrophils (Absolute) 6.05 2.00 - 7.15 K/uL    Lymphs (Absolute) 3.82 1.00 - 4.80 K/uL    Monos (Absolute) 0.65 0.00 - 0.85 K/uL    Eos (Absolute) 0.10 0.00 - 0.51 K/uL    Baso (Absolute) 0.00 0.00 - 0.12 K/uL    NRBC (Absolute) 0.00 K/uL    Anisocytosis 1+     Microcytosis 1+    COMP METABOLIC PANEL   Result Value Ref Range    Sodium 137 135 - 145 mmol/L    Potassium 3.8 3.6 - 5.5 mmol/L    Chloride 102 96 - 112 mmol/L    Co2 20 20 - 33 mmol/L    Anion Gap 15.0 7.0 - 16.0    Glucose 98 65 - 99 mg/dL    Bun 9 8 - 22 mg/dL    Creatinine 0.87 0.50 - 1.40 mg/dL    Calcium 9.9 8.5 - 10.5 mg/dL    AST(SGOT) 24 12 - 45 U/L    ALT(SGPT) 21 2 - 50 U/L    Alkaline Phosphatase 97 30 - 99 U/L    Total Bilirubin 0.6 0.1 - 1.5 mg/dL    Albumin 4.6 3.2 - 4.9 g/dL    Total Protein 8.0 6.0 - 8.2 g/dL    Globulin 3.4 1.9 - 3.5 g/dL    A-G Ratio 1.4 g/dL   LIPASE   Result Value Ref Range    Lipase 30 11 - 82 U/L    HCG QUAL SERUM   Result Value Ref Range    Beta-Hcg Qualitative Serum Negative Negative   URINALYSIS,CULTURE IF INDICATED    Specimen: Urine, Clean Catch   Result Value Ref Range    Color Yellow     Character Cloudy (A)     Specific Gravity 1.009 <1.035    Ph 6.0 5.0 - 8.0    Glucose Negative Negative mg/dL    Ketones Negative Negative mg/dL    Protein 100 (A) Negative mg/dL    Bilirubin Negative Negative    Urobilinogen, Urine 0.2 Negative    Nitrite Positive (A) Negative    Leukocyte Esterase Moderate (A) Negative    Occult Blood Moderate (A) Negative    Micro Urine Req Microscopic    D-DIMER   Result Value Ref Range    D-Dimer Screen 5.29 (H) 0.00 - 0.50 ug/mL (FEU)   COVID/SARS CoV-2 PCR    Specimen: Nasopharyngeal; Respirate   Result Value Ref Range    COVID Order Status Received    LACTIC ACID   Result Value Ref Range    Lactic Acid 1.4 0.5 - 2.0 mmol/L   TROPONIN   Result Value Ref Range    Troponin T <6 6 - 19 ng/L   URINE MICROSCOPIC (W/UA)   Result Value Ref Range    -150 (A) /hpf    RBC 2-5 (A) /hpf    Bacteria Many (A) None /hpf    Epithelial Cells Negative /hpf    Hyaline Cast 6-10 (A) /lpf   PLATELET ESTIMATE   Result Value Ref Range    Plt Estimation Normal    MORPHOLOGY   Result Value Ref Range    RBC Morphology Present     Large Platelets 1+     Poikilocytosis 1+     Ovalocytes 1+    PERIPHERAL SMEAR REVIEW   Result Value Ref Range    Peripheral Smear Review see below    DIFFERENTIAL MANUAL   Result Value Ref Range    Metamyelocytes 0.90 %    Manual Diff Status PERFORMED    ESTIMATED GFR   Result Value Ref Range    GFR If African American >60 >60 mL/min/1.73 m 2    GFR If Non African American >60 >60 mL/min/1.73 m 2   SARS-CoV-2, PCR (In-House)   Result Value Ref Range    SARS-CoV-2 Source NP Swab     SARS-CoV-2 by PCR NotDetected    Magnesium   Result Value Ref Range    Magnesium 2.0 1.5 - 2.5 mg/dL   CREATINE KINASE   Result Value Ref Range    CPK Total 90 0 - 154 U/L          RADIOLOGY  CT-CTA CHEST PULMONARY ARTERY W/ RECONS   Final Result      No pulmonary embolus is identified            CT-RENAL COLIC EVALUATION(A/P W/O)   Final Result      Inflammatory stranding around the left renal pelvis and the left ureter with subtle hyperdensity in the distal left ureter likely representing tiny calculi.            COURSE & MEDICAL DECISION MAKING    This is a 45 y.o. female who presents with severe L flank pain.     Differential Diagnosis includes but is not limited to:  Pyelonephritis, ureterolithiasis, zoster, kidney injury, PE    ED Course:  45-year-old female with history of UTIs and kidney stones with left flank pain.  Looks very uncomfortable and dehydrated plan to keep no until surgical process is ruled out, IV fluids and pain medications and labs and imaging ordered.    Labs mostly reassuring aside from very infected urine and elevated d-dimer.  Overall risk of clot is low dimer is elevated however so plan to screen with CTA chest.  Kidney function normal.  White count normal.    No PE on chest imaging, there is inflammation around the left renal pelvis and left ureter possible tiny calculi present no other acute disease seen.  Consulted with urologist Dr. Bailey, he agrees with plan for IV ceftriaxone and watching the patient in the hospital his team will round on the patient in the morning obviously if she decompensates he will come in immediately for emergent stent placement but that does not seem indicated at this time given very small size of stones.  Patient updated she understands and recommended, has required several rounds of IV parenteral pain medications but overall improving and feeling better after fluids thus I feel she's had a positive response to parenteral rehydration. Hospitalist consulted will kindly admit, stable for admit to tele in guarded condition.     Medications   tamsulosin (FLOMAX) capsule 0.4 mg (0.4 mg Oral Given 10/6/20 0930)   senna-docusate  (PERICOLACE or SENOKOT S) 8.6-50 MG per tablet 2 Tab (2 Tabs Oral Given 10/6/20 0512)     And   polyethylene glycol/lytes (MIRALAX) PACKET 1 Packet (has no administration in time range)     And   magnesium hydroxide (MILK OF MAGNESIA) suspension 30 mL (has no administration in time range)     And   bisacodyl (DULCOLAX) suppository 10 mg (has no administration in time range)   lactated ringers infusion ( Intravenous New Bag 10/6/20 1057)   acetaminophen (TYLENOL) tablet 650 mg (650 mg Oral Given 10/6/20 1406)   enoxaparin (LOVENOX) inj 40 mg (40 mg Subcutaneous Given 10/6/20 0422)   cefTRIAXone (ROCEPHIN) 2 g in  mL IVPB (has no administration in time range)   amLODIPine (NORVASC) tablet 2.5 mg (2.5 mg Oral Given 10/6/20 0422)   ketorolac (TORADOL) tablet 10 mg (10 mg Oral Given 10/6/20 1052)   ondansetron (ZOFRAN) syringe/vial injection 4 mg (has no administration in time range)   lactated ringers infusion (BOLUS) (0 mL Intravenous Stopped 10/6/20 0158)   cefTRIAXone (ROCEPHIN) 2 g in  mL IVPB (0 g Intravenous Stopped 10/5/20 2314)   HYDROmorphone pf (DILAUDID) injection 1 mg (1 mg Intravenous Given 10/5/20 2229)   ondansetron (ZOFRAN) syringe/vial injection 4 mg (4 mg Intravenous Given 10/5/20 2229)   iohexol (OMNIPAQUE) 350 mg/mL (45 mL Intravenous Given 10/5/20 2358)   HYDROmorphone pf (DILAUDID) injection 1 mg (1 mg Intravenous Given 10/6/20 0018)   HYDROmorphone pf (DILAUDID) injection 1 mg (1 mg Intravenous Given 10/6/20 0153)   ketorolac (TORADOL) injection 15 mg (15 mg Intravenous Given 10/6/20 0321)   HYDROmorphone pf (DILAUDID) injection 1 mg (1 mg Intravenous Given 10/6/20 0321)   labetalol (NORMODYNE/TRANDATE) injection 5 mg (5 mg Intravenous Given 10/6/20 6892)       FINAL IMPRESSION  1. Acute pyelonephritis    2. Complicated urinary tract infection    3. Kidney stone    4. Acute left flank pain    5. Essential hypertension          -ADMIT-      Pertinent Labs & Imaging studies reviewed and  verified by myself, as well as nursing notes and the patient's past medical, family, and social histories (See chart for details).    Portions of this record were made with voice recognition software.  Despite my review, spelling/grammar/context errors may still remain.  Interpretation of this chart should be taken in this context.    Electronically signed by George Adair M.D. on 10/6/2020 at 5:30 PM.

## 2020-10-06 NOTE — PROGRESS NOTES
Patient is a 45-year-old female with history of multiple surgeries including history of nephrostomy tube placement, s/p R nephrectomy in 2010.  She presented with lower abdominal pain and left flank pain on 10/5/2020.  She was found to have UTI and likely calculi left ureter.  Patient is seen and examined by me at bedside this morning, she was still having some pain and chills.  Pain medications were adjusted.  We will continue to monitor throughout the day for stone passage and pain management.  Patient will possibly be discharged tomorrow.

## 2020-10-06 NOTE — ED NOTES
Pt to rm with steady gait. Cloudy urine specimen collected and sent. Labs drawn and sent. Chart up for ERP.

## 2020-10-06 NOTE — PROGRESS NOTES
Pt arrived to unit at 0340. Patient is awake/alert. Assessment completed. Pt on 2 liters of oxygen with  in place. PIV flushed and infusing LR @ 125. Bed locked in lowest position. Call light within reach. Whiteboard updates with nurse's and CNA's numbers. Hourly rounding in place.

## 2020-10-07 ENCOUNTER — PATIENT OUTREACH (OUTPATIENT)
Dept: HEALTH INFORMATION MANAGEMENT | Facility: OTHER | Age: 45
End: 2020-10-07

## 2020-10-07 VITALS
OXYGEN SATURATION: 97 % | WEIGHT: 246.91 LBS | TEMPERATURE: 96.9 F | DIASTOLIC BLOOD PRESSURE: 87 MMHG | HEIGHT: 67 IN | SYSTOLIC BLOOD PRESSURE: 146 MMHG | HEART RATE: 72 BPM | RESPIRATION RATE: 18 BRPM | BODY MASS INDEX: 38.75 KG/M2

## 2020-10-07 PROBLEM — N20.0 KIDNEY STONE: Status: RESOLVED | Noted: 2020-10-06 | Resolved: 2020-10-07

## 2020-10-07 LAB
ANION GAP SERPL CALC-SCNC: 11 MMOL/L (ref 7–16)
BASOPHILS # BLD AUTO: 0.6 % (ref 0–1.8)
BASOPHILS # BLD: 0.04 K/UL (ref 0–0.12)
BUN SERPL-MCNC: 7 MG/DL (ref 8–22)
CALCIUM SERPL-MCNC: 8.6 MG/DL (ref 8.5–10.5)
CHLORIDE SERPL-SCNC: 107 MMOL/L (ref 96–112)
CO2 SERPL-SCNC: 25 MMOL/L (ref 20–33)
CREAT SERPL-MCNC: 0.65 MG/DL (ref 0.5–1.4)
EOSINOPHIL # BLD AUTO: 0.13 K/UL (ref 0–0.51)
EOSINOPHIL NFR BLD: 2 % (ref 0–6.9)
ERYTHROCYTE [DISTWIDTH] IN BLOOD BY AUTOMATED COUNT: 41.2 FL (ref 35.9–50)
GLUCOSE SERPL-MCNC: 103 MG/DL (ref 65–99)
HCT VFR BLD AUTO: 36.6 % (ref 37–47)
HGB BLD-MCNC: 12.4 G/DL (ref 12–16)
IMM GRANULOCYTES # BLD AUTO: 0.02 K/UL (ref 0–0.11)
IMM GRANULOCYTES NFR BLD AUTO: 0.3 % (ref 0–0.9)
LYMPHOCYTES # BLD AUTO: 1.66 K/UL (ref 1–4.8)
LYMPHOCYTES NFR BLD: 26 % (ref 22–41)
MCH RBC QN AUTO: 29.9 PG (ref 27–33)
MCHC RBC AUTO-ENTMCNC: 33.8 G/DL (ref 33.6–35)
MCV RBC AUTO: 88.6 FL (ref 81.4–97.8)
MONOCYTES # BLD AUTO: 0.45 K/UL (ref 0–0.85)
MONOCYTES NFR BLD AUTO: 7.1 % (ref 0–13.4)
NEUTROPHILS # BLD AUTO: 4.08 K/UL (ref 2–7.15)
NEUTROPHILS NFR BLD: 64 % (ref 44–72)
NRBC # BLD AUTO: 0 K/UL
NRBC BLD-RTO: 0 /100 WBC
PLATELET # BLD AUTO: 198 K/UL (ref 164–446)
PMV BLD AUTO: 9.5 FL (ref 9–12.9)
POTASSIUM SERPL-SCNC: 3.7 MMOL/L (ref 3.6–5.5)
RBC # BLD AUTO: 4.11 M/UL (ref 4.2–5.4)
SODIUM SERPL-SCNC: 143 MMOL/L (ref 135–145)
WBC # BLD AUTO: 6.4 K/UL (ref 4.8–10.8)

## 2020-10-07 PROCEDURE — A9270 NON-COVERED ITEM OR SERVICE: HCPCS | Performed by: INTERNAL MEDICINE

## 2020-10-07 PROCEDURE — 80048 BASIC METABOLIC PNL TOTAL CA: CPT

## 2020-10-07 PROCEDURE — 700102 HCHG RX REV CODE 250 W/ 637 OVERRIDE(OP): Performed by: INTERNAL MEDICINE

## 2020-10-07 PROCEDURE — 99239 HOSP IP/OBS DSCHRG MGMT >30: CPT | Performed by: INTERNAL MEDICINE

## 2020-10-07 PROCEDURE — 700105 HCHG RX REV CODE 258: Performed by: STUDENT IN AN ORGANIZED HEALTH CARE EDUCATION/TRAINING PROGRAM

## 2020-10-07 PROCEDURE — 36415 COLL VENOUS BLD VENIPUNCTURE: CPT

## 2020-10-07 PROCEDURE — A9270 NON-COVERED ITEM OR SERVICE: HCPCS | Performed by: STUDENT IN AN ORGANIZED HEALTH CARE EDUCATION/TRAINING PROGRAM

## 2020-10-07 PROCEDURE — 700102 HCHG RX REV CODE 250 W/ 637 OVERRIDE(OP): Performed by: STUDENT IN AN ORGANIZED HEALTH CARE EDUCATION/TRAINING PROGRAM

## 2020-10-07 PROCEDURE — 85025 COMPLETE CBC W/AUTO DIFF WBC: CPT

## 2020-10-07 RX ORDER — CEPHALEXIN 500 MG/1
500 CAPSULE ORAL 2 TIMES DAILY
Qty: 10 CAP | Refills: 0 | Status: SHIPPED | OUTPATIENT
Start: 2020-10-08 | End: 2020-10-13

## 2020-10-07 RX ORDER — KETOROLAC TROMETHAMINE 10 MG/1
10 TABLET, FILM COATED ORAL EVERY 6 HOURS PRN
Qty: 8 TAB | Refills: 0 | Status: SHIPPED | OUTPATIENT
Start: 2020-10-07 | End: 2020-10-09

## 2020-10-07 RX ADMIN — ACETAMINOPHEN 650 MG: 325 TABLET, FILM COATED ORAL at 04:42

## 2020-10-07 RX ADMIN — AMLODIPINE BESYLATE 2.5 MG: 5 TABLET ORAL at 06:01

## 2020-10-07 RX ADMIN — KETOROLAC TROMETHAMINE 10 MG: 10 TABLET, FILM COATED ORAL at 06:01

## 2020-10-07 RX ADMIN — DOCUSATE SODIUM 50 MG AND SENNOSIDES 8.6 MG 2 TABLET: 8.6; 5 TABLET, FILM COATED ORAL at 06:01

## 2020-10-07 RX ADMIN — TAMSULOSIN HYDROCHLORIDE 0.4 MG: 0.4 CAPSULE ORAL at 09:36

## 2020-10-07 RX ADMIN — SODIUM CHLORIDE, POTASSIUM CHLORIDE, SODIUM LACTATE AND CALCIUM CHLORIDE: 600; 310; 30; 20 INJECTION, SOLUTION INTRAVENOUS at 06:00

## 2020-10-07 RX ADMIN — KETOROLAC TROMETHAMINE 10 MG: 10 TABLET, FILM COATED ORAL at 00:00

## 2020-10-07 NOTE — CARE PLAN
Problem: Pain Management  Goal: Pain level will decrease to patient's comfort goal  Outcome: PROGRESSING AS EXPECTED   Patient denies pain at this time.   Problem: Infection  Goal: Will remain free from infection  Outcome: PROGRESSING AS EXPECTED   Patient receiving IV antibiotics to treat diagnosed UTI

## 2020-10-07 NOTE — DISCHARGE INSTRUCTIONS
Discharge Instructions    Discharged to home by car with relative. Discharged via walking, hospital escort: Yes.  Special equipment needed: Not Applicable    Be sure to schedule a follow-up appointment with your primary care doctor or any specialists as instructed.     Discharge Plan:   Diet Plan: Discussed  Activity Level: Discussed  Confirmed Follow up Appointment: Patient to Call and Schedule Appointment  Confirmed Symptoms Management: Discussed  Medication Reconciliation Updated: Yes  Influenza Vaccine Indication: Patient Refuses    I understand that a diet low in cholesterol, fat, and sodium is recommended for good health. Unless I have been given specific instructions below for another diet, I accept this instruction as my diet prescription.   Other diet: N/A    Special Instructions: None    · Is patient discharged on Warfarin / Coumadin?   No     Depression / Suicide Risk    As you are discharged from this [facility] facility, it is important to learn how to keep safe from harming yourself.    Recognize the warning signs:  · Abrupt changes in personality, positive or negative- including increase in energy   · Giving away possessions  · Change in eating patterns- significant weight changes-  positive or negative  · Change in sleeping patterns- unable to sleep or sleeping all the time   · Unwillingness or inability to communicate  · Depression  · Unusual sadness, discouragement and loneliness  · Talk of wanting to die  · Neglect of personal appearance   · Rebelliousness- reckless behavior  · Withdrawal from people/activities they love  · Confusion- inability to concentrate     If you or a loved one observes any of these behaviors or has concerns about self-harm, here's what you can do:  · Talk about it- your feelings and reasons for harming yourself  · Remove any means that you might use to hurt yourself (examples: pills, rope, extension cords, firearm)  · Get professional help from the community (Mental  Health, Substance Abuse, psychological counseling)  · Do not be alone:Call your Safe Contact- someone whom you trust who will be there for you.  · Call your local CRISIS HOTLINE 975-8831 or 615-312-4934  · Call your local Children's Mobile Crisis Response Team [place] (880) 954-8435 or www.to be  · Call the toll free National Suicide Prevention Hotlines   · National Suicide Prevention Lifeline 193-215-XOLX (5055)  · National Hope Line Network 800-SUICIDE (586-0833)

## 2020-10-07 NOTE — PROGRESS NOTES
Report received from MIGUEL Last RN. Assumed care, assessment complete at 2200. Pt is A & O x 4.  Pt denies having any pain at this time. Fall precautions and appropriate signs in place. Pt oriented to unit routine, call light/phone system and RN extension number provided. Pt educated regarding fall precautions. Bed alarm not in use,pt independent. Pt denies any additional needs at this time. Call light within reach.

## 2020-10-07 NOTE — PROGRESS NOTES
Pt has been given discharge paperworks.  Pt verbalized understanding of paperwork and of additions/changes to medication regimen.  Pt PIV d/c'd, tip intact.  Pt leaving via car with  to home.

## 2020-10-07 NOTE — DISCHARGE SUMMARY
Discharge Summary    CHIEF COMPLAINT ON ADMISSION  Chief Complaint   Patient presents with   • Abdominal Pain   • Flank Pain       Reason for Admission  Flank Pain; Back Pain      Admission Date  10/5/2020    CODE STATUS  Full Code    HPI & HOSPITAL COURSE  45 year old female with PMHx of multiple surgeries including cholecystectomy, appendectomy, C section, lysis of abdominal adhesions, hx of nephrostomy tube placement, s/p R nephrectomy in 2010 who presented on 10/5/2020 to [facility] ER from [facility] with lower abdominal pain and left flank tenderness associated with chills and weakness found to UA consistent for urinary tract infection. CT abdomen/pelvis done showed inflammatory stranding around the left renal pelvis and the left ureter with subtle hyperdensity in the distal left ureter likely representing tiny calculi. Labs were significant for an elevated D dimer, subsequently a CTPA done was negative for a pulmonary embolus. Of note, patient has history of multiple kidney stones in the past and recent bladder infection in august 2020.      The patient was treated for complicated UTI and monitored for passage of kidney stone. Patient described overall improvement in her pain and symptoms. She will be discharged with course of antibiotics to complete treatment and two days of PRN pain medication. Alarm signs and symptoms were discussed with patient.        Therefore, she is discharged in good and stable condition to home with close outpatient follow-up.    The patient recovered much more quickly than anticipated on admission.    Discharge Date  10/07/2020    FOLLOW UP ITEMS POST DISCHARGE  FU with PCP as needed     DISCHARGE DIAGNOSES  Principal Problem:    Complicated urinary tract infection POA: Unknown  Active Problems:    HTN (hypertension) POA: Yes      Overview: ICD-10 transition    Obesity (BMI 35.0-39.9 without comorbidity) POA: Yes  Resolved Problems:    Kidney stone POA:  Unknown      FOLLOW UP  No future appointments.  No follow-up provider specified.    MEDICATIONS ON DISCHARGE     Medication List      START taking these medications      Instructions   cephALEXin 500 MG Caps  Start taking on: October 8, 2020  Commonly known as: KEFLEX   Take 1 Cap by mouth 2 times a day for 5 days.  Dose: 500 mg     ketorolac 10 MG Tabs  Commonly known as: TORADOL   Take 1 Tab by mouth every 6 hours as needed for Severe Pain for up to 2 days.  Dose: 10 mg        CONTINUE taking these medications      Instructions   acetaminophen 325 MG Tabs  Commonly known as: TYLENOL   Take 650 mg by mouth every four hours as needed.  Dose: 650 mg        STOP taking these medications    asa/apap/caffeine 250-250-65 MG Tabs  Commonly known as: EXCEDRIN            Allergies  Allergies   Allergen Reactions   • Nkda [No Known Drug Allergy]        DIET  Orders Placed This Encounter   Procedures   • Diet Order Cardiac     Standing Status:   Standing     Number of Occurrences:   1     Order Specific Question:   Diet:     Answer:   Cardiac [6]       ACTIVITY  As tolerated.  Weight bearing as tolerated    CONSULTATIONS  None    PROCEDURES  None    LABORATORY  Lab Results   Component Value Date    SODIUM 143 10/07/2020    POTASSIUM 3.7 10/07/2020    CHLORIDE 107 10/07/2020    CO2 25 10/07/2020    GLUCOSE 103 (H) 10/07/2020    BUN 7 (L) 10/07/2020    CREATININE 0.65 10/07/2020    CREATININE 0.8 04/17/2009        Lab Results   Component Value Date    WBC 6.4 10/07/2020    HEMOGLOBIN 12.4 10/07/2020    HEMATOCRIT 36.6 (L) 10/07/2020    PLATELETCT 198 10/07/2020        Total time of the discharge process exceeds 31 minutes.

## 2020-10-11 LAB
BACTERIA BLD CULT: NORMAL
BACTERIA BLD CULT: NORMAL
SIGNIFICANT IND 70042: NORMAL
SIGNIFICANT IND 70042: NORMAL
SITE SITE: NORMAL
SITE SITE: NORMAL
SOURCE SOURCE: NORMAL
SOURCE SOURCE: NORMAL

## 2020-10-14 ENCOUNTER — OFFICE VISIT (OUTPATIENT)
Dept: MEDICAL GROUP | Facility: PHYSICIAN GROUP | Age: 45
End: 2020-10-14
Payer: COMMERCIAL

## 2020-10-14 ENCOUNTER — NURSE TRIAGE (OUTPATIENT)
Dept: HEALTH INFORMATION MANAGEMENT | Facility: OTHER | Age: 45
End: 2020-10-14

## 2020-10-14 VITALS
SYSTOLIC BLOOD PRESSURE: 160 MMHG | BODY MASS INDEX: 39 KG/M2 | OXYGEN SATURATION: 95 % | WEIGHT: 248.46 LBS | HEIGHT: 67 IN | DIASTOLIC BLOOD PRESSURE: 90 MMHG | HEART RATE: 83 BPM | TEMPERATURE: 96.7 F

## 2020-10-14 DIAGNOSIS — E78.5 DYSLIPIDEMIA: ICD-10-CM

## 2020-10-14 DIAGNOSIS — Z23 NEED FOR IMMUNIZATION AGAINST INFLUENZA: ICD-10-CM

## 2020-10-14 DIAGNOSIS — N12 PYELONEPHRITIS OF LEFT KIDNEY: ICD-10-CM

## 2020-10-14 DIAGNOSIS — N20.0 NEPHROLITHIASIS: ICD-10-CM

## 2020-10-14 DIAGNOSIS — Z12.31 ENCOUNTER FOR SCREENING MAMMOGRAM FOR MALIGNANT NEOPLASM OF BREAST: ICD-10-CM

## 2020-10-14 DIAGNOSIS — N39.0 RECURRENT UTI: ICD-10-CM

## 2020-10-14 DIAGNOSIS — I10 ESSENTIAL HYPERTENSION: ICD-10-CM

## 2020-10-14 PROCEDURE — 99214 OFFICE O/P EST MOD 30 MIN: CPT | Mod: 25 | Performed by: FAMILY MEDICINE

## 2020-10-14 PROCEDURE — 90686 IIV4 VACC NO PRSV 0.5 ML IM: CPT | Performed by: FAMILY MEDICINE

## 2020-10-14 PROCEDURE — 90471 IMMUNIZATION ADMIN: CPT | Performed by: FAMILY MEDICINE

## 2020-10-14 RX ORDER — SULFAMETHOXAZOLE AND TRIMETHOPRIM 400; 80 MG/1; MG/1
1 TABLET ORAL DAILY
Qty: 30 TAB | Refills: 5 | Status: SHIPPED | OUTPATIENT
Start: 2020-10-14 | End: 2021-04-09

## 2020-10-14 RX ORDER — METOPROLOL SUCCINATE 25 MG/1
25 TABLET, EXTENDED RELEASE ORAL DAILY
Qty: 30 TAB | Refills: 5 | Status: SHIPPED | OUTPATIENT
Start: 2020-10-14 | End: 2021-02-16 | Stop reason: SDUPTHER

## 2020-10-14 RX ORDER — NITROFURANTOIN 25; 75 MG/1; MG/1
100 CAPSULE ORAL 2 TIMES DAILY
Qty: 10 CAP | Refills: 0 | Status: ON HOLD | OUTPATIENT
Start: 2020-10-14 | End: 2020-10-31

## 2020-10-14 ASSESSMENT — FIBROSIS 4 INDEX: FIB4 SCORE: 1.19027940128723117

## 2020-10-14 NOTE — PROGRESS NOTES
Subjective:      Leslie Nazario is a 45 y.o. female who presents with Hospital Follow-up            HPI     Patient is here for follow-up after recent hospitalization at [facility] from 10/5-7/2020 for flank pain, abdominal pain, generalized weakness.  Was initially seen at urgent care and urine dipstick was consistent with UTI.  And was transferred to the ER because they could not do outpatient renal colic CT to rule out kidney stone.  At the ER her CT of the abdomen and pelvis showed inflammatory stranding around the left renal pelvis and left ureter with subtle hyperdensity in the distal left ureter likely representing a tiny calculi.  Patient has history of recurrent UTI and recurrent kidney stones.  She has had multiple interventions with stone retrieval and stenting.  She eventually had right nephrectomy in 2010 because of intractable pain from recurrent kidney stones.  Her urine culture in the hospital came back positive for E. coli which was pansensitive.  She was treated appropriately with IV antibiotics and sent home on Keflex 500 mg twice a day for 5 days which she already finished 2 days ago.  Her last UTI prior to this was in August 2020 which also grew out E. coli which was pansensitive.  She responded to outpatient antibiotic treatment.    Patient states that she probably passed the stone while she was in the hospital because she had significant pain that felt like passing a stone.  She however did not strain her urine and so she was not able to collect the stone.    She feels much better today and the flank pain is already resolved.  She still has mild lower abdominal pain which has improved compared to the more generalized pain that she had initially.  She however still feels on and off sweating.  She said she gets fluttering on the right side of the chest that lasts for a few seconds and then the sweating comes.  This has been ongoing since her UTI in August 2020.  She is wondering  "if that UTI never cleared and led to the most recent kidney infection.    She was previously on metoprolol for hypertension but she stopped taking it a while ago because she said she it made her feel lightheaded.  The last time I saw her was in 2017 and at that time she was still on the blood pressure medication.  Her blood pressure today is elevated.    She has history of dyslipidemia with her last blood work in 2017 that showed total cholesterol of 216, HDL 52,  and triglycerides of 112.  She has been managing this with her own efforts only.    She is due for flu shot today.  She is overdue for screening mammogram.    Past medical history, past surgical history, family history reviewed-no changes    Social history reviewed-no changes    Allergies reviewed-no changes    Medications reviewed-no changes        ROS     As per HPI, the rest are negative.       Objective:     /90 (BP Location: Left arm, Patient Position: Sitting, BP Cuff Size: Adult)   Pulse 83   Temp 35.9 °C (96.7 °F) (Temporal)   Ht 1.702 m (5' 7\")   Wt 112.7 kg (248 lb 7.3 oz)   SpO2 95%   BMI 38.91 kg/m²      Physical Exam     Examined alert, awake, oriented, not in distress    Neck-supple, no lymphadenopathy, no thyromegaly  Lungs-clear to auscultation, no rales, no wheezes  Heart-regular rate and rhythm, no murmur  Abdomen-good bowel sounds, soft, slightly tender suprapubic area, no hepatosplenomegaly, no masses, no CVA tenderness  Extremities-no edema, clubbing, cyanosis       I reviewed hospital records.     Assessment/Plan:        1. Pyelonephritis of left kidney  Her CT scan of the abdomen and pelvis showed findings consistent with pyelonephritis of the left kidney.  She already had right nephrectomy in 2010 due to intractable pain from recurrent kidney stones.  Fortunately her kidney function has remained normal all throughout.  She was treated appropriately with antibiotics and was sent home on p.o. antibiotics for 5 days. "  She has ongoing sweating and lower abdominal pain/suprapubic pain which could mean ongoing infection.  I will give her 5 more days of antibiotics this time with nitrofurantoin.  Once she is done with this course of antibiotics I will put her on daily dose of single strength Bactrim 1 tablet daily for UTI prophylaxis.  She may have to take this for 1 to 3 months or even longer.  - Lipid Profile; Future  - CBC WITH DIFFERENTIAL; Future  - Comp Metabolic Panel; Future  - nitrofurantoin (MACROBID) 100 MG Cap; Take 1 Cap by mouth 2 times a day.  Dispense: 10 Cap; Refill: 0  - TSH; Future    2. Recurrent UTI  We will put her on daily dose of single strength Bactrim for the next 1 to 3 months or even longer to prevent recurrent UTI.  - Lipid Profile; Future  - CBC WITH DIFFERENTIAL; Future  - Comp Metabolic Panel; Future  - sulfamethoxazole-trimethoprim (BACTRIM) 400-80 MG Tab; Take 1 Tab by mouth every day.  Dispense: 30 Tab; Refill: 5  - TSH; Future    3. Nephrolithiasis  Her most recent CT scan of the abdomen and pelvis showed tiny stone in the ureter which she thinks she may have passed in the hospital.  Advised proper hydration to avoid recurrence of kidney stones.  - Lipid Profile; Future  - CBC WITH DIFFERENTIAL; Future  - Comp Metabolic Panel; Future  - TSH; Future    4. Essential hypertension  Her blood pressure is elevated.  She has not been taking her blood pressure medication for a few years now.  She has been having right sided chest palpitations and sweating.  I will put her back on metoprolol XL 25 mg 1 tablet daily.  Advised to monitor blood pressure daily and keep a record and bring on return.  I will follow-up in a month.  If she starts feeling dizzy and lightheaded advised to check the blood pressure when that happens and keep a record.  - Lipid Profile; Future  - CBC WITH DIFFERENTIAL; Future  - Comp Metabolic Panel; Future  - metoprolol SR (TOPROL XL) 25 MG TABLET SR 24 HR; Take 1 Tab by mouth every  day.  Dispense: 30 Tab; Refill: 5  - TSH; Future    5. Dyslipidemia  We will do updated lipid panel.  - Lipid Profile; Future  - CBC WITH DIFFERENTIAL; Future  - Comp Metabolic Panel; Future  - TSH; Future    6. Need for immunization against influenza  Flu shot was given.  - Influenza Vaccine Quad Injection (PF)    7. Encounter for screening mammogram for malignant neoplasm of breast  She is overdue for screening mammogram and this was ordered.  - MA-SCREENING MAMMO BILAT W/CAD; Future    I will see her for follow-up in 1 month.      Please note that this dictation was created using voice recognition software. I have worked with consultants from the vendor as well as technical experts from [facility] to optimize the interface. I have made every reasonable attempt to correct obvious errors, but I expect that there are errors of grammar and possibly content I did not discover before finalizing the note.

## 2020-10-14 NOTE — TELEPHONE ENCOUNTER
Just discharged (10/7) from hospital from [facility], 2 kidney stones & bladder infection, still does not feel good, tested for covid (10/6) & it is negative.      No travel & no known exposure.      No symptoms that cannot be explained by an alternative diagnosis.

## 2020-10-23 ENCOUNTER — HOSPITAL ENCOUNTER (OUTPATIENT)
Dept: RADIOLOGY | Facility: MEDICAL CENTER | Age: 45
End: 2020-10-23
Attending: FAMILY MEDICINE
Payer: COMMERCIAL

## 2020-10-23 DIAGNOSIS — Z12.31 VISIT FOR SCREENING MAMMOGRAM: ICD-10-CM

## 2020-10-23 PROCEDURE — 77067 SCR MAMMO BI INCL CAD: CPT

## 2020-10-29 ENCOUNTER — APPOINTMENT (OUTPATIENT)
Dept: RADIOLOGY | Facility: MEDICAL CENTER | Age: 45
End: 2020-10-29
Attending: EMERGENCY MEDICINE
Payer: COMMERCIAL

## 2020-10-29 ENCOUNTER — HOSPITAL ENCOUNTER (OUTPATIENT)
Facility: MEDICAL CENTER | Age: 45
End: 2020-10-31
Attending: EMERGENCY MEDICINE | Admitting: INTERNAL MEDICINE
Payer: COMMERCIAL

## 2020-10-29 DIAGNOSIS — G45.9 TIA (TRANSIENT ISCHEMIC ATTACK): ICD-10-CM

## 2020-10-29 DIAGNOSIS — I16.9 HYPERTENSIVE CRISIS: ICD-10-CM

## 2020-10-29 LAB
AMPHET UR QL SCN: NEGATIVE
ANION GAP SERPL CALC-SCNC: 13 MMOL/L (ref 7–16)
BARBITURATES UR QL SCN: NEGATIVE
BASOPHILS # BLD AUTO: 0.8 % (ref 0–1.8)
BASOPHILS # BLD: 0.06 K/UL (ref 0–0.12)
BENZODIAZ UR QL SCN: NEGATIVE
BUN SERPL-MCNC: 8 MG/DL (ref 8–22)
BZE UR QL SCN: NEGATIVE
CALCIUM SERPL-MCNC: 9.4 MG/DL (ref 8.5–10.5)
CANNABINOIDS UR QL SCN: NEGATIVE
CHLORIDE SERPL-SCNC: 103 MMOL/L (ref 96–112)
CO2 SERPL-SCNC: 22 MMOL/L (ref 20–33)
COVID ORDER STATUS COVID19: NORMAL
CREAT SERPL-MCNC: 0.72 MG/DL (ref 0.5–1.4)
EKG IMPRESSION: NORMAL
EOSINOPHIL # BLD AUTO: 0.09 K/UL (ref 0–0.51)
EOSINOPHIL NFR BLD: 1.2 % (ref 0–6.9)
ERYTHROCYTE [DISTWIDTH] IN BLOOD BY AUTOMATED COUNT: 42.5 FL (ref 35.9–50)
EST. AVERAGE GLUCOSE BLD GHB EST-MCNC: 100 MG/DL
GLUCOSE SERPL-MCNC: 93 MG/DL (ref 65–99)
HBA1C MFR BLD: 5.1 % (ref 0–5.6)
HCT VFR BLD AUTO: 41.9 % (ref 37–47)
HGB BLD-MCNC: 14.2 G/DL (ref 12–16)
IMM GRANULOCYTES # BLD AUTO: 0.04 K/UL (ref 0–0.11)
IMM GRANULOCYTES NFR BLD AUTO: 0.5 % (ref 0–0.9)
LYMPHOCYTES # BLD AUTO: 2.4 K/UL (ref 1–4.8)
LYMPHOCYTES NFR BLD: 31.8 % (ref 22–41)
MAGNESIUM SERPL-MCNC: 2.1 MG/DL (ref 1.5–2.5)
MCH RBC QN AUTO: 30 PG (ref 27–33)
MCHC RBC AUTO-ENTMCNC: 33.9 G/DL (ref 33.6–35)
MCV RBC AUTO: 88.4 FL (ref 81.4–97.8)
METHADONE UR QL SCN: NEGATIVE
MONOCYTES # BLD AUTO: 0.48 K/UL (ref 0–0.85)
MONOCYTES NFR BLD AUTO: 6.4 % (ref 0–13.4)
NEUTROPHILS # BLD AUTO: 4.47 K/UL (ref 2–7.15)
NEUTROPHILS NFR BLD: 59.3 % (ref 44–72)
NRBC # BLD AUTO: 0 K/UL
NRBC BLD-RTO: 0 /100 WBC
OPIATES UR QL SCN: NEGATIVE
OXYCODONE UR QL SCN: NEGATIVE
PCP UR QL SCN: NEGATIVE
PLATELET # BLD AUTO: 271 K/UL (ref 164–446)
PMV BLD AUTO: 10.2 FL (ref 9–12.9)
POTASSIUM SERPL-SCNC: 4.2 MMOL/L (ref 3.6–5.5)
PROPOXYPH UR QL SCN: NEGATIVE
RBC # BLD AUTO: 4.74 M/UL (ref 4.2–5.4)
SARS-COV-2 RNA RESP QL NAA+PROBE: NOTDETECTED
SODIUM SERPL-SCNC: 138 MMOL/L (ref 135–145)
SPECIMEN SOURCE: NORMAL
TROPONIN T SERPL-MCNC: <6 NG/L (ref 6–19)
TSH SERPL DL<=0.005 MIU/L-ACNC: 1.77 UIU/ML (ref 0.38–5.33)
WBC # BLD AUTO: 7.5 K/UL (ref 4.8–10.8)

## 2020-10-29 PROCEDURE — 93005 ELECTROCARDIOGRAM TRACING: CPT | Performed by: EMERGENCY MEDICINE

## 2020-10-29 PROCEDURE — 700117 HCHG RX CONTRAST REV CODE 255: Performed by: EMERGENCY MEDICINE

## 2020-10-29 PROCEDURE — 99285 EMERGENCY DEPT VISIT HI MDM: CPT

## 2020-10-29 PROCEDURE — 70496 CT ANGIOGRAPHY HEAD: CPT

## 2020-10-29 PROCEDURE — 700102 HCHG RX REV CODE 250 W/ 637 OVERRIDE(OP): Performed by: EMERGENCY MEDICINE

## 2020-10-29 PROCEDURE — A9270 NON-COVERED ITEM OR SERVICE: HCPCS | Performed by: STUDENT IN AN ORGANIZED HEALTH CARE EDUCATION/TRAINING PROGRAM

## 2020-10-29 PROCEDURE — 700102 HCHG RX REV CODE 250 W/ 637 OVERRIDE(OP): Performed by: STUDENT IN AN ORGANIZED HEALTH CARE EDUCATION/TRAINING PROGRAM

## 2020-10-29 PROCEDURE — 84484 ASSAY OF TROPONIN QUANT: CPT

## 2020-10-29 PROCEDURE — A9270 NON-COVERED ITEM OR SERVICE: HCPCS | Performed by: EMERGENCY MEDICINE

## 2020-10-29 PROCEDURE — 70498 CT ANGIOGRAPHY NECK: CPT

## 2020-10-29 PROCEDURE — 80307 DRUG TEST PRSMV CHEM ANLYZR: CPT

## 2020-10-29 PROCEDURE — 84443 ASSAY THYROID STIM HORMONE: CPT

## 2020-10-29 PROCEDURE — U0003 INFECTIOUS AGENT DETECTION BY NUCLEIC ACID (DNA OR RNA); SEVERE ACUTE RESPIRATORY SYNDROME CORONAVIRUS 2 (SARS-COV-2) (CORONAVIRUS DISEASE [COVID-19]), AMPLIFIED PROBE TECHNIQUE, MAKING USE OF HIGH THROUGHPUT TECHNOLOGIES AS DESCRIBED BY CMS-2020-01-R: HCPCS

## 2020-10-29 PROCEDURE — 80048 BASIC METABOLIC PNL TOTAL CA: CPT

## 2020-10-29 PROCEDURE — C9803 HOPD COVID-19 SPEC COLLECT: HCPCS | Performed by: EMERGENCY MEDICINE

## 2020-10-29 PROCEDURE — 85025 COMPLETE CBC W/AUTO DIFF WBC: CPT

## 2020-10-29 PROCEDURE — 83735 ASSAY OF MAGNESIUM: CPT

## 2020-10-29 PROCEDURE — G0378 HOSPITAL OBSERVATION PER HR: HCPCS

## 2020-10-29 PROCEDURE — 83036 HEMOGLOBIN GLYCOSYLATED A1C: CPT

## 2020-10-29 PROCEDURE — 93005 ELECTROCARDIOGRAM TRACING: CPT

## 2020-10-29 RX ORDER — ATORVASTATIN CALCIUM 80 MG/1
80 TABLET, FILM COATED ORAL EVERY EVENING
Status: DISCONTINUED | OUTPATIENT
Start: 2020-10-29 | End: 2020-10-31 | Stop reason: HOSPADM

## 2020-10-29 RX ORDER — POLYETHYLENE GLYCOL 3350 17 G/17G
1 POWDER, FOR SOLUTION ORAL
Status: DISCONTINUED | OUTPATIENT
Start: 2020-10-29 | End: 2020-10-31 | Stop reason: HOSPADM

## 2020-10-29 RX ORDER — AMOXICILLIN 250 MG
2 CAPSULE ORAL 2 TIMES DAILY
Status: DISCONTINUED | OUTPATIENT
Start: 2020-10-29 | End: 2020-10-31 | Stop reason: HOSPADM

## 2020-10-29 RX ORDER — ACETAMINOPHEN 500 MG
1000 TABLET ORAL ONCE
Status: COMPLETED | OUTPATIENT
Start: 2020-10-29 | End: 2020-10-29

## 2020-10-29 RX ORDER — ACETAMINOPHEN 325 MG/1
650 TABLET ORAL EVERY 6 HOURS PRN
Status: DISCONTINUED | OUTPATIENT
Start: 2020-10-29 | End: 2020-10-31 | Stop reason: HOSPADM

## 2020-10-29 RX ORDER — BISACODYL 10 MG
10 SUPPOSITORY, RECTAL RECTAL
Status: DISCONTINUED | OUTPATIENT
Start: 2020-10-29 | End: 2020-10-31 | Stop reason: HOSPADM

## 2020-10-29 RX ADMIN — ATORVASTATIN CALCIUM 80 MG: 80 TABLET, FILM COATED ORAL at 21:22

## 2020-10-29 RX ADMIN — ACETAMINOPHEN 1000 MG: 500 TABLET ORAL at 18:42

## 2020-10-29 RX ADMIN — IOHEXOL 100 ML: 350 INJECTION, SOLUTION INTRAVENOUS at 16:08

## 2020-10-29 ASSESSMENT — COGNITIVE AND FUNCTIONAL STATUS - GENERAL
SUGGESTED CMS G CODE MODIFIER MOBILITY: CH
MOBILITY SCORE: 24
SUGGESTED CMS G CODE MODIFIER DAILY ACTIVITY: CH
DAILY ACTIVITIY SCORE: 24

## 2020-10-29 ASSESSMENT — LIFESTYLE VARIABLES
TOTAL SCORE: 0
EVER FELT BAD OR GUILTY ABOUT YOUR DRINKING: NO
AVERAGE NUMBER OF DAYS PER WEEK YOU HAVE A DRINK CONTAINING ALCOHOL: 0
ALCOHOL_USE: NO
ON A TYPICAL DAY WHEN YOU DRINK ALCOHOL HOW MANY DRINKS DO YOU HAVE: 0
TOTAL SCORE: 0
HAVE YOU EVER FELT YOU SHOULD CUT DOWN ON YOUR DRINKING: NO
HAVE PEOPLE ANNOYED YOU BY CRITICIZING YOUR DRINKING: NO
TOTAL SCORE: 0
EVER HAD A DRINK FIRST THING IN THE MORNING TO STEADY YOUR NERVES TO GET RID OF A HANGOVER: NO
DOES PATIENT WANT TO STOP DRINKING: NO
HOW MANY TIMES IN THE PAST YEAR HAVE YOU HAD 5 OR MORE DRINKS IN A DAY: 0
CONSUMPTION TOTAL: NEGATIVE

## 2020-10-29 ASSESSMENT — PATIENT HEALTH QUESTIONNAIRE - PHQ9
2. FEELING DOWN, DEPRESSED, IRRITABLE, OR HOPELESS: NOT AT ALL
1. LITTLE INTEREST OR PLEASURE IN DOING THINGS: NOT AT ALL
SUM OF ALL RESPONSES TO PHQ9 QUESTIONS 1 AND 2: 0

## 2020-10-29 ASSESSMENT — PAIN DESCRIPTION - PAIN TYPE: TYPE: ACUTE PAIN

## 2020-10-29 ASSESSMENT — FIBROSIS 4 INDEX
FIB4 SCORE: 0.87
FIB4 SCORE: 1.19027940128723117

## 2020-10-29 NOTE — ED PROVIDER NOTES
"ED Provider Note    Scribed for Сергей Sow M.D. by Bradley Medina. 10/29/2020  2:02 PM    Primary care provider: Love Ruiz M.D.  Means of arrival: Walk In  History obtained from: Patient  History limited by: None    CHIEF COMPLAINT  Chief Complaint   Patient presents with   • Sent by MD   • Hypertension   • Headache       HPI  Leslie Nazario is a 45 y.o. female with a history of a prior stroke who presents to the Emergency Department for evaluation of difficulty speaking. Patient states that she was at work earlier this morning around 11 AM and while talking with a co-worker noticed that she was having some slurred speech. She contacted her PCP this morning stating she has been having confusion which is causing her difficulty speaking and also notes that her blood pressure readings are elevated at 199/135 and 193/121. Given these symptoms her PCP recommended she come here immediately. Upon arrival she further describes her difficulty speaking as \"I could visualize the words I wanted to say but was having trouble saying them, and id say something else\". She also reports having some mild tingling in the base of her neck. Upon arrival her blood pressure is elevated at 207/159. She states that she is complaint her metoprolol SR 25 mg QD and is also taking macrobid and bactrim for a kidney infection, and only has her left kidney. She otherwise denies having any numbness, weakness, facial droop, headache, abdominal pain, nausea, vomiting, chest pain, or shortness of breath.    REVIEW OF SYSTEMS  Pertinent positives include slurred speech, dysarthria, tingling. Pertinent negatives include no numbness, weakness, facial droop, headache, abdominal pain, nausea, vomiting, chest pain, shortness of breath.  All other systems reviewed and negative.    PAST MEDICAL HISTORY   has a past medical history of Depression, HTN, Pain (01-24-14), Pain, Renal disorder, and S/p nephrectomy (2010).    SURGICAL HISTORY   has a " past surgical history that includes lysis adhesions general (5/06); IR-URETERAL STENT ANTEGRADE; ureteral reimplantation (4/23/08); stent placement (4/23/08); ureteroscopy; cystoscopy stent placement (8/11/2009); ureteroscopy (8/11/2009); stone retrieval (8/11/2009); stent removal (8/11/2009); nephrectomy laparoscopic (2010); roe by laparoscopy (12/11/2013); abdominal hysterectomy total (2005); cholecystectomy (2013); appendectomy (1991); laparoscopy (1/27/2014); lysis adhesions general (1/27/2014); nephrectomy (2010); tonsillectomy (2004); laparotomy (2005); laparotomy (2009); other ([id number]); hip arthroscopy (12/18/2014); femoral neck osteoplasty (12/18/2014); acetabular osteoplasty (12/18/2014); and synovectomy (12/18/2014).    SOCIAL HISTORY  Social History     Tobacco Use   • Smoking status: Never Smoker   • Smokeless tobacco: Never Used   Substance Use Topics   • Alcohol use: Yes     Alcohol/week: 0.0 oz     Comment: 2 per month   • Drug use: No      Social History     Substance and Sexual Activity   Drug Use No       FAMILY HISTORY  Family History   Problem Relation Age of Onset   • Hypertension Mother    • Hypertension Father    • Hypertension Brother    • Genitourinary () Problems Brother         kidney stones       CURRENT MEDICATIONS  Home Medications     Reviewed by Courtney Dorman (Pharmacy Tech) on 10/29/20 at 1435  Med List Status: Complete   Medication Last Dose Status   metoprolol SR (TOPROL XL) 25 MG TABLET SR 24 HR 10/28/2020 Active   nitrofurantoin (MACROBID) 100 MG Cap 10/19/2020 Active   sulfamethoxazole-trimethoprim (BACTRIM) 400-80 MG Tab 10/28/2020 Active                ALLERGIES  No Known Allergies    PHYSICAL EXAM  VITAL SIGNS: BP (!) 207/159   Pulse 97   Temp 36.9 °C (98.4 °F) (Temporal)   Resp 20   Wt 113.6 kg (250 lb 7.1 oz)   SpO2 96%   BMI 39.22 kg/m²     Vital signs reviewed.  Constitutional:  Appears well-developed and well-nourished.  Head: Normocephalic.    Mouth/Throat: Oropharynx is clear and moist.   Eyes: EOM are normal. Pupils are equal, round, and reactive to light.  Neck: Normal range of motion. Neck supple.  Cardiovascular: Normal rate, regular rhythm and normal heart sounds.    Pulmonary/Chest: Effort normal and breath sounds normal. No wheezes.   Abdominal: Soft. There is no tenderness. There is no rebound and no guarding.   Musculoskeletal: Exhibits no edema.   Lymphadenopathy: No cervical adenopathy.   Neurological: Patient is alert and oriented to person, place, and time. CNs II - XII intact. DTRs intact. Normal finger to nose, normal heel shin testing, Normal sensation and strength.   Skin: Skin is warm and dry.   Psychiatric: Patient has a normal mood and affect. Behavior is normal.     LABS  Results for orders placed or performed during the hospital encounter of 10/29/20   CBC WITH DIFFERENTIAL   Result Value Ref Range    WBC 7.5 4.8 - 10.8 K/uL    RBC 4.74 4.20 - 5.40 M/uL    Hemoglobin 14.2 12.0 - 16.0 g/dL    Hematocrit 41.9 37.0 - 47.0 %    MCV 88.4 81.4 - 97.8 fL    MCH 30.0 27.0 - 33.0 pg    MCHC 33.9 33.6 - 35.0 g/dL    RDW 42.5 35.9 - 50.0 fL    Platelet Count 271 164 - 446 K/uL    MPV 10.2 9.0 - 12.9 fL    Neutrophils-Polys 59.30 44.00 - 72.00 %    Lymphocytes 31.80 22.00 - 41.00 %    Monocytes 6.40 0.00 - 13.40 %    Eosinophils 1.20 0.00 - 6.90 %    Basophils 0.80 0.00 - 1.80 %    Immature Granulocytes 0.50 0.00 - 0.90 %    Nucleated RBC 0.00 /100 WBC    Neutrophils (Absolute) 4.47 2.00 - 7.15 K/uL    Lymphs (Absolute) 2.40 1.00 - 4.80 K/uL    Monos (Absolute) 0.48 0.00 - 0.85 K/uL    Eos (Absolute) 0.09 0.00 - 0.51 K/uL    Baso (Absolute) 0.06 0.00 - 0.12 K/uL    Immature Granulocytes (abs) 0.04 0.00 - 0.11 K/uL    NRBC (Absolute) 0.00 K/uL   BASIC METABOLIC PANEL   Result Value Ref Range    Sodium 138 135 - 145 mmol/L    Potassium 4.2 3.6 - 5.5 mmol/L    Chloride 103 96 - 112 mmol/L    Co2 22 20 - 33 mmol/L    Glucose 93 65 - 99 mg/dL     Bun 8 8 - 22 mg/dL    Creatinine 0.72 0.50 - 1.40 mg/dL    Calcium 9.4 8.5 - 10.5 mg/dL    Anion Gap 13.0 7.0 - 16.0   TROPONIN   Result Value Ref Range    Troponin T <6 6 - 19 ng/L   ESTIMATED GFR   Result Value Ref Range    GFR If African American >60 >60 mL/min/1.73 m 2    GFR If Non African American >60 >60 mL/min/1.73 m 2   COVID/SARS CoV-2 PCR    Specimen: Nasopharyngeal; Respirate   Result Value Ref Range    COVID Order Status Received    SARS-CoV-2, PCR (In-House)   Result Value Ref Range    SARS-CoV-2 Source NP Swab    EKG   Result Value Ref Range    Report       [facility]    Test Date:  2020-10-29  Pt Name:    MATTHIEU OVIEDO              Department: ER  MRN:        [id number]                      Room:  Gender:     Female                       Technician: [id number]  :        [id number]                   Requested By:ER TRIAGE PROTOCOL  Order #:    [id number]                    Reading MD:    Measurements  Intervals                                Axis  Rate:       90                           P:          36  NE:         150                          QRS:        -4  QRSD:       92                           T:          25  QT:         372  QTc:        455    Interpretive Statements  Sinus rhythm  Baseline wander in lead(s) V5  Compared to ECG 12/15/2014 10:28:35  No significant changes       All labs reviewed by me.    EKG  12 Lead EKG interpreted by me to show sinus rhythm at 90. Normal P waves. Normal QRS. Normal ST segments. Normal T waves. Normal EKG.    RADIOLOGY  CT-CTA NECK WITH & W/O-POST PROCESSING   Final Result      1. No evidence of flow-limiting stenosis in the cervical carotid or cervical vertebral arteries.      CT-CTA HEAD WITH & W/O-POST PROCESS   Final Result      1.  CT angiogram of the [place] within normal limits.      2.  CT head without and with contrast within normal limits        The radiologist's interpretation of all radiological studies have  been reviewed by me.    COURSE & MEDICAL DECISION MAKING  Pertinent Labs & Imaging studies reviewed. (See chart for details) The patient's [facility] and past medical records were reviewed    Review of patients past medical records shows the patient has hypertension and has suffered a prior TIA. She has one kidney and is currently on antibiotics for a kidney infection. She was admitted on 10/05/20 for a complicated UTI and monitored for passage of kidney stone. Her symptoms improved and she was discharged.    2:15 PM - Patient seen and examined at bedside. Ordered CT-CTA neck w/ and w/o, CT-CTA Head w/ and w/o, CBC with differential, BMP, Troponin, EKG to evaluate her symptoms. The differential diagnoses include but are not limited to: TIA, cerebral hemorrhage, hypertensive emergency. Discussed with the patient that I am ordering for labs, imaging and and EKG to further evaluate, and they will be informed of the results when they return. They understand and agree to the plan of care.    3:18 PM - Ordered for COVID PCR    4:32 - Patient reassessed who is resting in bed with stable vital signs and improved blood pressure. Discussed the results of her workup with her and that I would like to hospitalize her for further observation and a TIA workup. Answered any questions or concerns the patient had. She understands and agrees.    4:33 PM - Paged Hospitalist    4:42 PM - I spoke with Dr. Nickerson, Hospitalist, who requests that the patients COVID test returns prior to hospitalizing. Will plan to further discuss the patients case once the patients COVID test returns.    CRITICAL CARE  The very real possibilty of a deterioration of this patient's condition required the highest level of my preparedness for sudden, emergent intervention.  I provided critical care services, which included medication orders, frequent reevaluations of the patient's condition and response to treatment, ordering and reviewing test results, and  discussing the case with various consultants.  The critical care time associated with the care of the patient was 35 minutes. Review chart for interventions. This time is exclusive of any other billable procedures.     DISPOSITION:  Patient will be hospitalized by th [facility], Hospitalist in critical condition.    FINAL IMPRESSION  1. TIA (transient ischemic attack)    2. Hypertensive crisis    3.  Hypertensive emergency     The critical care time associated with the care of the patient was 35 minutes. Review chart for interventions. This time is exclusive of any other billable procedures.      IBradley (Gerber), am scribing for, and in the presence of, Сергей Sow M.D..    Electronically signed by: Bradley Medina (Gerber), 10/29/2020    Сергей CONWAY M.D. personally performed the services described in this documentation, as scribed by Bradley Medina in my presence, and it is both accurate and complete. C.    The note accurately reflects work and decisions made by me.  Сергей Sow M.D.  10/29/2020  5:41 PM

## 2020-10-29 NOTE — ED TRIAGE NOTES
.  Chief Complaint   Patient presents with   • Sent by MD   • Hypertension   • Headache     Ambulated to triage EKG complete on arrival.   Pt was speaking with co worker apx 11 am when she has slurred speech lasting a few minutes. Has resolved. Pt called pcp and advised to come to ed. Pt curremtly on medication for bp. Has only 1 kidney and currently on abx for kidney infection. Pt with no neuro deficits. charge rn notified of pt.   Mask applied to patient prior to triage. This RN in ppe prior to encounter. Pt denies recent travel or contact with anyone tested positive for covid 19.

## 2020-10-29 NOTE — ED NOTES
Pharmacy Medication Reconciliation    Med rec updated and complete per pt at bedside  Allergies have been verified  Patient home pharmacy:[facility]      Patient reports that she is on a cotinuous 3 month course of Bactrim that was started on 10/14/2020

## 2020-10-30 ENCOUNTER — APPOINTMENT (OUTPATIENT)
Dept: CARDIOLOGY | Facility: MEDICAL CENTER | Age: 45
End: 2020-10-30
Attending: STUDENT IN AN ORGANIZED HEALTH CARE EDUCATION/TRAINING PROGRAM
Payer: COMMERCIAL

## 2020-10-30 ENCOUNTER — APPOINTMENT (OUTPATIENT)
Dept: RADIOLOGY | Facility: MEDICAL CENTER | Age: 45
End: 2020-10-30
Attending: STUDENT IN AN ORGANIZED HEALTH CARE EDUCATION/TRAINING PROGRAM
Payer: COMMERCIAL

## 2020-10-30 PROBLEM — N39.0 RECURRENT UTI: Status: ACTIVE | Noted: 2020-10-30

## 2020-10-30 PROBLEM — G45.9 TIA (TRANSIENT ISCHEMIC ATTACK): Status: ACTIVE | Noted: 2020-10-30

## 2020-10-30 PROBLEM — I16.0 HYPERTENSIVE URGENCY: Status: ACTIVE | Noted: 2020-10-30

## 2020-10-30 LAB
ALBUMIN SERPL BCP-MCNC: 3.6 G/DL (ref 3.2–4.9)
ALBUMIN/GLOB SERPL: 1.5 G/DL
ALP SERPL-CCNC: 72 U/L (ref 30–99)
ALT SERPL-CCNC: 17 U/L (ref 2–50)
ANION GAP SERPL CALC-SCNC: 11 MMOL/L (ref 7–16)
APTT PPP: 27.1 SEC (ref 24.7–36)
AST SERPL-CCNC: 11 U/L (ref 12–45)
B-HCG SERPL-ACNC: 2.8 MIU/ML (ref 0–5)
BASOPHILS # BLD AUTO: 1 % (ref 0–1.8)
BASOPHILS # BLD: 0.07 K/UL (ref 0–0.12)
BILIRUB SERPL-MCNC: 0.3 MG/DL (ref 0.1–1.5)
BUN SERPL-MCNC: 11 MG/DL (ref 8–22)
CALCIUM SERPL-MCNC: 9.3 MG/DL (ref 8.5–10.5)
CHLORIDE SERPL-SCNC: 104 MMOL/L (ref 96–112)
CHOLEST SERPL-MCNC: 218 MG/DL (ref 100–199)
CO2 SERPL-SCNC: 25 MMOL/L (ref 20–33)
CREAT SERPL-MCNC: 0.68 MG/DL (ref 0.5–1.4)
CRP SERPL HS-MCNC: 0.68 MG/DL (ref 0–0.75)
EOSINOPHIL # BLD AUTO: 0.12 K/UL (ref 0–0.51)
EOSINOPHIL NFR BLD: 1.7 % (ref 0–6.9)
ERYTHROCYTE [DISTWIDTH] IN BLOOD BY AUTOMATED COUNT: 43.3 FL (ref 35.9–50)
ERYTHROCYTE [SEDIMENTATION RATE] IN BLOOD BY WESTERGREN METHOD: 14 MM/HOUR (ref 0–20)
GLOBULIN SER CALC-MCNC: 2.4 G/DL (ref 1.9–3.5)
GLUCOSE SERPL-MCNC: 109 MG/DL (ref 65–99)
HCT VFR BLD AUTO: 39.2 % (ref 37–47)
HDLC SERPL-MCNC: 35 MG/DL
HGB BLD-MCNC: 13 G/DL (ref 12–16)
IMM GRANULOCYTES # BLD AUTO: 0.04 K/UL (ref 0–0.11)
IMM GRANULOCYTES NFR BLD AUTO: 0.6 % (ref 0–0.9)
INR PPP: 0.92 (ref 0.87–1.13)
LDLC SERPL CALC-MCNC: 129 MG/DL
LV EJECT FRACT  99904: 65
LV EJECT FRACT MOD 2C 99903: 55.55
LV EJECT FRACT MOD 4C 99902: 65.31
LV EJECT FRACT MOD BP 99901: 61.8
LYMPHOCYTES # BLD AUTO: 2.98 K/UL (ref 1–4.8)
LYMPHOCYTES NFR BLD: 42.8 % (ref 22–41)
MCH RBC QN AUTO: 30 PG (ref 27–33)
MCHC RBC AUTO-ENTMCNC: 33.2 G/DL (ref 33.6–35)
MCV RBC AUTO: 90.5 FL (ref 81.4–97.8)
MONOCYTES # BLD AUTO: 0.57 K/UL (ref 0–0.85)
MONOCYTES NFR BLD AUTO: 8.2 % (ref 0–13.4)
NEUTROPHILS # BLD AUTO: 3.18 K/UL (ref 2–7.15)
NEUTROPHILS NFR BLD: 45.7 % (ref 44–72)
NRBC # BLD AUTO: 0 K/UL
NRBC BLD-RTO: 0 /100 WBC
PLATELET # BLD AUTO: 221 K/UL (ref 164–446)
PMV BLD AUTO: 9.8 FL (ref 9–12.9)
POTASSIUM SERPL-SCNC: 3.4 MMOL/L (ref 3.6–5.5)
POTASSIUM SERPL-SCNC: 3.6 MMOL/L (ref 3.6–5.5)
PROT SERPL-MCNC: 6 G/DL (ref 6–8.2)
PROTHROMBIN TIME: 12.6 SEC (ref 12–14.6)
RBC # BLD AUTO: 4.33 M/UL (ref 4.2–5.4)
SODIUM SERPL-SCNC: 140 MMOL/L (ref 135–145)
TRIGL SERPL-MCNC: 270 MG/DL (ref 0–149)
WBC # BLD AUTO: 7 K/UL (ref 4.8–10.8)

## 2020-10-30 PROCEDURE — 97535 SELF CARE MNGMENT TRAINING: CPT

## 2020-10-30 PROCEDURE — 93306 TTE W/DOPPLER COMPLETE: CPT

## 2020-10-30 PROCEDURE — 85652 RBC SED RATE AUTOMATED: CPT

## 2020-10-30 PROCEDURE — 36415 COLL VENOUS BLD VENIPUNCTURE: CPT

## 2020-10-30 PROCEDURE — 99220 PR INITIAL OBSERVATION CARE,LEVL III: CPT | Mod: GC | Performed by: INTERNAL MEDICINE

## 2020-10-30 PROCEDURE — A9270 NON-COVERED ITEM OR SERVICE: HCPCS | Performed by: STUDENT IN AN ORGANIZED HEALTH CARE EDUCATION/TRAINING PROGRAM

## 2020-10-30 PROCEDURE — 700102 HCHG RX REV CODE 250 W/ 637 OVERRIDE(OP): Performed by: STUDENT IN AN ORGANIZED HEALTH CARE EDUCATION/TRAINING PROGRAM

## 2020-10-30 PROCEDURE — 85610 PROTHROMBIN TIME: CPT

## 2020-10-30 PROCEDURE — 84132 ASSAY OF SERUM POTASSIUM: CPT

## 2020-10-30 PROCEDURE — 93306 TTE W/DOPPLER COMPLETE: CPT | Mod: 26 | Performed by: INTERNAL MEDICINE

## 2020-10-30 PROCEDURE — 86140 C-REACTIVE PROTEIN: CPT

## 2020-10-30 PROCEDURE — 85730 THROMBOPLASTIN TIME PARTIAL: CPT

## 2020-10-30 PROCEDURE — 82088 ASSAY OF ALDOSTERONE: CPT

## 2020-10-30 PROCEDURE — 84702 CHORIONIC GONADOTROPIN TEST: CPT

## 2020-10-30 PROCEDURE — 97161 PT EVAL LOW COMPLEX 20 MIN: CPT

## 2020-10-30 PROCEDURE — 85025 COMPLETE CBC W/AUTO DIFF WBC: CPT

## 2020-10-30 PROCEDURE — 80061 LIPID PANEL: CPT

## 2020-10-30 PROCEDURE — 70551 MRI BRAIN STEM W/O DYE: CPT

## 2020-10-30 PROCEDURE — G0378 HOSPITAL OBSERVATION PER HR: HCPCS

## 2020-10-30 PROCEDURE — 84244 ASSAY OF RENIN: CPT

## 2020-10-30 PROCEDURE — 80053 COMPREHEN METABOLIC PANEL: CPT

## 2020-10-30 RX ORDER — SPIRONOLACTONE 25 MG/1
25 TABLET ORAL DAILY
Qty: 30 TAB | Refills: 3 | Status: CANCELLED | OUTPATIENT
Start: 2020-10-30

## 2020-10-30 RX ORDER — METOPROLOL SUCCINATE 25 MG/1
25 TABLET, EXTENDED RELEASE ORAL EVERY EVENING
Status: DISCONTINUED | OUTPATIENT
Start: 2020-10-30 | End: 2020-10-31 | Stop reason: HOSPADM

## 2020-10-30 RX ORDER — ASPIRIN 81 MG/1
81 TABLET, CHEWABLE ORAL DAILY
Status: DISCONTINUED | OUTPATIENT
Start: 2020-10-30 | End: 2020-10-31 | Stop reason: HOSPADM

## 2020-10-30 RX ORDER — ASPIRIN 325 MG
325 TABLET ORAL ONCE
Status: COMPLETED | OUTPATIENT
Start: 2020-10-30 | End: 2020-10-30

## 2020-10-30 RX ORDER — SULFAMETHOXAZOLE AND TRIMETHOPRIM 400; 80 MG/1; MG/1
1 TABLET ORAL DAILY
Status: DISCONTINUED | OUTPATIENT
Start: 2020-10-30 | End: 2020-10-31 | Stop reason: HOSPADM

## 2020-10-30 RX ORDER — SPIRONOLACTONE 25 MG/1
25 TABLET ORAL
Status: DISCONTINUED | OUTPATIENT
Start: 2020-10-30 | End: 2020-10-30

## 2020-10-30 RX ORDER — LISINOPRIL 5 MG/1
5 TABLET ORAL
Status: DISCONTINUED | OUTPATIENT
Start: 2020-10-30 | End: 2020-10-31 | Stop reason: HOSPADM

## 2020-10-30 RX ADMIN — ASPIRIN 325 MG: 325 TABLET, FILM COATED ORAL at 00:30

## 2020-10-30 RX ADMIN — METOPROLOL SUCCINATE 25 MG: 25 TABLET, EXTENDED RELEASE ORAL at 17:31

## 2020-10-30 RX ADMIN — SULFAMETHOXAZOLE AND TRIMETHOPRIM 1 TABLET: 400; 80 TABLET ORAL at 21:07

## 2020-10-30 RX ADMIN — ACETAMINOPHEN 650 MG: 325 TABLET, FILM COATED ORAL at 17:30

## 2020-10-30 RX ADMIN — SULFAMETHOXAZOLE AND TRIMETHOPRIM 1 TABLET: 400; 80 TABLET ORAL at 05:26

## 2020-10-30 RX ADMIN — SPIRONOLACTONE 25 MG: 25 TABLET ORAL at 12:56

## 2020-10-30 RX ADMIN — LISINOPRIL 5 MG: 5 TABLET ORAL at 12:56

## 2020-10-30 RX ADMIN — ATORVASTATIN CALCIUM 80 MG: 80 TABLET, FILM COATED ORAL at 17:31

## 2020-10-30 RX ADMIN — ACETAMINOPHEN 650 MG: 325 TABLET, FILM COATED ORAL at 05:30

## 2020-10-30 RX ADMIN — ASPIRIN 81 MG: 81 TABLET, CHEWABLE ORAL at 12:56

## 2020-10-30 ASSESSMENT — ENCOUNTER SYMPTOMS
BLURRED VISION: 0
FALLS: 0
PALPITATIONS: 0
SHORTNESS OF BREATH: 0
PALPITATIONS: 1
SPEECH CHANGE: 0
FOCAL WEAKNESS: 0
PND: 0
BACK PAIN: 0
HEADACHES: 1
DOUBLE VISION: 0
WEAKNESS: 0
DIAPHORESIS: 0
WEIGHT LOSS: 0
HEMOPTYSIS: 0
HEARTBURN: 0
TINGLING: 0
CONSTIPATION: 0
TREMORS: 0
DEPRESSION: 0
DIZZINESS: 0
NAUSEA: 0
CHILLS: 0
LOSS OF CONSCIOUSNESS: 0
ORTHOPNEA: 0
DIARRHEA: 0
ABDOMINAL PAIN: 1
MEMORY LOSS: 0
NECK PAIN: 1
PHOTOPHOBIA: 0
EYE PAIN: 0
FEVER: 0
SEIZURES: 0
VOMITING: 0
SENSORY CHANGE: 0
COUGH: 0

## 2020-10-30 ASSESSMENT — COGNITIVE AND FUNCTIONAL STATUS - GENERAL
DAILY ACTIVITIY SCORE: 24
MOBILITY SCORE: 24
SUGGESTED CMS G CODE MODIFIER MOBILITY: CH
SUGGESTED CMS G CODE MODIFIER DAILY ACTIVITY: CH

## 2020-10-30 ASSESSMENT — GAIT ASSESSMENTS
GAIT LEVEL OF ASSIST: INDEPENDENT
DEVIATION: NO DEVIATION
DISTANCE (FEET): 300

## 2020-10-30 ASSESSMENT — LIFESTYLE VARIABLES: SUBSTANCE_ABUSE: 0

## 2020-10-30 ASSESSMENT — ACTIVITIES OF DAILY LIVING (ADL): TOILETING: INDEPENDENT

## 2020-10-30 ASSESSMENT — PAIN DESCRIPTION - PAIN TYPE: TYPE: ACUTE PAIN

## 2020-10-30 NOTE — PROGRESS NOTES
Monitor summary: SR 71-86, CA 0.16, QRS 0.08, QT 0.34,  With rare PVCs and couplets per strip from monitor room.

## 2020-10-30 NOTE — THERAPY
Missed Therapy     Patient Name: Leslie Nazario  Age:  45 y.o., Sex:  female  Medical Record #: [id number]  Today's Date: 10/30/2020    Discussed missed therapy with RN       10/30/20 1035   Interdisciplinary Plan of Care Collaboration   IDT Collaboration with  Nursing   Collaboration Comments This SLP attempted CSE and cognitive evaluations. Per RN, patient appears to have returned to baseline functioning and endorses same. RN reports patient is tolerating regular diet without difficulty. RN to ask MD to cancel orders.

## 2020-10-30 NOTE — PROGRESS NOTES
2 RN skin check completed with Zandra RN.     No skin issues or injuries noted. Skin clean dry and intact.

## 2020-10-30 NOTE — PROGRESS NOTES
Daily Progress Note:     Date of Service: 10/30/2020  Primary Team: [facility] Purple Team   Attending: Maldonado Balderas M.D.   Senior Resident: Dr. Mcneil  Intern: Dr. Chambers  Contact:  [phone]    Chief Complaint:   Slurred speech resolving after a few minutes, TIA    Subjective:  -Admitted overnight  -Denies any further episodes of slurred speech this AM, reports she feels like she is back to baseline  -Received Bubble Study ECHO this AM, pending read    Review of Systems:    Review of Systems   Constitutional: Negative for chills, diaphoresis, fever, malaise/fatigue and weight loss.   HENT: Negative for hearing loss and tinnitus.    Eyes: Negative for blurred vision, double vision, photophobia and pain.   Respiratory: Negative for cough, hemoptysis and shortness of breath.    Cardiovascular: Negative for chest pain, palpitations, orthopnea, leg swelling and PND.   Gastrointestinal: Positive for abdominal pain (Suprapubic tenderness, Chronic). Negative for constipation, diarrhea, heartburn, nausea and vomiting.   Genitourinary: Negative for dysuria, frequency and urgency.   Musculoskeletal: Negative for back pain, falls and joint pain.   Skin: Negative for rash.   Neurological: Positive for headaches (Back of head. Pressure like). Negative for dizziness, tingling, tremors, sensory change, speech change, focal weakness, seizures, loss of consciousness and weakness.   Psychiatric/Behavioral: Negative for depression, memory loss, substance abuse and suicidal ideas.       Objective Data:   Physical Exam:   Vitals:   Temp:  [36.1 °C (97 °F)-36.9 °C (98.5 °F)] 36.6 °C (97.8 °F)  Pulse:  [61-85] 69  Resp:  [15-18] 15  BP: (141-205)/() 152/93  SpO2:  [91 %-99 %] 98 %     Physical Exam  Constitutional:       General: She is not in acute distress.     Appearance: Normal appearance. She is obese. She is not ill-appearing.   HENT:      Head: Normocephalic and atraumatic.      Comments: Tender to palpation around  occiput     Nose: Nose normal.      Mouth/Throat:      Mouth: Mucous membranes are moist.      Pharynx: No oropharyngeal exudate or posterior oropharyngeal erythema.   Eyes:      General: No scleral icterus.     Extraocular Movements: Extraocular movements intact.      Conjunctiva/sclera: Conjunctivae normal.      Pupils: Pupils are equal, round, and reactive to light.   Neck:      Musculoskeletal: Normal range of motion and neck supple. No neck rigidity or muscular tenderness.      Vascular: No carotid bruit.   Cardiovascular:      Rate and Rhythm: Normal rate and regular rhythm.      Pulses: Normal pulses.      Heart sounds: No murmur. No friction rub. No gallop.    Pulmonary:      Effort: Pulmonary effort is normal. No respiratory distress.      Breath sounds: Normal breath sounds. No wheezing.   Chest:      Chest wall: No tenderness.   Abdominal:      General: Abdomen is flat. There is no distension.      Palpations: Abdomen is soft.      Tenderness: There is abdominal tenderness. There is no guarding or rebound.      Comments: Suprapubic tenderness   Musculoskeletal: Normal range of motion.         General: No swelling or tenderness.      Right lower leg: No edema.      Left lower leg: No edema.   Skin:     General: Skin is warm.      Capillary Refill: Capillary refill takes less than 2 seconds.      Coloration: Skin is not jaundiced.      Findings: No lesion or rash.   Neurological:      General: No focal deficit present.      Mental Status: She is alert and oriented to person, place, and time.      Cranial Nerves: No cranial nerve deficit.      Sensory: No sensory deficit.      Motor: No weakness.      Coordination: Coordination normal.      Comments: 5/5 strength in all ext. No deficits on coordination testing   Psychiatric:         Mood and Affect: Mood normal.         Behavior: Behavior normal.           Labs:   Results for MATTHIEU OVIEDO (MRN [id number]) as of 10/30/2020 12:58   Ref. Range 10/30/2020  02:36 10/30/2020 09:59   WBC Latest Ref Range: 4.8 - 10.8 K/uL 7.0    RBC Latest Ref Range: 4.20 - 5.40 M/uL 4.33    Hemoglobin Latest Ref Range: 12.0 - 16.0 g/dL 13.0    Hematocrit Latest Ref Range: 37.0 - 47.0 % 39.2    MCV Latest Ref Range: 81.4 - 97.8 fL 90.5    MCH Latest Ref Range: 27.0 - 33.0 pg 30.0    MCHC Latest Ref Range: 33.6 - 35.0 g/dL 33.2 (L)    RDW Latest Ref Range: 35.9 - 50.0 fL 43.3    Platelet Count Latest Ref Range: 164 - 446 K/uL 221    MPV Latest Ref Range: 9.0 - 12.9 fL 9.8    Neutrophils-Polys Latest Ref Range: 44.00 - 72.00 % 45.70    Neutrophils (Absolute) Latest Ref Range: 2.00 - 7.15 K/uL 3.18    Lymphocytes Latest Ref Range: 22.00 - 41.00 % 42.80 (H)    Lymphs (Absolute) Latest Ref Range: 1.00 - 4.80 K/uL 2.98    Monocytes Latest Ref Range: 0.00 - 13.40 % 8.20    Monos (Absolute) Latest Ref Range: 0.00 - 0.85 K/uL 0.57    Eosinophils Latest Ref Range: 0.00 - 6.90 % 1.70    Eos (Absolute) Latest Ref Range: 0.00 - 0.51 K/uL 0.12    Basophils Latest Ref Range: 0.00 - 1.80 % 1.00    Baso (Absolute) Latest Ref Range: 0.00 - 0.12 K/uL 0.07    Immature Granulocytes Latest Ref Range: 0.00 - 0.90 % 0.60    Immature Granulocytes (abs) Latest Ref Range: 0.00 - 0.11 K/uL 0.04    Nucleated RBC Latest Units: /100 WBC 0.00    NRBC (Absolute) Latest Units: K/uL 0.00    Sed Rate Westergren Latest Ref Range: 0 - 20 mm/hour 14    Sodium Latest Ref Range: 135 - 145 mmol/L 140    Potassium Latest Ref Range: 3.6 - 5.5 mmol/L 3.4 (L) 3.6   Chloride Latest Ref Range: 96 - 112 mmol/L 104    Co2 Latest Ref Range: 20 - 33 mmol/L 25    Anion Gap Latest Ref Range: 7.0 - 16.0  11.0    Glucose Latest Ref Range: 65 - 99 mg/dL 109 (H)    Bun Latest Ref Range: 8 - 22 mg/dL 11    Creatinine Latest Ref Range: 0.50 - 1.40 mg/dL 0.68    GFR If  Latest Ref Range: >60 mL/min/1.73 m 2 >60    GFR If Non  Latest Ref Range: >60 mL/min/1.73 m 2 >60    Calcium Latest Ref Range: 8.5 - 10.5 mg/dL 9.3     AST(SGOT) Latest Ref Range: 12 - 45 U/L 11 (L)    ALT(SGPT) Latest Ref Range: 2 - 50 U/L 17    Alkaline Phosphatase Latest Ref Range: 30 - 99 U/L 72    Total Bilirubin Latest Ref Range: 0.1 - 1.5 mg/dL 0.3    Albumin Latest Ref Range: 3.2 - 4.9 g/dL 3.6    Total Protein Latest Ref Range: 6.0 - 8.2 g/dL 6.0    Globulin Latest Ref Range: 1.9 - 3.5 g/dL 2.4    A-G Ratio Latest Units: g/dL 1.5    Cholesterol,Tot Latest Ref Range: 100 - 199 mg/dL 218 (H)    Triglycerides Latest Ref Range: 0 - 149 mg/dL 270 (H)    HDL Latest Ref Range: >=40 mg/dL 35 (A)    LDL Latest Ref Range: <100 mg/dL 129 (H)    INR Latest Ref Range: 0.87 - 1.13   0.92   PT Latest Ref Range: 12.0 - 14.6 sec  12.6   APTT Latest Ref Range: 24.7 - 36.0 sec  27.1   Stat C-Reactive Protein Latest Ref Range: 0.00 - 0.75 mg/dL 0.68    Bhcg Latest Ref Range: 0.0 - 5.0 mIU/mL  2.8     Imaging:   MRI pending  Problem Representation: 45 year old woman with PMH of Htn, persistent UTI with R nephrectomy, presenting with a episode of slurred speech day of admission, per the patient of indeterminate length, now clinically back to baseline. Concern for TIA, ECHO with bubble study and MRI brain w/o pending.      * TIA (transient ischemic attack)  Assessment & Plan  - Expressive aphasia, confusion, slurred speech on day of presentation  - Symptoms happened at 11AM, and lasted few min, resolved spontaneously   - New occipital headache  - CT head and neck negative  - Admit to obs neurology. Fall, seizure, and asp precautions ordered  - Stroke protocol   - Aspirin 325 once  - Atorvastatin 80mg  - Check TSH, HgbA1c within reference  - lipid profile elevated   - ESR, CRP, PT, PTT unremarkable  - MRI and Echo with bubble study, results unremarkable  - TIA likely 2/2 hypertensive urgency   - Discontinued Permissive Hypertension Protocol  - Resumed home metoprolol, started on Spironolactone and lisinopril     Hypertensive urgency  Assessment & Plan  - Resumed home  metoprolol  - Added Spironolactone and Lisinopril  - Renal Doppler to workup hypertension in relatively young woman  - Aldosterone level ordered  - Renin level ordered      Recurrent UTI  Assessment & Plan  - Nephrectomy of right kidney  - Urology is following closely outpatient, due to concern about valve function  - Pt reports suprapubic pain, which she reports is associated with UTI  - Continue chronic Bactrim therapy  - Pt denies dysuria, frequency, and urgency        Quality Measures:  Code: FULL  VTE: SCDs  Diet: Cardiac diet  Disposition: Inpatient for MRI workup

## 2020-10-30 NOTE — PROGRESS NOTES
Triage officer note    ERP requested admission: Dr. Sow    45-year-old female with prior history of stroke who presented to the hospital with complaint of difficulty speaking.  She underwent imaging studies did not show any acute abnormalities.  Per ER physician patient returned back to her baseline.  She found to have significant elevation of her blood pressure.      I requested R internal medicine resident Dr. Ruiz to evaluate this patient for admission.      CT-CTA NECK WITH & W/O-POST PROCESSING   Final Result      1. No evidence of flow-limiting stenosis in the cervical carotid or cervical vertebral arteries.      CT-CTA HEAD WITH & W/O-POST PROCESS   Final Result      1.  CT angiogram of the [place] within normal limits.      2.  CT head without and with contrast within normal limits

## 2020-10-30 NOTE — THERAPY
Physical Therapy   Initial Evaluation     Patient Name: Leslie Nazario  Age:  45 y.o., Sex:  female  Medical Record #: [id number]  Today's Date: 10/30/2020          Assessment  Patient is 45 y.o. female with a diagnosis of TIA,  HTN and speech difficulty.  Pt was seen for PT evaluation, no neurological findings. Pt is at he baseline independent level for mobility. No speech difficulty noted. No further inpatient or post acute PT needs.      Plan    Recommend Physical Therapy for Evaluation only.     DC Equipment Recommendations: None  Discharge Recommendations: Anticipate that the patient will have no further physical therapy needs after discharge from the hospital          Objective       10/30/20 1133   Balance Assessment   Sitting Balance (Static) Normal   Sitting Balance (Dynamic) Normal   Standing Balance (Static) Normal   Standing Balance (Dynamic) Normal   Weight Shift Sitting Good   Weight Shift Standing Good   Gait Analysis   Gait Level Of Assist Independent   Assistive Device None   Distance (Feet) 300   # of Times Distance was Traveled 1   Deviation No deviation   Comments steady gait    Bed Mobility    Supine to Sit Independent   Sit to Supine Independent   Scooting Independent   Rolling Independent   Functional Mobility   Sit to Stand Independent   Bed, Chair, Wheelchair Transfer Independent

## 2020-10-30 NOTE — SENIOR ADMIT NOTE
Senior Admit Note                              Chief complaint: Hypertension, speech difficulty.     Brief HPI:  Leslie Nazario is a 45 y.o. female with Past medical history notable for history of cholecystectomy , appendicectomy, Hysterectomy and Nephrolithiasis status post right nephrectomy in 2010 and history of abdominal adhesions presenting to the ED on 10/29/20 due hypertension and speech difficulty.  Patient states that this morning when she woke up she had occipital pressure like symptom which has never happened in the past.  She was at work at 11 AM when she suddenly developed difficulty in speech which was noted by her friends.  Episode lasted for few minutes then she returned to her baseline.  She took her blood pressure which was 193/125 which prompted her to come to the ER for further evaluation.  Upon arrival, she had no neurological deficits and her blood pressure was elevated at 207/159.  Of note, patient states that before 2010 she had uncontrolled hypertension but after nephrectomy her blood pressure returned to normal and she was taken off of blood pressure medication but since past 1 year she has again developed  recurrent kidney stones/UTI and is She is also found to be hypertensive for which she is started on metoprolol by her primary care physician.  She checks her blood pressure few times a week which is always in the 160s range. She states that she has frontal headache every day in the morning for which she takes Tylenol.  Denies any previous history of stroke.    In the ED, she is afebrile, pulse in 80s, on room air, /159.  WBC count 7.5, sodium 138, potassium 4.2, kidney function normal, Covid test negative, CTA neck/head negative for any acute findings.     On exam:     Vitals:    10/29/20 1930 10/29/20 2000 10/29/20 2158 10/30/20 0033   BP: 150/90 152/91 156/105 (!) 167/92   Pulse: 77 75 80 72   Resp:   18 18   Temp:   36.1 °C (97 °F) 36.9 °C (98.5 °F)   TempSrc:   Temporal  Temporal   SpO2: 96% 97% 93% 93%   Weight:   113.2 kg (249 lb 9 oz)      Body mass index is 39.09 kg/m².  BP (!) 167/92   Pulse 72   Temp 36.9 °C (98.5 °F) (Temporal)   Resp 18   Wt 113.2 kg (249 lb 9 oz)   SpO2 93%   BMI 39.09 kg/m²   O2 therapy: Pulse Oximetry: 93 %, O2 (LPM): 0, O2 Delivery Device: None - Room Air    Gen/Neuro: NAD, Moving all extremities, no focal deficits,A&Ox3  Heart/Lungs: RRR, no MGR, CTAB  Abdo/Pelvis: Soft NDNT  Skin/Ext: No rashes/erythema, no peripheral edema, no cyanosis, not tender, pulses intact, cap refill <2 sec.    Problem list:   #TIA  #Hypertensive urgency  #Recurrent UTI/kidney stone    TIA  Patient has not occipital headache, confusion and expressive aphasia prior to presentation.  Symptoms resolved prior to presentation  CT head/neck negative for any acute concerns  DDx: TIA, complex migraine, hypertensive urgency.  Plan  Admit to neurology as observation  Stroke protocol initiated  Give aspirin 325 mg once,  Atorvastatin 80 mg,  Allow for permissive hypertension,  Check TSH, HbA1c, lipid profile  Given young age will also check ESR, CRP  Obtain MRI, obtain echocardiogram with bubble study  Aspiration, fall, seizure precaution  Neurology consultation may be considered if symptoms are persistent or MRI concerning for any findings.    Hypertension  Hold home dose metoprolol to allow for permissive hypertension up to 24 hours.  Day team to restart metoprolol.  Patient will need PCP follow-up for optimization of blood pressure medication.    Recurrent UTI  Currently asymptomatic  Continue chronic Bactrim suppression therapy      DVT prophylaxis: SCD's   Code status: Full code.     For complete details, please refer to H&P by Dr. San.     Dennis Ruiz M.D.

## 2020-10-30 NOTE — CARE PLAN
Problem: Safety  Goal: Will remain free from injury  Outcome: PROGRESSING AS EXPECTED   Bed locked and in lowest position.  Bed alarm on.  Treaded socks.  Call light and belongings with in reach.  Pt educated to call for assistance. Pt verbalized understanding.  Hourly rounding in place.      Problem: Knowledge Deficit  Goal: Knowledge of disease process/condition, treatment plan, diagnostic tests, and medications will improve  Outcome: PROGRESSING AS EXPECTED   Discussed POC and medications with patient.  Patient verbalized understanding.

## 2020-10-30 NOTE — ASSESSMENT & PLAN NOTE
- Discontinued Permissive Hypertension Protocol  - Resumed home metoprolol, started lisinopril- Resumed home metoprolol  - Discharge on Lipitor in addition to Metoprolol  - To follow up with PCP following discharge for continued BP optimization

## 2020-10-30 NOTE — ED NOTES
Neuro called to give report. States they will down to get patient. Admitting team at bedside. Lab called to add on.

## 2020-10-30 NOTE — H&P
History & Physical Note    Date of Admission: 10/29/2020  Admission Status: Observation-Outpatient  [facility] Team: [facility] Team  Attending: Dr. Balderas  Senior Resident: Dr. Mcneil  Intern: Dr. Chambers  Contact Number: [phone]    Chief Complaint: Sent by MD    History of Present Illness (HPI):   Leslie is a 45 y.o. female with PMH of R nephrectomy in 2010, frequent complicated UTIs, who presented 10/29/2020 with recent TIA.     She reports that this morning at 11AM, she was at work, speaking to a coworker, when her speech became unintelligible. Pt reports that she was told her speech was slurred and she had word searching difficulty for a few minutes, but then resolved. She is unsure of exact duration. She is unsure if she was able to understand her coworkers during this period. Following resolution of symptoms, she took her BP, which was 193/121. Pt then called her PCP who advised her to come into the hospital. She denies previous stroke/ TIA. Of note, on 10/14, her BP was 160/90, so she was started on metoprolol XL 25mg. She was instructed to take her BP if lightheaded/ dizzy. Patient states that yesterday at urology office, her BP was 199/125.    She also reports neck pain at back of head that is pressure like, and travels around to back of ears. Neck pain started with episode of slurred speech, and has persisted since. She denies any pain/ headache. She denies having this discomfort previously. She reports regular headaches that are localized to forehead. She takes 2x 650 acetaminophen daily for headaches. She had one that started this afternoon, but resolved after getting acetaminophen in ED.    Per chart review, patient was on metoprolol in 2017, but then was lost to follow up and stopped taking this med due to lightheadedness. She later saw her PCP again 1 month ago, when she was restarted on previous dose of metoprolol.    In the ED, temp 98.4 deg F, HR 75, RR 20, /91, sat 97%. WBC 7.5, Na 128, K  4.2, CT-CTA head and neck were negative.    Review of Systems:   Review of Systems   Constitutional: Negative for chills, diaphoresis, fever, malaise/fatigue and weight loss.   HENT: Negative for hearing loss and tinnitus.    Eyes: Negative for blurred vision, double vision, photophobia and pain.   Respiratory: Negative for cough and hemoptysis.    Cardiovascular: Positive for palpitations. Negative for chest pain, orthopnea, leg swelling and PND.   Gastrointestinal: Positive for abdominal pain (Suprapubic tenderness). Negative for constipation, diarrhea, heartburn, nausea and vomiting.   Genitourinary: Negative for dysuria, frequency and urgency.        Pt denies aspiration of fluids today following TIA   Musculoskeletal: Positive for neck pain (Associated with headache). Negative for back pain, falls and joint pain.   Skin: Negative for rash.   Neurological: Positive for headaches (Back of head. Pressure like). Negative for dizziness, tingling, tremors, sensory change, speech change, focal weakness, seizures, loss of consciousness and weakness.   Psychiatric/Behavioral: Negative for depression, substance abuse and suicidal ideas.     Past Medical History:   Past Medical History was reviewed with patient.   has a past medical history of Depression, HTN, Pain (01-24-14), Pain, Renal disorder, and S/p nephrectomy (2010).    Past Surgical History: Past Surgical History was reviewed with patient.   has a past surgical history that includes lysis adhesions general (5/06); IR-URETERAL STENT ANTEGRADE; ureteral reimplantation (4/23/08); stent placement (4/23/08); ureteroscopy; cystoscopy stent placement (8/11/2009); ureteroscopy (8/11/2009); stone retrieval (8/11/2009); stent removal (8/11/2009); nephrectomy laparoscopic (2010); roe by laparoscopy (12/11/2013); abdominal hysterectomy total (2005); cholecystectomy (2013); appendectomy (1991); laparoscopy (1/27/2014); lysis adhesions general (1/27/2014); pr nephrectomy  (2010); tonsillectomy (2004); laparotomy (2005); laparotomy (2009); other ([id number]); hip arthroscopy (12/18/2014); femoral neck osteoplasty (12/18/2014); acetabular osteoplasty (12/18/2014); and synovectomy (12/18/2014).    Medications: Medications have been reviewed with patient.  Prior to Admission Medications   Prescriptions Last Dose Informant Patient Reported? Taking?   metoprolol SR (TOPROL XL) 25 MG TABLET SR 24 HR 10/28/2020 at pm Patient No No   Sig: Take 1 Tab by mouth every day.   nitrofurantoin (MACROBID) 100 MG Cap 10/19/2020 at finished Patient No No   Sig: Take 1 Cap by mouth 2 times a day.   sulfamethoxazole-trimethoprim (BACTRIM) 400-80 MG Tab 10/28/2020 at pm Patient No No   Sig: Take 1 Tab by mouth every day.   Patient taking differently: Take 1 Tab by mouth every day. 3 month course      Facility-Administered Medications: None     Allergies: Allergies have been reviewed with patient.  No Known Allergies    Family History:  family history includes Genitourinary () Problems in her brother; Hypertension in her brother, father, and mother.     Social History:   Tobacco: No  Alcohol: Quit 10 years ago  Recreational drugs (illegal and prescription): No  Employment: Desk job. Works for [employer]   Activity Level: Ambulates well  Living situation: Lives with  and 3 children  Recent travel:  No  Primary Care Provider: gloria Ruiz M.D. Saw her last week. Also closely follow with urology, who she saw yesterday.  Other (stressors, spirituality, exposures): No  Physical Exam:   Vitals:  Temp:  [36.9 °C (98.4 °F)] 36.9 °C (98.4 °F)  Pulse:  [75-97] 75  Resp:  [20] 20  BP: (148-207)/() 152/91  SpO2:  [94 %-99 %] 97 %    Physical Exam  Constitutional:       General: She is in acute distress.      Appearance: Normal appearance. She is obese. She is not ill-appearing or toxic-appearing.   HENT:      Head: Normocephalic and atraumatic.      Nose: Nose normal. No congestion or  rhinorrhea.      Mouth/Throat:      Mouth: Mucous membranes are moist.      Pharynx: No oropharyngeal exudate or posterior oropharyngeal erythema.   Eyes:      General: No scleral icterus.     Extraocular Movements: Extraocular movements intact.      Conjunctiva/sclera: Conjunctivae normal.      Pupils: Pupils are equal, round, and reactive to light.   Neck:      Musculoskeletal: Normal range of motion and neck supple. Muscular tenderness (tenderness of upper neck/ occipital region of head) present. No neck rigidity.      Vascular: No carotid bruit.   Cardiovascular:      Rate and Rhythm: Normal rate and regular rhythm.      Pulses: Normal pulses.      Heart sounds: No murmur. No friction rub. No gallop.    Pulmonary:      Effort: Pulmonary effort is normal. No respiratory distress.      Breath sounds: Normal breath sounds. No stridor. No wheezing, rhonchi or rales.   Chest:      Chest wall: No tenderness.   Abdominal:      General: Abdomen is flat. There is no distension.      Palpations: Abdomen is soft.      Tenderness: There is abdominal tenderness. There is no left CVA tenderness, guarding or rebound. Right CVA tenderness: R kidney removed.      Comments: Suprapubic tenderness   Musculoskeletal: Normal range of motion.         General: No swelling or tenderness.      Right lower leg: No edema.      Left lower leg: No edema.   Skin:     General: Skin is warm.      Capillary Refill: Capillary refill takes less than 2 seconds.      Coloration: Skin is not jaundiced or pale.      Findings: No bruising, erythema, lesion or rash.   Neurological:      General: No focal deficit present.      Mental Status: She is alert and oriented to person, place, and time. Mental status is at baseline.      Cranial Nerves: No cranial nerve deficit.      Sensory: No sensory deficit.      Motor: No weakness.      Coordination: Coordination normal.      Gait: Gait normal.      Deep Tendon Reflexes: Reflexes normal.      Comments: 5/5  strength in all ext. No coordination deficits noted on finger-to-nose test.    Psychiatric:         Mood and Affect: Mood normal.         Behavior: Behavior normal.         Thought Content: Thought content normal.         Judgment: Judgment normal.      Comments: Some anxiety regarding new health condition. Patient appears to have good insight into health       Labs:   Recent Labs     10/29/20  1400   WBC 7.5   RBC 4.74   HEMOGLOBIN 14.2   HEMATOCRIT 41.9   MCV 88.4   MCH 30.0   RDW 42.5   PLATELETCT 271   MPV 10.2   NEUTSPOLYS 59.30   LYMPHOCYTES 31.80   MONOCYTES 6.40   EOSINOPHILS 1.20   BASOPHILS 0.80     Recent Labs     10/29/20  1400   SODIUM 138   POTASSIUM 4.2   CHLORIDE 103   CO2 22   GLUCOSE 93   BUN 8     EKG:  Results for orders placed or performed during the hospital encounter of 10/29/20   EKG   Result Value Ref Range    Report       [facility] Emergency Dept.    Test Date:  2020-10-29  Pt Name:    MATTHIEU OVIEDO              Department: ER  MRN:        [id number]                      Room:  Gender:     Female                       Technician: [id number]  :        1975                   Requested By:ER TRIAGE PROTOCOL  Order #:    [id number]                    Reading MD:    Measurements  Intervals                                Axis  Rate:       90                           P:          36  WA:         150                          QRS:        -4  QRSD:       92                           T:          25  QT:         372  QTc:        455    Interpretive Statements  Sinus rhythm  Baseline wander in lead(s) V5  Compared to ECG 12/15/2014 10:28:35  No significant changes       Imaging:   CT-CTA head with and without  FINDINGS:  The posterior circulation shows the distal vertebral arteries to be patent. The vertebrobasilar confluence is intact. The basilar artery is patent. No aneurysm or occlusive lesion is evident.  The anterior circulation shows no stenotic or occlusive lesion. No  aneurysm is evident about the [place].  CALVARIA:  The calvaria are normal in appearance.  BRAIN:  The brain parenchyma is normal in appearance. .  VENTRICULAR SYSTEM: Ventricular system is normal in size and position.  There is no hemorrhage or evidence for acute infarct.  There are no extra-axial fluid collection.  Sinuses:  The paranasal sinuses are clear.  The mastoid air cells and middle ear are clear.  ORBITS: The visualized orbital contents are normal in appearance.  3D angiographic/MIP images of the vasculature confirm the vascular findings as described above.  IMPRESSION:  1.  CT angiogram of the [place] within normal limits.  2.  CT head without and with contrast within normal limits    CT-CTA neck with and without  FINDINGS:  The aortic arch demonstrate no abnormality. The arch origins of the great vessels are patent.  No calcified atherosclerotic disease identified.  The right common carotid artery, cervical carotid bifurcation, and cervical internal carotid artery are patent with no evidence of significant stenosis, thrombus, or other filling defect or ulceration. There is no evidence of dissection or aneurysm.  The left common carotid artery, cervical carotid bifurcation, and cervical internal carotid artery are patent with no evidence of significant stenosis, thrombus, or other filling defect or ulceration. There is no evidence of dissection or aneurysm.  The cervical vertebral arteries are normal bilaterally.  The neck soft tissues and lung apices in the field of view are unremarkable.  3D angiographic/MIP images of the vasculature confirm the vascular findings as described above.  IMPRESSION:  1. No evidence of flow-limiting stenosis in the cervical carotid or cervical vertebral arteries.    Previous Data Review: reviewed  No prior Echo    Problem Representation:   Leslie is a 45 y.o. female with PMH of nephrectomy in 2010, frequent complicated UTIs, who presented 10/29/2020 with  recent TIA, with symptom resolution, and /121 prior to admission and 152/91 on admission. CT head and neck negative. MRI and Echo with bubble study ordered. Allow for permissive HTN    #TIA  - Expressive aphasia, confusion, slurred speech earlier today  - Symptoms happened at 11AM, and lasted few min, now resolved  - New occipital headache  - CT head and neck negative  - Admit to obs neurology. Fall, seizure, and asp precautions ordered  - Stroke protocol   - Aspirin 325 once  - Atorvastatin 80mg  - Check TSH, HgbA1c, lipid profile   - As patient is young, concern for clotting disorder induced stroke. Check ESR, CRP, PT, PTT  - MRI and Echo with bubble study ordered  - TIA likely 2/2 hypertensive urgency   - Consider neuro consult    #Hypertensive urgency  - Allow for permissive hypertension for 24 hr. Hold metoprolol  - Follow up with PCP, as pt requires improved BP control    #Recurrent UTI   - Nephrectomy of right kidney  - Urology is following closely outpatient, due to concern about valve function  - Pt reports suprapubic pain, which she reports is associated with UTI  - Continue chronic Bactrim therapy  - Pt denies dysuria, frequency, and urgency    DVT ppx: SCDs. Hold enoxaparin for recent TIA.   Full code    No new Assessment & Plan notes have been filed under this hospital service since the last note was generated.  Service: Hospital Medicine

## 2020-10-30 NOTE — THERAPY
Occupational Therapy Contact Note:    Pt is a 46 y/o female who presents to acute due to HTN, AMS, difficulty with speech. PMH includes cholecystectomy, appendectomy, hysterectomy, and nephrolithiasis. Symptoms appear to have resolved and pt is now at baseline performing BADLs at SPV level. Full eval not indicated. Recommend DC home.        10/30/20 1101   Total Time Spent   Total Time Spent (Mins) 15   Charge Group   OT Self Care / ADL 1   Initial Contact Note    Initial Contact Note Order Received and Verified. Occupational Therapy Evaluation NOT Completed Because Patient Does Not Require Acute Occupational Therapy at this Time.   Prior Living Situation   Prior Services None;Home-Independent   Equipment Owned None   Lives with - Patient's Self Care Capacity Spouse;Child Less than 18 Years of Age;Adult Children  (3 kids, 16 y/o and up)   Comments Reports 16 y/o requires a caregiver when she is gone due to cognition   Prior Level of ADL Function   Self Feeding Independent   Grooming / Hygiene Independent   Bathing Independent   Dressing Independent   Toileting Independent   Prior Level of IADL Function   Medication Management Independent   Laundry Independent   Kitchen Mobility Independent   Finances Independent   Home Management Independent   Shopping Independent   Prior Level Of Mobility Independent Without Device in Community;Independent Without Device in Home   Driving / Transportation Driving Independent   Occupation (Pre-Hospital Vocational) Employed Full Time  ([employer])   Vitals   O2 (LPM) 0   O2 Delivery Device None - Room Air   Cognition    Cognition / Consciousness WDL   Level of Consciousness Alert   Comments pleasent and cooperative    Active ROM Upper Body   Active ROM Upper Body  WDL   Strength Upper Body   Upper Body Strength  WDL   Coordination Upper Body   Coordination WDL   Balance Assessment   Sitting Balance (Static) Good   Sitting Balance (Dynamic) Good   Standing Balance (Static) Good    Standing Balance (Dynamic) Good   Weight Shift Sitting Good   Weight Shift Standing Good   Comments no AD   Bed Mobility    Supine to Sit Supervised   Sit to Supine Supervised   Scooting Supervised   ADL Assessment   Grooming Supervision;Standing   Lower Body Dressing Supervision   How much help from another person does the patient currently need...   Putting on and taking off regular lower body clothing? 4   Bathing (including washing, rinsing, and drying)? 4   Toileting, which includes using a toilet, bedpan, or urinal? 4   Putting on and taking off regular upper body clothing? 4   Taking care of personal grooming such as brushing teeth? 4   Eating meals? 4   6 Clicks Daily Activity Score 24   Functional Mobility   Sit to Stand Supervised   Mobility household distance no AD   Edema / Skin Assessment   Edema / Skin  Not Assessed   Education Group   Role of Occupational Therapist Patient Response Patient;Acceptance;Explanation;Demonstration;Verbal Demonstration   Problem List   Problem List None   Anticipated Discharge Equipment and Recommendations   DC Equipment Recommendations None   Discharge Recommendations Anticipate that the patient will have no further occupational therapy needs after discharge from the hospital   Interdisciplinary Plan of Care Collaboration   IDT Collaboration with  Nursing;Physical Therapist   Patient Position at End of Therapy In Bed;Call Light within Reach;Tray Table within Reach;Phone within Reach   Collaboration Comments Report given   Session Information   Date / Session Number  10/30, Ed only, eval not indicated     Jennyfer Mejía, CORTESR/L

## 2020-10-30 NOTE — ASSESSMENT & PLAN NOTE
- Expressive aphasia, confusion, slurred speech on day of presentation  - Symptoms happened at 11AM, and lasted few min, resolved spontaneously   - New occipital headache  - CT head and neck negative  - Admit to obs neurology. Fall, seizure, and asp precautions ordered  - Stroke protocol   - Aspirin 325 once  - Atorvastatin 80mg  - Check TSH, HgbA1c within reference  - lipid profile elevated   - ESR, CRP, PT, PTT unremarkable  - MRI and Echo with bubble study, results unremarkable  - TIA likely 2/2 hypertensive urgency   - Discontinued Permissive Hypertension Protocol  - Resumed home metoprolol, started on Spironolactone and lisinopril- Resumed home metoprolol  - Added Spironolactone and Lisinopril  - Renal Doppler to workup hypertension in relatively young woman  - Aldosterone level ordered  - Renin level ordered

## 2020-10-31 ENCOUNTER — APPOINTMENT (OUTPATIENT)
Dept: RADIOLOGY | Facility: MEDICAL CENTER | Age: 45
End: 2020-10-31
Attending: STUDENT IN AN ORGANIZED HEALTH CARE EDUCATION/TRAINING PROGRAM
Payer: COMMERCIAL

## 2020-10-31 VITALS
TEMPERATURE: 97.8 F | HEART RATE: 63 BPM | DIASTOLIC BLOOD PRESSURE: 84 MMHG | BODY MASS INDEX: 39.09 KG/M2 | WEIGHT: 249.56 LBS | RESPIRATION RATE: 17 BRPM | OXYGEN SATURATION: 93 % | SYSTOLIC BLOOD PRESSURE: 131 MMHG

## 2020-10-31 PROBLEM — I16.0 HYPERTENSIVE URGENCY: Status: RESOLVED | Noted: 2020-10-30 | Resolved: 2020-10-31

## 2020-10-31 PROBLEM — G45.9 TIA (TRANSIENT ISCHEMIC ATTACK): Status: ACTIVE | Noted: 2020-10-31

## 2020-10-31 PROBLEM — G45.9 TIA (TRANSIENT ISCHEMIC ATTACK): Status: RESOLVED | Noted: 2020-10-30 | Resolved: 2020-10-31

## 2020-10-31 PROCEDURE — 700102 HCHG RX REV CODE 250 W/ 637 OVERRIDE(OP): Performed by: STUDENT IN AN ORGANIZED HEALTH CARE EDUCATION/TRAINING PROGRAM

## 2020-10-31 PROCEDURE — 99217 PR OBSERVATION CARE DISCHARGE: CPT | Mod: GC | Performed by: INTERNAL MEDICINE

## 2020-10-31 PROCEDURE — A9270 NON-COVERED ITEM OR SERVICE: HCPCS | Performed by: STUDENT IN AN ORGANIZED HEALTH CARE EDUCATION/TRAINING PROGRAM

## 2020-10-31 PROCEDURE — G0378 HOSPITAL OBSERVATION PER HR: HCPCS

## 2020-10-31 RX ORDER — LISINOPRIL 5 MG/1
5 TABLET ORAL DAILY
Qty: 30 TAB | Refills: 0 | Status: SHIPPED | OUTPATIENT
Start: 2020-10-31 | End: 2020-11-17 | Stop reason: SDUPTHER

## 2020-10-31 RX ORDER — ATORVASTATIN CALCIUM 10 MG/1
10 TABLET, FILM COATED ORAL EVERY EVENING
Qty: 30 TAB | Refills: 0 | Status: SHIPPED | OUTPATIENT
Start: 2020-10-31 | End: 2020-11-17 | Stop reason: SDUPTHER

## 2020-10-31 RX ORDER — ASPIRIN 81 MG/1
81 TABLET, CHEWABLE ORAL DAILY
Qty: 100 TAB | Refills: 0 | Status: CANCELLED | OUTPATIENT
Start: 2020-10-31

## 2020-10-31 RX ORDER — ASPIRIN 81 MG/1
81 TABLET, CHEWABLE ORAL DAILY
Qty: 100 TAB | Refills: 0 | Status: SHIPPED | OUTPATIENT
Start: 2020-11-01 | End: 2021-02-16 | Stop reason: SDUPTHER

## 2020-10-31 RX ADMIN — ASPIRIN 81 MG: 81 TABLET, CHEWABLE ORAL at 05:02

## 2020-10-31 RX ADMIN — LISINOPRIL 5 MG: 5 TABLET ORAL at 05:02

## 2020-10-31 ASSESSMENT — ENCOUNTER SYMPTOMS
FEVER: 0
SPEECH CHANGE: 0
NAUSEA: 0
WEIGHT LOSS: 0
SENSORY CHANGE: 0
TREMORS: 0
CONSTIPATION: 0
DOUBLE VISION: 0
DEPRESSION: 0
DIARRHEA: 0
EYE PAIN: 0
ORTHOPNEA: 0
VOMITING: 0
HEADACHES: 0
PALPITATIONS: 0
TINGLING: 0
COUGH: 0
PHOTOPHOBIA: 0
CHILLS: 0
FALLS: 0
DIZZINESS: 0
HEARTBURN: 0
LOSS OF CONSCIOUSNESS: 0
ABDOMINAL PAIN: 1
SHORTNESS OF BREATH: 0
DIAPHORESIS: 0
PND: 0
WEAKNESS: 0
HEMOPTYSIS: 0
BACK PAIN: 0
FOCAL WEAKNESS: 0
BLURRED VISION: 0
MEMORY LOSS: 0
SEIZURES: 0

## 2020-10-31 ASSESSMENT — LIFESTYLE VARIABLES: SUBSTANCE_ABUSE: 0

## 2020-10-31 NOTE — PROGRESS NOTES
Patient discharged home. IV removed. Gathered all patient belongings. Discharge instructions given to patient. All questions answered.

## 2020-10-31 NOTE — ASSESSMENT & PLAN NOTE
- Discontinued Permissive Hypertension Protocol  - Resumed home metoprolol, started on Spironolactone and lisinopril- Resumed home metoprolol  - Added Spironolactone and Lisinopril  - Renal Doppler to workup hypertension in relatively young woman  - Aldosterone level ordered  - Renin level ordered

## 2020-10-31 NOTE — ASSESSMENT & PLAN NOTE
- Expressive aphasia, confusion, slurred speech on day of presentation  - Symptoms happened at 11AM, and lasted few min, resolved spontaneously   - New occipital headache  - CT head and neck negative  - Admit to obs neurology. Fall, seizure, and asp precautions ordered  - Stroke protocol   - Aspirin 325 once  - Atorvastatin 80mg while inpatient  - TSH, HgbA1c within reference  - lipid profile elevated   - ESR, CRP, PT, PTT unremarkable  - MRI and Echo with bubble study, results unremarkable  - Very low suspicion for infectious, inflammatory or ischemic process, most likely TIA secondary to elevated Bps  - Discharge home with statin and aspirin

## 2020-10-31 NOTE — PROGRESS NOTES
Monitor Summary: SR 62-75, OH .16, QRS .08, QT .40 with frequent PVC,couplet,bigem & trigem per strip from monitor room

## 2020-10-31 NOTE — NON-PROVIDER
"Daily Progress Note:     Date of Service: 10/30/2020  Primary Team: [facility]   Attending: Maldonado Balderas M.D.   Senior Resident: Dr. Mcneil  Intern: Dr. Chambers  Contact:  [phone]    Chief Complaint: Slurred speech    45 y.o. F PMH uncontrolled HTN on metoprolol, dyslipidemia, recurrent cystitis, recurrent nephrolithiasis with resultant right nephrectomy (2010) admitted for episode of \"short\" incomprehensible speech at work lasting approximately <5 minutes with associated confusion which lasted multiple hours and has resolved at this time. She also reports having an episode of unintelligible speech 8 days ago which resolved and had no associated confusion. Today she is A&Ox4 and feels improved. She has had episodes of hypertensive urgency which are currently being treated with anti-hypertensives. All questions answered at this time.     Interval Events:  Episodic hypertensive urgency episodes  Improvement of confusion overnight    Consultants/Specialty:    Review of Systems:    Constitutional: Negative for chills and fever.   HENT: Negative for hearing loss and tinnitus.    Eyes: Negative for blurred vision and double vision.   Respiratory: Negative for cough and shortness of breath.    Cardiovascular: Negative for chest pain, leg swelling. Reports intermittent palpitations over right sternal border resulting in diaphoresis and chills without chest pain.   Gastrointestinal: Negative for nausea. Negative for abdominal pain, diarrhea and vomiting.   Musculoskeletal: Negative for neck pain.   Neurological: Negative for dizziness, focal weakness, weakness. Reports frontal headaches. Denies occipital headache at this time.      Objective Data:   Physical Exam:   Vitals:   Temp:  [36.1 °C (97 °F)-36.9 °C (98.5 °F)] 36.5 °C (97.7 °F)  Pulse:  [61-86] 78  Resp:  [15-18] 15  BP: (141-167)/() 143/99  SpO2:  [91 %-98 %] 97 %    Physical Exam  Constitutional:       General: No acute distress.      " Appearance: Normal appearance. Not ill-appearing or toxic-appearing.   HENT:      Head: Normocephalic and atraumatic.      Right Ear: Tympanic membrane normal.      Left Ear: Tympanic membrane normal.      Mouth/Throat: Mucous membranes are moist.   Eyes:      Extraocular Movements: Extraocular movements intact.      Conjunctiva/sclera: Conjunctivae normal.      Pupils: Pupils are equal, round, and reactive to light.   Neck:      Musculoskeletal: Neck supple. No carotid bruits.   Cardiovascular:      Rate and Rhythm: Normal rate and regular rhythm.      Pulses: Normal pedal pulses bilaterally.      Heart sounds: No murmur. No friction rub. No gallop.   Pulmonary:      Effort: No respiratory distress.      Breath sounds: Normal breath sounds. No stridor. No wheezing, rhonchi or rales.   Abdominal:      General: Obese abdomen.     Palpations: Abdomen is soft.      Tenderness: There is no abdominal tenderness.   Musculoskeletal: Normal range of motion.         General: No swelling or deformity.   Skin:     General: Skin is warm and dry.  Neurological:      Mental Status: A&Ox4.      Cranial Nerves: CN II-XII intact.      Sensory: No sensory deficit.      Motor: No weakness.   Psychiatric:         Mood and Affect: Mood normal.         Behavior: Behavior normal.      Scheduled Medications   Medication Dose Frequency   • sulfamethoxazole-trimethoprim  1 Tab DAILY   • aspirin  81 mg DAILY   • metoprolol SR  25 mg Q EVENING   • lisinopril  5 mg Q DAY   • senna-docusate  2 Tab BID   • atorvastatin  80 mg Q EVENING     Labs:   Recent Labs     10/29/20  1400 10/30/20  0236   WBC 7.5 7.0   RBC 4.74 4.33   HEMOGLOBIN 14.2 13.0   HEMATOCRIT 41.9 39.2   MCV 88.4 90.5   MCH 30.0 30.0   RDW 42.5 43.3   PLATELETCT 271 221   MPV 10.2 9.8   NEUTSPOLYS 59.30 45.70   LYMPHOCYTES 31.80 42.80*   MONOCYTES 6.40 8.20   EOSINOPHILS 1.20 1.70   BASOPHILS 0.80 1.00     Recent Labs     10/29/20  1400 10/30/20  0236 10/30/20  0959   SODIUM 138  140  --    POTASSIUM 4.2 3.4* 3.6   CHLORIDE 103 104  --    CO2 22 25  --    GLUCOSE 93 109*  --    BUN 8 11  --      Recent Labs     10/29/20  1400 10/30/20  0236   ALBUMIN  --  3.6   TBILIRUBIN  --  0.3   ALKPHOSPHAT  --  72   TOTPROTEIN  --  6.0   ALTSGPT  --  17   ASTSGOT  --  11*   CREATININE 0.72 0.68     Imaging:   MR-BRAIN-W/O   Final Result      1.  No acute abnormality.   2.  A few nonspecific T2 hyperintensities in the supratentorial white matter likely representing nonspecific foci of gliosis.      EC-ECHOCARDIOGRAM COMPLETE W/O CONT   Final Result      CT-CTA NECK WITH & W/O-POST PROCESSING   Final Result      1. No evidence of flow-limiting stenosis in the cervical carotid or cervical vertebral arteries.      CT-CTA HEAD WITH & W/O-POST PROCESS   Final Result      1.  CT angiogram of the [place] [facility] [place] within normal limits.      2.  CT head without and with contrast within normal limits      US-RENAL ARTERY DUPLEX COMP    (Results Pending)     Problem Representation:     45 y.o. F w/ PMH uncontrolled HTN on metoprolol, dyslipidemia, recurrent cystitis, recurrent nephrolithiasis with resultant right nephrectomy (2010) admitted for episode of unintelligible speech (5 minutes) and confusion which has resolved at this time. Today she is A&Ox4 and feels improved. She has had episodes of hypertensive urgency which is currently being treated. All questions answered at this time.     # Transient Ischemic Attack  # Hypertensive urgency  Assessment:  Based on patients account the attack was sudden-onset with no associated weakness or sensory deficit.   - Hypertensive urgency on arrival /159 which has now resolved within 24 hours   - Adequate drop in blood pressure consistent with guidelines for treating hypertensive urgency   - CT Neck and Head were both normal with no signs of carotid stenosis, hemorrhage or stroke, respectively  - MRI was ordered to assess for microvascular bleed and shows no acute  abnormality   - Renal Artery Duplex to rule out renovascular cause of hypertension  Plan:   - Control hypertension to decrease subsequent risk of cerebrovascular accident   - Metoprolol increased to 25 mg tablet PO qhs  - Lisinopril 5 mg PO once daily    - Continue to monitor blood pressure throughout the stay  - Order Renal Artery Duplex    # Dyslipidemia  Assessment:   - Atherosclerosis is a risk factor for transient ischemic attack   - Cholesterol: 218, Triglycerides: 270, HDL: 35, LDL: 129  Plan:  - Atorvastatin 80 mg started for dyslipidemia and will continue at home    # UTI  Assessment:   - Patient was recently for urinary tract infection beginning 1 month ago and was placed on repetitive course of 5 days of bactrim, 5 days of nitrofurantoin (completed) and is now on a 3 month course of bactrim  Plan:  - Continue Bactrim 400-80 mg per tablet (2 tab) BID    Zeinab Gannon, MS3

## 2020-10-31 NOTE — PROGRESS NOTES
"Daily Progress Note:     Date of Service: 10/31/2020  Primary Team: [facility]   Attending: Maldonado Balderas M.D.   Senior Resident: Dr. Mcneil  Intern: Dr. Chambers  Contact:  [phone]    Chief Complaint:   Slurred speech resolved spontaneously, possible TIA    Subjective:  -No acute events overnight  -MRI brain completed yesterday, negative for acute changes, noted some  Nonspecific T2 hyperintensity   -Inflammation labs resulted as negative  -Bps have been well controlled, no hypotensive episodes  -Reports she is feeling \"good\" this AM      Review of Systems:    Review of Systems   Constitutional: Negative for chills, diaphoresis, fever, malaise/fatigue and weight loss.   HENT: Negative for hearing loss and tinnitus.    Eyes: Negative for blurred vision, double vision, photophobia and pain.   Respiratory: Negative for cough, hemoptysis and shortness of breath.    Cardiovascular: Negative for chest pain, palpitations, orthopnea, leg swelling and PND.   Gastrointestinal: Positive for abdominal pain (Suprapubic tenderness, Chronic). Negative for constipation, diarrhea, heartburn, nausea and vomiting.   Genitourinary: Negative for dysuria, frequency and urgency.   Musculoskeletal: Negative for back pain, falls and joint pain.   Skin: Negative for rash.   Neurological: Negative for dizziness, tingling, tremors, sensory change, speech change, focal weakness, seizures, loss of consciousness, weakness and headaches.   Psychiatric/Behavioral: Negative for depression, memory loss, substance abuse and suicidal ideas.       Objective Data:   Physical Exam:   Vitals:   Temp:  [36 °C (96.8 °F)-36.6 °C (97.8 °F)] 36.6 °C (97.8 °F)  Pulse:  [63-86] 63  Resp:  [15-17] 17  BP: (124-156)/() 131/84  SpO2:  [93 %-97 %] 93 %     Physical Exam  Constitutional:       General: She is not in acute distress.     Appearance: Normal appearance. She is obese. She is not ill-appearing.   HENT:      Head: Normocephalic and " "atraumatic.      Nose: Nose normal.      Mouth/Throat:      Mouth: Mucous membranes are moist.      Pharynx: No oropharyngeal exudate or posterior oropharyngeal erythema.   Eyes:      General: No scleral icterus.     Extraocular Movements: Extraocular movements intact.      Conjunctiva/sclera: Conjunctivae normal.      Pupils: Pupils are equal, round, and reactive to light.   Neck:      Musculoskeletal: Normal range of motion and neck supple. No neck rigidity or muscular tenderness.      Vascular: No carotid bruit.   Cardiovascular:      Rate and Rhythm: Normal rate and regular rhythm.      Pulses: Normal pulses.      Heart sounds: No murmur. No friction rub. No gallop.    Pulmonary:      Effort: Pulmonary effort is normal. No respiratory distress.      Breath sounds: Normal breath sounds. No wheezing.   Chest:      Chest wall: No tenderness.   Abdominal:      General: Abdomen is flat. There is no distension.      Palpations: Abdomen is soft.      Tenderness: There is abdominal tenderness. There is no guarding or rebound.      Comments: Suprapubic tenderness   Musculoskeletal: Normal range of motion.         General: No swelling or tenderness.      Right lower leg: No edema.      Left lower leg: No edema.   Skin:     General: Skin is warm.      Capillary Refill: Capillary refill takes less than 2 seconds.      Coloration: Skin is not jaundiced.      Findings: No lesion or rash.   Neurological:      General: No focal deficit present.      Mental Status: She is alert and oriented to person, place, and time.      Cranial Nerves: No cranial nerve deficit.      Sensory: No sensory deficit.      Motor: No weakness.      Coordination: Coordination normal.      Comments: 5/5 strength in all ext. No deficits on coordination testing   Psychiatric:         Mood and Affect: Mood normal.         Behavior: Behavior normal.           Labs:   No new labs  Imaging:   MRI brain: per radiology read:    \"1.  No acute abnormality.  2.  " "A few nonspecific T2 hyperintensities in the supratentorial white matter likely representing nonspecific foci of gliosis.\"    Problem Representation: 45 year old woman with PMH of Htn, persistent UTI with R nephrectomy, presenting with a episode of slurred speech day of admission, per the patient of indeterminate length, now clinically back to baseline. Most likely TIA, very low clinical suspicion for ischemic, inflammatory, or infectious etiology given negative lab workup.    * TIA (transient ischemic attack)  Assessment & Plan  - Expressive aphasia, confusion, slurred speech on day of presentation  - Symptoms happened at 11AM, and lasted few min, resolved spontaneously   - New occipital headache  - CT head and neck negative  - Admit to obs neurology. Fall, seizure, and asp precautions ordered  - Stroke protocol   - Aspirin 325 once  - Atorvastatin 80mg  - Check TSH, HgbA1c within reference  - lipid profile elevated   - ESR, CRP, PT, PTT unremarkable  - MRI and Echo with bubble study, results unremarkable  - TIA likely 2/2 hypertensive urgency   - Discontinued Permissive Hypertension Protocol  - Resumed home metoprolol, started on Spironolactone and lisinopril     Hypertensive urgency  Assessment & Plan  - Resumed home metoprolol  - Added Spironolactone and Lisinopril  - Renal Doppler to workup hypertension in relatively young woman  - Aldosterone level ordered  - Renin level ordered      Recurrent UTI  Assessment & Plan  - Nephrectomy of right kidney  - Urology is following closely outpatient, due to concern about valve function  - Pt reports suprapubic pain, which she reports is associated with UTI  - Continue chronic Bactrim therapy  - Pt denies dysuria, frequency, and urgency          Quality Measures:  Code: FULL  VTE: SCDs  Diet: Cardiac diet  Disposition: Discharge to home  "

## 2020-10-31 NOTE — DISCHARGE SUMMARY
Discharge Summary    CHIEF COMPLAINT ON ADMISSION  Chief Complaint   Patient presents with   • Sent by MD   • Hypertension   • Headache       Reason for Admission  Sent by MD     Admission Date  10/29/2020    CODE STATUS   Full Code    HPI & HOSPITAL COURSE  This is a 45 y.o. female here with a transient episode of reported slurring of words and questionable aphasia day of admission, which resolved spontaneously. Per the patient, she found that around the time of this episode her blood pressures were elevated to the 200s/150s. She was subsequently admitted to the hospital with a diagnosis of TIA. While in the hospital her blood pressure was brought under control, she received an MRI brain, which was unremarkable for any acute process but was positive for nonspecific T2 hyperintensity, an ECHO bubble study which was unremarkable. Over the course of her stay patient did not exhibit any neurological symptoms. She had ESR, CRP, TSH, PT and PTT labs drawn, all of which were unremarkable. A lipid panel was drawn which was notable for high cholesterol, high triglycerides, low HDL, and high LDL. She was started on lisinopril in addition to her home metoprolol dosing and was started on aspirin and a statin. Given her negative lab work and the results of the imaging there was very low clinical suspicion for any ischemic, infectious, or inflammatory process, and that her symptoms are best explained by a TIA. Her ASCVD score was estimated to be around 6.3% chance of cardiovascular event in the next 10 years and, coupled with this recent admission for TIA, it was determined that the patient should be started on aspirin and statin outpatient. She was also started on lisinopril in addition to her home metoprolol, with instruction to keep a log of her blood pressures to show when she follows up with her PCP.        Therefore, she is discharged in fair and stable condition to home with close outpatient follow-up.    The patient  recovered much more quickly than anticipated on admission.    Discharge Date  10/31/2020    FOLLOW UP ITEMS POST DISCHARGE      DISCHARGE DIAGNOSES  Principal Problem:    Recurrent UTI POA: Unknown  Active Problems:    Hypertensive urgency POA: Unknown    TIA (transient ischemic attack) POA: Unknown  Resolved Problems:    * No resolved hospital problems. *      FOLLOW UP  Future Appointments   Date Time Provider Department Center   11/17/2020  4:00 PM Love Ruiz M.D. [facility]     No follow-up provider specified.    MEDICATIONS ON DISCHARGE     Medication List      START taking these medications      Instructions   aspirin 81 MG Chew chewable tablet  Start taking on: November 1, 2020  Commonly known as: ASA   Take 1 Tab by mouth every day.  Dose: 81 mg     atorvastatin 10 MG Tabs  Commonly known as: LIPITOR   Take 1 Tab by mouth every evening.  Dose: 10 mg     lisinopril 5 MG Tabs  Commonly known as: PRINIVIL   Take 1 Tab by mouth every day.  Dose: 5 mg        CHANGE how you take these medications      Instructions   sulfamethoxazole-trimethoprim 400-80 MG Tabs  What changed: additional instructions  Commonly known as: Bactrim   Doctor's comments: Start after Nitrofurantoin is finished.  Take 1 Tab by mouth every day.  Dose: 1 Tab        CONTINUE taking these medications      Instructions   metoprolol SR 25 MG Tb24  Commonly known as: TOPROL XL   Take 1 Tab by mouth every day.  Dose: 25 mg        STOP taking these medications    nitrofurantoin 100 MG Caps  Commonly known as: MACROBID            Allergies  No Known Allergies    DIET  Orders Placed This Encounter   Procedures   • Diet Order Cardiac     Standing Status:   Standing     Number of Occurrences:   1     Order Specific Question:   Diet:     Answer:   Cardiac [6]       ACTIVITY  As tolerated.  Weight bearing as tolerated    CONSULTATIONS  No consultations were placed during this Admission    PROCEDURES  Cardiac ECHO bubble study on 10/30/2020  MRI  brain on 10/30/2020    LABORATORY  Lab Results   Component Value Date    SODIUM 140 10/30/2020    POTASSIUM 3.6 10/30/2020    CHLORIDE 104 10/30/2020    CO2 25 10/30/2020    GLUCOSE 109 (H) 10/30/2020    BUN 11 10/30/2020    CREATININE 0.68 10/30/2020    CREATININE 0.8 04/17/2009        Lab Results   Component Value Date    WBC 7.0 10/30/2020    HEMOGLOBIN 13.0 10/30/2020    HEMATOCRIT 39.2 10/30/2020    PLATELETCT 221 10/30/2020        Total time of the discharge process exceeds 42 minutes.

## 2020-10-31 NOTE — DISCHARGE INSTRUCTIONS
Discharge Instructions    Discharged to home by car with relative. Discharged via wheelchair, hospital escort: Yes.  Special equipment needed: Not Applicable    Be sure to schedule a follow-up appointment with your primary care doctor or any specialists as instructed.     Discharge Plan:   Diet Plan: Discussed  Activity Level: Discussed  Confirmed Follow up Appointment: Patient to Call and Schedule Appointment  Confirmed Symptoms Management: Discussed  Medication Reconciliation Updated: Yes  Influenza Vaccine Indication: Not indicated: Previously immunized this influenza season and > 8 years of age    I understand that a diet low in cholesterol, fat, and sodium is recommended for good health. Unless I have been given specific instructions below for another diet, I accept this instruction as my diet prescription.   Other diet: Cardiac    Special Instructions:     -Continue to check your blood pressure regularly. If you feel light headed or measure a blood pressure below 100 for the systolic (top) number or below 60 diastolic (bottom) number, then please call your PCP.  -Record your blood pressures and heart rate readings in a log, please bring that with you to your PCP appointment  -You have been provided with a prescription slip for Lab work, please come in to have your potassium level measured.     Stroke/CVA/TIA/Hemorrhagic Ischemia Discharge Instructions  You have had a stroke. Your risk factors have been identified as follows:  High blood pressure  Overweight  It is important that you reduce your risk factors to avoid another stroke in the future. Here are some general guidelines to follow:  · Eat healthy - avoid food high in fat.  · Get regular exercise.  · Maintain a healthy weight.  · Avoid smoking.  · Avoid alcohol and illegal drug use.  · Take your medications as directed.  For more information regarding risk factors, refer to pages 17-19 in your Stroke Patient Education Guide. Stroke Education Guide was  given to patient.    Warning signs of a stroke include (which can also be found on page 3 of your Stroke Patient Education Guide):  · Sudden numbness of weakness of the face, arm or leg (especially on one side of the body).  · Sudden confusion, trouble speaking or understanding.  · Sudden trouble seeing in one or both eyes.  · Sudden trouble walking, dizziness, loss of balance or coordination.  · Sudden severe headache with no known cause.  It is very important to get treatment quickly when a stroke occurs. If you experience any of the above warning signs, call 011 immediately.     Some patients who have had a stroke will be going home on a blood thinner medication called Warfarin (Coumadin).  This medication requires very close monitoring and follow up.  This follow up can be provided by either your Primary Care Physician or by [facility]'s Outpatient Anticoagulation Service.  The Outpatient Anticoagulation Service is located at the [facility] at [facility] ([facility]).  If you do not know when your follow up appointment is scheduled, call [phone] to verify your appointment time.      · Is patient discharged on Warfarin / Coumadin?   No     Depression / Suicide Risk    As you are discharged from this [facility], it is important to learn how to keep safe from harming yourself.    Recognize the warning signs:  · Abrupt changes in personality, positive or negative- including increase in energy   · Giving away possessions  · Change in eating patterns- significant weight changes-  positive or negative  · Change in sleeping patterns- unable to sleep or sleeping all the time   · Unwillingness or inability to communicate  · Depression  · Unusual sadness, discouragement and loneliness  · Talk of wanting to die  · Neglect of personal appearance   · Rebelliousness- reckless behavior  · Withdrawal from people/activities they love  · Confusion- inability to  concentrate     If you or a loved one observes any of these behaviors or has concerns about self-harm, here's what you can do:  · Talk about it- your feelings and reasons for harming yourself  · Remove any means that you might use to hurt yourself (examples: pills, rope, extension cords, firearm)  · Get professional help from the community (Mental Health, Substance Abuse, psychological counseling)  · Do not be alone:Call your Safe Contact- someone whom you trust who will be there for you.  · Call your local CRISIS HOTLINE 988-3662 or 874-978-9360  · Call your local Children's Mobile Crisis Response Team [place] (082) 126-5337 or www.Tipjoy  · Call the toll free National Suicide Prevention Hotlines   · National Suicide Prevention Lifeline 528-898-BRZT (4393)  · National Hope Line Network 800-SUICIDE (057-3027)

## 2020-11-01 NOTE — DISCHARGE SUMMARY
Discharge Summary    CHIEF COMPLAINT ON ADMISSION  Chief Complaint   Patient presents with   • Sent by MD   • Hypertension   • Headache       Reason for Admission  Sent by MD     Admission Date  10/29/2020    CODE STATUS  Full Code    HPI & HOSPITAL COURSE  This is a 45 y.o. female here with a transient episode of reported slurring of words and questionable aphasia day of admission, which resolved spontaneously. Per the patient, she found that around the time of this episode her blood pressures were elevated to the 200s/150s. She was subsequently admitted to the hospital with a likely diagnosis of hypertensive urgency. While in the hospital her blood pressure was brought under control, she received an MRI brain, which was unremarkable for any acute process but was positive for nonspecific T2 hyperintensity, an ECHO bubble study which was unremarkable. Over the course of her stay patient did not exhibit any neurological symptoms. She had ESR, CRP, TSH, PT and PTT labs drawn, all of which were unremarkable. A lipid panel was drawn which was notable for high cholesterol, high triglycerides, low HDL, and high LDL. She was started on lisinopril in addition to her home metoprolol dosing and was started on aspirin and a statin. Given her negative lab work and the results of the imaging there was very low clinical suspicion for any ischemic, infectious, or inflammatory process, and that her symptoms are best explained by hypertensive urgency. Her ASCVD score was estimated to be around 6.3% chance of cardiovascular event in the next 10 years and, coupled with this recent admission for hypertensive urgency, it was determined that the patient should be started on aspirin and statin outpatient. She was also started on lisinopril in addition to her home metoprolol, with instruction to keep a log of her blood pressures to show when she follows up with her PCP.      Therefore, she is discharged in fair and stable condition to home  with close outpatient follow-up.     The patient recovered much more quickly than anticipated on admission.    Discharge Date  10/31/2020    FOLLOW UP ITEMS POST DISCHARGE  -Follow up with PCP regarding blood pressure management and medication followup.     DISCHARGE DIAGNOSES  Principal Problem:   Hypertensive urgency POA: Unknown  Active Problems:    Recurrent UTI POA: Unknown  Resolved Problems:    * No resolved hospital problems. *      FOLLOW UP  Future Appointments   Date Time Provider Department Center   11/17/2020  4:00 PM Love Ruiz M.D. [facility] [address]     [facility]  [address], [address]  [facility] [id number] [phone]    Hypertensive Urgency follow-up    Love Ruiz M.D.  [address]  [facility] [address]  [facility] [id number] [phone]            MEDICATIONS ON DISCHARGE     Medication List      START taking these medications      Instructions   aspirin 81 MG Chew chewable tablet  Commonly known as: ASA   Take 1 Tab by mouth every day.  Dose: 81 mg     atorvastatin 10 MG Tabs  Commonly known as: LIPITOR   Take 1 Tab by mouth every evening.  Dose: 10 mg     lisinopril 5 MG Tabs  Commonly known as: PRINIVIL   Take 1 Tab by mouth every day.  Dose: 5 mg        CHANGE how you take these medications      Instructions   sulfamethoxazole-trimethoprim 400-80 MG Tabs  What changed: additional instructions  Commonly known as: Bactrim   Doctor's comments: Start after Nitrofurantoin is finished.  Take 1 Tab by mouth every day.  Dose: 1 Tab        CONTINUE taking these medications      Instructions   metoprolol SR 25 MG Tb24  Commonly known as: TOPROL XL   Take 1 Tab by mouth every day.  Dose: 25 mg        STOP taking these medications    nitrofurantoin 100 MG Caps  Commonly known as: MACROBID            Allergies  No Known Allergies     DIET        Orders Placed This Encounter   Procedures   • Diet Order Cardiac       Standing Status:   Standing       Number of Occurrences:   1       Order  Specific Question:   Diet:       Answer:   Cardiac [6]         ACTIVITY  As tolerated.  Weight bearing as tolerated     CONSULTATIONS  No consultations were placed during this Admission     PROCEDURES  Cardiac ECHO bubble study on 10/30/2020  MRI brain on 10/30/2020      LABORATORY  Lab Results   Component Value Date    SODIUM 140 10/30/2020    POTASSIUM 3.6 10/30/2020    CHLORIDE 104 10/30/2020    CO2 25 10/30/2020    GLUCOSE 109 (H) 10/30/2020    BUN 11 10/30/2020    CREATININE 0.68 10/30/2020    CREATININE 0.8 04/17/2009        Lab Results   Component Value Date    WBC 7.0 10/30/2020    HEMOGLOBIN 13.0 10/30/2020    HEMATOCRIT 39.2 10/30/2020    PLATELETCT 221 10/30/2020        Total time of the discharge process exceeds 42 minutes.

## 2020-11-02 LAB — ALDOST SERPL-MCNC: 7.6 NG/DL

## 2020-11-06 LAB — RENIN PLAS-CCNC: 1 NG/ML/HR

## 2020-11-11 ENCOUNTER — HOSPITAL ENCOUNTER (OUTPATIENT)
Dept: LAB | Facility: MEDICAL CENTER | Age: 45
End: 2020-11-11
Attending: FAMILY MEDICINE
Payer: COMMERCIAL

## 2020-11-11 DIAGNOSIS — I10 ESSENTIAL HYPERTENSION: ICD-10-CM

## 2020-11-11 DIAGNOSIS — N20.0 NEPHROLITHIASIS: ICD-10-CM

## 2020-11-11 DIAGNOSIS — N39.0 RECURRENT UTI: ICD-10-CM

## 2020-11-11 DIAGNOSIS — E78.5 DYSLIPIDEMIA: ICD-10-CM

## 2020-11-11 DIAGNOSIS — N12 PYELONEPHRITIS OF LEFT KIDNEY: ICD-10-CM

## 2020-11-11 LAB
ALBUMIN SERPL BCP-MCNC: 4.1 G/DL (ref 3.2–4.9)
ALBUMIN/GLOB SERPL: 1.5 G/DL
ALP SERPL-CCNC: 92 U/L (ref 30–99)
ALT SERPL-CCNC: 22 U/L (ref 2–50)
ANION GAP SERPL CALC-SCNC: 9 MMOL/L (ref 7–16)
AST SERPL-CCNC: 16 U/L (ref 12–45)
BASOPHILS # BLD AUTO: 1.1 % (ref 0–1.8)
BASOPHILS # BLD: 0.07 K/UL (ref 0–0.12)
BILIRUB SERPL-MCNC: 0.4 MG/DL (ref 0.1–1.5)
BUN SERPL-MCNC: 16 MG/DL (ref 8–22)
CALCIUM SERPL-MCNC: 9.3 MG/DL (ref 8.5–10.5)
CHLORIDE SERPL-SCNC: 105 MMOL/L (ref 96–112)
CHOLEST SERPL-MCNC: 185 MG/DL (ref 100–199)
CO2 SERPL-SCNC: 25 MMOL/L (ref 20–33)
CREAT SERPL-MCNC: 0.82 MG/DL (ref 0.5–1.4)
EOSINOPHIL # BLD AUTO: 0.16 K/UL (ref 0–0.51)
EOSINOPHIL NFR BLD: 2.5 % (ref 0–6.9)
ERYTHROCYTE [DISTWIDTH] IN BLOOD BY AUTOMATED COUNT: 44 FL (ref 35.9–50)
FASTING STATUS PATIENT QL REPORTED: NORMAL
GLOBULIN SER CALC-MCNC: 2.8 G/DL (ref 1.9–3.5)
GLUCOSE SERPL-MCNC: 101 MG/DL (ref 65–99)
HCT VFR BLD AUTO: 42.1 % (ref 37–47)
HDLC SERPL-MCNC: 40 MG/DL
HGB BLD-MCNC: 13.6 G/DL (ref 12–16)
IMM GRANULOCYTES # BLD AUTO: 0.02 K/UL (ref 0–0.11)
IMM GRANULOCYTES NFR BLD AUTO: 0.3 % (ref 0–0.9)
LDLC SERPL CALC-MCNC: 123 MG/DL
LYMPHOCYTES # BLD AUTO: 2.29 K/UL (ref 1–4.8)
LYMPHOCYTES NFR BLD: 35.1 % (ref 22–41)
MCH RBC QN AUTO: 30.1 PG (ref 27–33)
MCHC RBC AUTO-ENTMCNC: 32.3 G/DL (ref 33.6–35)
MCV RBC AUTO: 93.1 FL (ref 81.4–97.8)
MONOCYTES # BLD AUTO: 0.43 K/UL (ref 0–0.85)
MONOCYTES NFR BLD AUTO: 6.6 % (ref 0–13.4)
NEUTROPHILS # BLD AUTO: 3.55 K/UL (ref 2–7.15)
NEUTROPHILS NFR BLD: 54.4 % (ref 44–72)
NRBC # BLD AUTO: 0 K/UL
NRBC BLD-RTO: 0 /100 WBC
PLATELET # BLD AUTO: 272 K/UL (ref 164–446)
PMV BLD AUTO: 10.1 FL (ref 9–12.9)
POTASSIUM SERPL-SCNC: 4.5 MMOL/L (ref 3.6–5.5)
PROT SERPL-MCNC: 6.9 G/DL (ref 6–8.2)
RBC # BLD AUTO: 4.52 M/UL (ref 4.2–5.4)
SODIUM SERPL-SCNC: 139 MMOL/L (ref 135–145)
TRIGL SERPL-MCNC: 109 MG/DL (ref 0–149)
TSH SERPL DL<=0.005 MIU/L-ACNC: 1.97 UIU/ML (ref 0.38–5.33)
WBC # BLD AUTO: 6.5 K/UL (ref 4.8–10.8)

## 2020-11-11 PROCEDURE — 84443 ASSAY THYROID STIM HORMONE: CPT

## 2020-11-11 PROCEDURE — 36415 COLL VENOUS BLD VENIPUNCTURE: CPT

## 2020-11-11 PROCEDURE — 85025 COMPLETE CBC W/AUTO DIFF WBC: CPT

## 2020-11-11 PROCEDURE — 80053 COMPREHEN METABOLIC PANEL: CPT

## 2020-11-11 PROCEDURE — 80061 LIPID PANEL: CPT

## 2020-11-17 ENCOUNTER — OFFICE VISIT (OUTPATIENT)
Dept: MEDICAL GROUP | Facility: PHYSICIAN GROUP | Age: 45
End: 2020-11-17
Payer: COMMERCIAL

## 2020-11-17 VITALS
HEART RATE: 88 BPM | WEIGHT: 252.87 LBS | HEIGHT: 67 IN | SYSTOLIC BLOOD PRESSURE: 110 MMHG | DIASTOLIC BLOOD PRESSURE: 80 MMHG | TEMPERATURE: 97.1 F | BODY MASS INDEX: 39.69 KG/M2 | OXYGEN SATURATION: 95 %

## 2020-11-17 DIAGNOSIS — R73.01 IMPAIRED FASTING BLOOD SUGAR: ICD-10-CM

## 2020-11-17 DIAGNOSIS — E78.5 DYSLIPIDEMIA: ICD-10-CM

## 2020-11-17 DIAGNOSIS — I16.0 HYPERTENSIVE URGENCY: ICD-10-CM

## 2020-11-17 PROCEDURE — 99214 OFFICE O/P EST MOD 30 MIN: CPT | Performed by: FAMILY MEDICINE

## 2020-11-17 RX ORDER — ATORVASTATIN CALCIUM 10 MG/1
10 TABLET, FILM COATED ORAL EVERY EVENING
Qty: 30 TAB | Refills: 5 | Status: SHIPPED | OUTPATIENT
Start: 2020-11-17 | End: 2021-02-16 | Stop reason: SDUPTHER

## 2020-11-17 RX ORDER — LISINOPRIL 5 MG/1
5 TABLET ORAL DAILY
Qty: 30 TAB | Refills: 5 | Status: SHIPPED | OUTPATIENT
Start: 2020-11-17 | End: 2021-07-29

## 2020-11-17 ASSESSMENT — FIBROSIS 4 INDEX: FIB4 SCORE: 0.56

## 2020-11-18 NOTE — PROGRESS NOTES
Subjective:      Leslie Nazario is a 45 y.o. female who presents with Hypertension            HPI     Patient returns for follow-up after recent hospitalization at [facility] for hypertensive urgency from 10/29-31/2020.  She had very elevated blood pressure readings in the 200s over 150s with slurring of speech and questionable aphasia.  She said her blood pressure started running high already when she went to see her urologist on 10/28/2020 in the 190s systolic and got even worse the day after when she was at work.  Work-up done in the hospital included CTA of the neck and CTA of the head which came back no evidence of any abnormal findings, MRI of the brain also without any acute findings.  She had an echocardiogram that showed ejection fraction of 65% mild LVH and no valvular abnormalities.  She was started on a second agent lisinopril 5 mg 1 tablet daily and kept her on metoprolol XL 25 mg daily.  Her blood pressure improved and normalized.  Her lipid panel came back mild elevation of the LDL cholesterol at 129, triglycerides high at 270 and HDL low at 35.  She was started on atorvastatin 10 mg 1 tablet at bedtime.  She was also started on aspirin 81 mg 1 tablet daily.  She said her blood pressure has been running good since she came home.  She said she continues to have headache across the frontal area and behind the right eye and the right parietal area.  She said her headache has been longstanding problem.  There is relief with taking Tylenol.    She is now taking a daily dose of Bactrim single strength for UTI prophylaxis because of history of recurrent UTI.  She was seen and evaluated by the urologist on 10/28/2020 and they plan to do biopsy of her urinary bladder as well as renal ultrasound to check for renal artery stenosis.    She did a follow-up blood work prior to this visit which is only about 10 days since she was started on atorvastatin.    Past medical history, past surgical  "history, family history reviewed-no changes    Social history reviewed-no changes    Allergies reviewed-no changes    Medications reviewed-no changes    ROS     As per HPI, the rest are negative.       Objective:     /80 (BP Location: Left arm, Patient Position: Sitting, BP Cuff Size: Adult)   Pulse 88   Temp 36.2 °C (97.1 °F) (Temporal)   Ht 1.702 m (5' 7\")   Wt 114.7 kg (252 lb 13.9 oz)   SpO2 95%   BMI 39.60 kg/m²      Physical Exam     Examined alert, awake, oriented, not in distress    Neck-supple, no lymphadenopathy, no thyromegaly  Lungs-clear to auscultation, no rales, no wheezes  Heart-regular rate and rhythm, no murmur  Extremities-no edema, clubbing, cyanosis       I reviewed hospital records    Hospital Outpatient Visit on 11/11/2020   Component Date Value Ref Range Status   • TSH 11/11/2020 1.970  0.380 - 5.330 uIU/mL Final    Comment: Please note new reference ranges effective 12/14/2017 10:00 AM  Pregnant Females, 1st Trimester  0.050-3.700  Pregnant Females, 2nd Trimester  0.310-4.350  Pregnant Females, 3rd Trimester  0.410-5.180     • Sodium 11/11/2020 139  135 - 145 mmol/L Final   • Potassium 11/11/2020 4.5  3.6 - 5.5 mmol/L Final   • Chloride 11/11/2020 105  96 - 112 mmol/L Final   • Co2 11/11/2020 25  20 - 33 mmol/L Final   • Anion Gap 11/11/2020 9.0  7.0 - 16.0 Final   • Glucose 11/11/2020 101* 65 - 99 mg/dL Final   • Bun 11/11/2020 16  8 - 22 mg/dL Final   • Creatinine 11/11/2020 0.82  0.50 - 1.40 mg/dL Final   • Calcium 11/11/2020 9.3  8.5 - 10.5 mg/dL Final   • AST(SGOT) 11/11/2020 16  12 - 45 U/L Final   • ALT(SGPT) 11/11/2020 22  2 - 50 U/L Final   • Alkaline Phosphatase 11/11/2020 92  30 - 99 U/L Final   • Total Bilirubin 11/11/2020 0.4  0.1 - 1.5 mg/dL Final   • Albumin 11/11/2020 4.1  3.2 - 4.9 g/dL Final   • Total Protein 11/11/2020 6.9  6.0 - 8.2 g/dL Final   • Globulin 11/11/2020 2.8  1.9 - 3.5 g/dL Final   • A-G Ratio 11/11/2020 1.5  g/dL Final   • WBC 11/11/2020 6.5  4.8 " - 10.8 K/uL Final   • RBC 11/11/2020 4.52  4.20 - 5.40 M/uL Final   • Hemoglobin 11/11/2020 13.6  12.0 - 16.0 g/dL Final   • Hematocrit 11/11/2020 42.1  37.0 - 47.0 % Final   • MCV 11/11/2020 93.1  81.4 - 97.8 fL Final   • MCH 11/11/2020 30.1  27.0 - 33.0 pg Final   • MCHC 11/11/2020 32.3* 33.6 - 35.0 g/dL Final   • RDW 11/11/2020 44.0  35.9 - 50.0 fL Final   • Platelet Count 11/11/2020 272  164 - 446 K/uL Final   • MPV 11/11/2020 10.1  9.0 - 12.9 fL Final   • Neutrophils-Polys 11/11/2020 54.40  44.00 - 72.00 % Final   • Lymphocytes 11/11/2020 35.10  22.00 - 41.00 % Final   • Monocytes 11/11/2020 6.60  0.00 - 13.40 % Final   • Eosinophils 11/11/2020 2.50  0.00 - 6.90 % Final   • Basophils 11/11/2020 1.10  0.00 - 1.80 % Final   • Immature Granulocytes 11/11/2020 0.30  0.00 - 0.90 % Final   • Nucleated RBC 11/11/2020 0.00  /100 WBC Final   • Neutrophils (Absolute) 11/11/2020 3.55  2.00 - 7.15 K/uL Final    Includes immature neutrophils, if present.   • Lymphs (Absolute) 11/11/2020 2.29  1.00 - 4.80 K/uL Final   • Monos (Absolute) 11/11/2020 0.43  0.00 - 0.85 K/uL Final   • Eos (Absolute) 11/11/2020 0.16  0.00 - 0.51 K/uL Final   • Baso (Absolute) 11/11/2020 0.07  0.00 - 0.12 K/uL Final   • Immature Granulocytes (abs) 11/11/2020 0.02  0.00 - 0.11 K/uL Final   • NRBC (Absolute) 11/11/2020 0.00  K/uL Final   • Cholesterol,Tot 11/11/2020 185  100 - 199 mg/dL Final   • Triglycerides 11/11/2020 109  0 - 149 mg/dL Final   • HDL 11/11/2020 40  >=40 mg/dL Final   • LDL 11/11/2020 123* <100 mg/dL Final   • Fasting Status 11/11/2020 Fasting   Final   • GFR If  11/11/2020 >60  >60 mL/min/1.73 m 2 Final   • GFR If Non  11/11/2020 >60  >60 mL/min/1.73 m 2 Final          Assessment/Plan:        1. Hypertensive urgency  She presented to the ER with very elevated blood pressure reading, slurred speech and questionable aphasia which resolved spontaneously.  Work-up did not show any stroke, aneurysm,  tumors in the brain.  Her blood pressure is now down to normal range on 2 agents metoprolol and lisinopril.  We will keep her on current medications.  She has additional work-up ordered by her urologist which is renal artery ultrasound and we will await for results.  Her kidney function has remained normal.  - Lipid Profile; Future  - Comp Metabolic Panel; Future  - HEMOGLOBIN A1C; Future  - lisinopril (PRINIVIL) 5 MG Tab; Take 1 Tab by mouth every day.  Dispense: 30 Tab; Refill: 5    2. Dyslipidemia  She was started on atorvastatin 10 mg in the hospital for mild dyslipidemia with mildly elevated calculated 10-year ASCVD risk 6.3%.  She did her lipid panel only about 10 days from the time she was started on cholesterol medication and there is already significant improvement of the triglycerides and HDL but the LDL is not significantly improved.  We will recheck lipid panel in 3 months.  - Lipid Profile; Future  - Comp Metabolic Panel; Future  - HEMOGLOBIN A1C; Future  - atorvastatin (LIPITOR) 10 MG Tab; Take 1 Tab by mouth every evening.  Dispense: 30 Tab; Refill: 5    3. Impaired fasting blood sugar  She has impaired fasting blood sugar with mild elevation of the fasting blood sugar of 101.  Advised to avoid sweets and decrease the carbs.  Advised regular exercises and weight loss.  Repeat blood work to check the fasting blood sugar and hemoglobin A1c in 3 months.  - Lipid Profile; Future  - Comp Metabolic Panel; Future  - HEMOGLOBIN A1C; Future    I will see her for follow-up in 3 months.      Please note that this dictation was created using voice recognition software. I have worked with consultants from the vendor as well as technical experts from [employer] to optimize the interface. I have made every reasonable attempt to correct obvious errors, but I expect that there are errors of grammar and possibly content I did not discover before finalizing the note.

## 2021-02-10 ENCOUNTER — TELEPHONE (OUTPATIENT)
Dept: MEDICAL GROUP | Facility: PHYSICIAN GROUP | Age: 46
End: 2021-02-10

## 2021-02-10 NOTE — TELEPHONE ENCOUNTER
ESTABLISHED PATIENT PRE-VISIT PLANNING     Patient was NOT contacted to complete PVP.     Note: Patient will not be contacted if there is no indication to call.     1.  Reviewed notes from the last few office visits within the medical group: Yes    2.  If any orders were placed at last visit or intended to be done for this visit (i.e. 6 mos follow-up), do we have Results/Consult Notes?         •  Labs - Labs ordered, NOT completed. Patient advised to complete prior to next appointment.  Note: If patient appointment is for lab review and patient did not complete labs, check with provider if OK to reschedule patient until labs completed.       •  Imaging - Imaging was not ordered at last office visit.       •  Referrals - No referrals were ordered at last office visit.    3. Is this appointment scheduled as a Hospital Follow-Up? No    4.  Immunizations were updated in Epic using Reconcile Outside Information activity? No    5.  Patient is due for the following Health Maintenance Topics:   Health Maintenance Due   Topic Date Due   • IMM DTaP/Tdap/Td Vaccine (1 - Tdap) 03/23/1994       - Patient is up-to-date on all Health Maintenance topics. No records have been requested at this time.    6.  AHA (Pulse8) form printed for Provider? N/A

## 2021-02-16 ENCOUNTER — OFFICE VISIT (OUTPATIENT)
Dept: MEDICAL GROUP | Facility: PHYSICIAN GROUP | Age: 46
End: 2021-02-16
Payer: COMMERCIAL

## 2021-02-16 VITALS
BODY MASS INDEX: 40.93 KG/M2 | WEIGHT: 260.8 LBS | OXYGEN SATURATION: 97 % | TEMPERATURE: 97.9 F | HEIGHT: 67 IN | HEART RATE: 85 BPM | SYSTOLIC BLOOD PRESSURE: 130 MMHG | DIASTOLIC BLOOD PRESSURE: 90 MMHG

## 2021-02-16 DIAGNOSIS — R73.01 IMPAIRED FASTING BLOOD SUGAR: ICD-10-CM

## 2021-02-16 DIAGNOSIS — E78.5 DYSLIPIDEMIA: ICD-10-CM

## 2021-02-16 DIAGNOSIS — N20.0 NEPHROLITHIASIS: ICD-10-CM

## 2021-02-16 DIAGNOSIS — E66.01 MORBID OBESITY WITH BMI OF 40.0-44.9, ADULT (HCC): ICD-10-CM

## 2021-02-16 DIAGNOSIS — I10 ESSENTIAL HYPERTENSION: ICD-10-CM

## 2021-02-16 DIAGNOSIS — N39.0 RECURRENT UTI: ICD-10-CM

## 2021-02-16 PROCEDURE — 99214 OFFICE O/P EST MOD 30 MIN: CPT | Performed by: FAMILY MEDICINE

## 2021-02-16 RX ORDER — ATORVASTATIN CALCIUM 10 MG/1
10 TABLET, FILM COATED ORAL EVERY EVENING
Qty: 90 TABLET | Refills: 1 | Status: SHIPPED | OUTPATIENT
Start: 2021-02-16 | End: 2021-08-14

## 2021-02-16 RX ORDER — ASPIRIN 81 MG/1
81 TABLET, CHEWABLE ORAL DAILY
Qty: 90 TABLET | Refills: 1 | Status: SHIPPED | OUTPATIENT
Start: 2021-02-16 | End: 2021-08-19

## 2021-02-16 RX ORDER — LISINOPRIL 10 MG/1
10 TABLET ORAL DAILY
Qty: 90 TABLET | Refills: 1 | Status: SHIPPED | OUTPATIENT
Start: 2021-02-16 | End: 2021-04-30 | Stop reason: SDUPTHER

## 2021-02-16 RX ORDER — METOPROLOL SUCCINATE 25 MG/1
25 TABLET, EXTENDED RELEASE ORAL DAILY
Qty: 90 TABLET | Refills: 1 | Status: SHIPPED | OUTPATIENT
Start: 2021-02-16 | End: 2021-08-14

## 2021-02-16 ASSESSMENT — FIBROSIS 4 INDEX: FIB4 SCORE: 0.56

## 2021-02-17 PROBLEM — Z87.442 HISTORY OF RENAL CALCULI: Status: ACTIVE | Noted: 2020-10-26

## 2021-02-17 PROBLEM — Z90.5 ABSENT KIDNEY: Status: ACTIVE | Noted: 2020-10-28

## 2021-02-17 NOTE — PROGRESS NOTES
Subjective:      Leslie Nazario is a 45 y.o. female who presents with Hypertension            HPI     Patient returns for follow-up of her medical problems.    In terms of her hypertension, she continues to take metoprolol XL 25 mg daily and lisinopril 5 mg daily without side effects.  Her blood pressure is now slightly elevated specifically the diastolic blood pressure.  She said she has not been checking her blood pressure at home.  She gained a lot of weight in the last 3 months total of 18 pounds.  She said she has been very good in watching her diet.  She has been avoiding sweets and is following a gluten-free diet.  She said she tries to stay active but has been walking with her daughter.  In spite of this she has not been able to lose weight.    We follow her for dyslipidemia and she continues to take atorvastatin without myalgias.    We also follow her for impaired fasting blood sugar and she is managing this by watching her diet.    I put her on single strength Bactrim 1 tablet daily for 3 months to manage her recurrent UTI back in October 2020.  She has already finished the 3-month course a month ago.  She has not had any recurrence of UTI during that time and after she finished antibiotics.    She has seen the urologist for recurrent nephrolithiasis.  She already had right kidney removed for the recurrent nephrolithiasis and recurrent UTI.  In October 2020 she had pyelonephritis of the left kidney with tiny calculi in the left ureter.  When she saw her urologist in November 2020 she had a cystoscopy that did not show any abnormal findings.  She was sent for follow-up ultrasound of the kidneys which she has not done yet.  Denies any urinary symptoms at this time.  She has not had any symptoms that would indicate recurrence of kidney stones or UTI.    Past medical history, past surgical history, family history reviewed-no changes    Social history reviewed-no changes    Allergies reviewed-no  "changes    Medications reviewed-no changes    ROS     As per HPI, the rest are negative.       Objective:     /90 (BP Location: Left arm, Patient Position: Sitting, BP Cuff Size: Adult)   Pulse 85   Temp 36.6 °C (97.9 °F) (Temporal)   Ht 1.702 m (5' 7\")   Wt 118 kg (260 lb 12.9 oz)   SpO2 97%   BMI 40.85 kg/m²      Physical Exam     Examined alert, awake, oriented, not in distress    Neck-supple, no lymphadenopathy, no thyromegaly  Lungs-clear to auscultation, no rales, no wheezes  Heart-regular rate and rhythm, no murmur  Extremities-no edema, clubbing, cyanosis    Hospital Outpatient Visit on 11/11/2020   Component Date Value Ref Range Status   • TSH 11/11/2020 1.970  0.380 - 5.330 uIU/mL Final    Comment: Please note new reference ranges effective 12/14/2017 10:00 AM  Pregnant Females, 1st Trimester  0.050-3.700  Pregnant Females, 2nd Trimester  0.310-4.350  Pregnant Females, 3rd Trimester  0.410-5.180     • Sodium 11/11/2020 139  135 - 145 mmol/L Final   • Potassium 11/11/2020 4.5  3.6 - 5.5 mmol/L Final   • Chloride 11/11/2020 105  96 - 112 mmol/L Final   • Co2 11/11/2020 25  20 - 33 mmol/L Final   • Anion Gap 11/11/2020 9.0  7.0 - 16.0 Final   • Glucose 11/11/2020 101* 65 - 99 mg/dL Final   • Bun 11/11/2020 16  8 - 22 mg/dL Final   • Creatinine 11/11/2020 0.82  0.50 - 1.40 mg/dL Final   • Calcium 11/11/2020 9.3  8.5 - 10.5 mg/dL Final   • AST(SGOT) 11/11/2020 16  12 - 45 U/L Final   • ALT(SGPT) 11/11/2020 22  2 - 50 U/L Final   • Alkaline Phosphatase 11/11/2020 92  30 - 99 U/L Final   • Total Bilirubin 11/11/2020 0.4  0.1 - 1.5 mg/dL Final   • Albumin 11/11/2020 4.1  3.2 - 4.9 g/dL Final   • Total Protein 11/11/2020 6.9  6.0 - 8.2 g/dL Final   • Globulin 11/11/2020 2.8  1.9 - 3.5 g/dL Final   • A-G Ratio 11/11/2020 1.5  g/dL Final   • WBC 11/11/2020 6.5  4.8 - 10.8 K/uL Final   • RBC 11/11/2020 4.52  4.20 - 5.40 M/uL Final   • Hemoglobin 11/11/2020 13.6  12.0 - 16.0 g/dL Final   • Hematocrit " 11/11/2020 42.1  37.0 - 47.0 % Final   • MCV 11/11/2020 93.1  81.4 - 97.8 fL Final   • MCH 11/11/2020 30.1  27.0 - 33.0 pg Final   • MCHC 11/11/2020 32.3* 33.6 - 35.0 g/dL Final   • RDW 11/11/2020 44.0  35.9 - 50.0 fL Final   • Platelet Count 11/11/2020 272  164 - 446 K/uL Final   • MPV 11/11/2020 10.1  9.0 - 12.9 fL Final   • Neutrophils-Polys 11/11/2020 54.40  44.00 - 72.00 % Final   • Lymphocytes 11/11/2020 35.10  22.00 - 41.00 % Final   • Monocytes 11/11/2020 6.60  0.00 - 13.40 % Final   • Eosinophils 11/11/2020 2.50  0.00 - 6.90 % Final   • Basophils 11/11/2020 1.10  0.00 - 1.80 % Final   • Immature Granulocytes 11/11/2020 0.30  0.00 - 0.90 % Final   • Nucleated RBC 11/11/2020 0.00  /100 WBC Final   • Neutrophils (Absolute) 11/11/2020 3.55  2.00 - 7.15 K/uL Final    Includes immature neutrophils, if present.   • Lymphs (Absolute) 11/11/2020 2.29  1.00 - 4.80 K/uL Final   • Monos (Absolute) 11/11/2020 0.43  0.00 - 0.85 K/uL Final   • Eos (Absolute) 11/11/2020 0.16  0.00 - 0.51 K/uL Final   • Baso (Absolute) 11/11/2020 0.07  0.00 - 0.12 K/uL Final   • Immature Granulocytes (abs) 11/11/2020 0.02  0.00 - 0.11 K/uL Final   • NRBC (Absolute) 11/11/2020 0.00  K/uL Final   • Cholesterol,Tot 11/11/2020 185  100 - 199 mg/dL Final   • Triglycerides 11/11/2020 109  0 - 149 mg/dL Final   • HDL 11/11/2020 40  >=40 mg/dL Final   • LDL 11/11/2020 123* <100 mg/dL Final   • Fasting Status 11/11/2020 Fasting   Final   • GFR If  11/11/2020 >60  >60 mL/min/1.73 m 2 Final   • GFR If Non  11/11/2020 >60  >60 mL/min/1.73 m 2 Final                 Assessment/Plan:        1. Essential hypertension  Blood pressure is now slightly elevated specifically the systolic blood pressure.  We will increase lisinopril dose to 10 mg daily.  We will keep her on metoprolol XL 25 mg daily.  Monitor blood pressure at home and keep a record.  Advised exercise, watching the diet in terms of salt intake, and weight loss.  I  will reevaluate in 3 months.  - metoprolol SR (TOPROL XL) 25 MG TABLET SR 24 HR; Take 1 tablet by mouth every day.  Dispense: 90 tablet; Refill: 1  - lisinopril (PRINIVIL) 10 MG Tab; Take 1 tablet by mouth every day.  Dispense: 90 tablet; Refill: 1    2. Dyslipidemia  Continue atorvastatin.  - atorvastatin (LIPITOR) 10 MG Tab; Take 1 tablet by mouth every evening.  Dispense: 90 tablet; Refill: 1    3. Impaired fasting blood sugar  Continue watching the diet in terms of avoidance of sweets and decreasing the carbs.    4. Recurrent UTI  She finished 3 months of daily Bactrim single strength.  She has been off the medication without any recurrence of UTI.  We will continue to follow.    5. Nephrolithiasis  She has not had any symptoms of nephrolithiasis.  She was supposed to have a follow-up ultrasound ordered by her urologist and advised to get this set up.  Advised to observe adequate amounts of hydration.    6. Morbid obesity with BMI of 40.0-44.9, adult (HCC)  We discussed referral to our physician managed weight loss program and she agrees to proceed.  - REFERRAL TO [facility] IMPROVEMENT PROGRAMS (HIP)    Follow-up in 3 months.      Please note that this dictation was created using voice recognition software. I have worked with consultants from the vendor as well as technical experts from [facility] to optimize the interface. I have made every reasonable attempt to correct obvious errors, but I expect that there are errors of grammar and possibly content I did not discover before finalizing the note.

## 2021-04-09 ENCOUNTER — HOSPITAL ENCOUNTER (OUTPATIENT)
Facility: MEDICAL CENTER | Age: 46
End: 2021-04-09
Attending: NURSE PRACTITIONER
Payer: COMMERCIAL

## 2021-04-09 ENCOUNTER — OFFICE VISIT (OUTPATIENT)
Dept: URGENT CARE | Facility: CLINIC | Age: 46
End: 2021-04-09
Payer: COMMERCIAL

## 2021-04-09 VITALS
TEMPERATURE: 98.3 F | HEIGHT: 67 IN | BODY MASS INDEX: 41.44 KG/M2 | HEART RATE: 92 BPM | WEIGHT: 264 LBS | RESPIRATION RATE: 14 BRPM | SYSTOLIC BLOOD PRESSURE: 128 MMHG | DIASTOLIC BLOOD PRESSURE: 76 MMHG | OXYGEN SATURATION: 100 %

## 2021-04-09 DIAGNOSIS — N39.0 COMPLICATED URINARY TRACT INFECTION: ICD-10-CM

## 2021-04-09 LAB

## 2021-04-09 PROCEDURE — 87077 CULTURE AEROBIC IDENTIFY: CPT

## 2021-04-09 PROCEDURE — 87186 SC STD MICRODIL/AGAR DIL: CPT

## 2021-04-09 PROCEDURE — 87086 URINE CULTURE/COLONY COUNT: CPT

## 2021-04-09 PROCEDURE — 81002 URINALYSIS NONAUTO W/O SCOPE: CPT | Performed by: NURSE PRACTITIONER

## 2021-04-09 PROCEDURE — 99214 OFFICE O/P EST MOD 30 MIN: CPT | Mod: 25 | Performed by: NURSE PRACTITIONER

## 2021-04-09 RX ORDER — CIPROFLOXACIN 500 MG/1
500 TABLET, FILM COATED ORAL 2 TIMES DAILY
Qty: 14 TABLET | Refills: 0 | Status: SHIPPED | OUTPATIENT
Start: 2021-04-09 | End: 2021-04-16

## 2021-04-09 ASSESSMENT — FIBROSIS 4 INDEX: FIB4 SCORE: 0.58

## 2021-04-10 DIAGNOSIS — N39.0 COMPLICATED URINARY TRACT INFECTION: ICD-10-CM

## 2021-04-10 NOTE — PROGRESS NOTES
Chief Complaint   Patient presents with   • UTI     x 2 days with pelvic pain, urgency and felt like she may have had a fever.  Hx of UTI's.        HISTORY OF PRESENT ILLNESS: Patient is a pleasant 46 y.o. female who presents today due to two days of low pelvic and left-sided flank pressure.  She has a history of recurrent urinary tract infections, including pyelonephritis, this feels similar.  She was hospitalized in October for pyelonephritis.  Furthermore, notes history of renal calculi, resulting in right-sided nephrectomy, her symptoms do not feel like a stone is involved.  She also admits to malaise, tactile fever, and fatigue today but also received her second Covid vaccination.  She has not tried any medication for symptom relief.    Patient Active Problem List    Diagnosis Date Noted   • Hypertensive urgency 10/30/2020   • Complicated urinary tract infection 10/06/2020   • Recurrent UTI 10/30/2020   • Obesity (BMI 35.0-39.9 without comorbidity) 09/27/2017   • TIA (transient ischemic attack) 10/31/2020   • Absent kidney 10/28/2020   • History of renal calculi 10/26/2020   • Pain in joint, pelvic region and thigh 12/18/2014   • HTN (hypertension) 02/20/2014   • Abdominal adhesions 01/27/2014   • Other specified disorder of gallbladder 12/11/2013   • Hypertriglyceridemia 03/20/2013   • Depression    • HTN (hypertension)        Allergies:Patient has no known allergies.    Current Outpatient Medications Ordered in Epic   Medication Sig Dispense Refill   • ciprofloxacin (CIPRO) 500 MG Tab Take 1 tablet by mouth 2 times a day for 7 days. 14 tablet 0   • aspirin (ASA) 81 MG Chew Tab chewable tablet Chew 1 tablet every day. 90 tablet 1   • metoprolol SR (TOPROL XL) 25 MG TABLET SR 24 HR Take 1 tablet by mouth every day. 90 tablet 1   • atorvastatin (LIPITOR) 10 MG Tab Take 1 tablet by mouth every evening. 90 tablet 1   • lisinopril (PRINIVIL) 10 MG Tab Take 1 tablet by mouth every day. 90 tablet 1   • lisinopril  "(PRINIVIL) 5 MG Tab Take 1 Tab by mouth every day. 30 Tab 5     Current Facility-Administered Medications Ordered in Epic   Medication Dose Route Frequency Provider Last Rate Last Admin   • cefTRIAXone (ROCEPHIN) 1 g, lidocaine (XYLOCAINE) 1 % 3.6 mL for IM use  1 g Intramuscular Once VICENTE DejesusPMELLY           Past Medical History:   Diagnosis Date   • Depression    • HTN     resolved 8/10   • Pain 01-24-14    abd., 0/10   • Pain     right hip   • Renal disorder     right nephrectomy 2010   • S/p nephrectomy 2010    right removed       Social History     Tobacco Use   • Smoking status: Never Smoker   • Smokeless tobacco: Never Used   Substance Use Topics   • Alcohol use: Yes     Alcohol/week: 0.0 oz     Comment: 2 per month   • Drug use: No       Family Status   Relation Name Status   • Mo  Alive   • Fa  Alive   • Bro  Alive   • Sis  Alive   • Sis  Alive     Family History   Problem Relation Age of Onset   • Hypertension Mother    • Hypertension Father    • Hypertension Brother    • Genitourinary () Problems Brother         kidney stones       ROS:  Review of Systems   Constitutional: Positive for fever, malaise/fatigue.   HENT: Negative for ear pain, nosebleeds, congestion, sore throat and neck pain.    Eyes: Negative for vision changes.   Neuro: Negative for headache, sensory changes, weakness, seizure, LOC.   Cardiovascular: Negative for chest pain, palpitations, orthopnea and leg swelling.   Respiratory: Negative for cough, sputum production, shortness of breath and wheezing.   Gastrointestinal: Positive for lower abdominal pressure. Negative for abdominal pain, nausea, vomiting or diarrhea.   Genitourinary: Positive for low abdominal pressure, flank pressure.  Negative for dysuria, urgency and frequency (this is normal for her with her infections).  Skin: Negative for rash, diaphoresis.     Exam:  /76   Pulse 92   Temp 36.8 °C (98.3 °F) (Temporal)   Resp 14   Ht 1.702 m (5' 7\")   Wt 120 kg " (264 lb)   SpO2 100%   General: well-nourished, well-developed female in NAD  Head: normocephalic, atraumatic  Eyes: PERRLA, no conjunctival injection, acuity grossly intact, lids normal.  Lymph: no cervical adenopathy. No supraclavicular adenopathy.   Neuro: alert and oriented. Cranial nerves 1-12 grossly intact. No sensory deficit.   Cardiovascular: regular rate and rhythm. No edema.  Pulmonary: no distress. Chest is symmetrical with respiration, no wheezes, crackles, or rhonchi.   Abdomen: soft, suprapubic tenderness, no guarding, no hepatosplenomegaly.  Left CVA tenderness.   Musculoskeletal: no clubbing, appropriate muscle tone, gait is stable.  Skin: warm, dry, intact, no clubbing, no cyanosis, no rashes.   Psych: appropriate mood, affect, judgement.       POC urine positive for leuks      Assessment/Plan:  1. Complicated urinary tract infection  cefTRIAXone (ROCEPHIN) 1 g, lidocaine (XYLOCAINE) 1 % 3.6 mL for IM use    ciprofloxacin (CIPRO) 500 MG Tab    URINE CULTURE(NEW)    POCT Urinalysis       The patient is a pleasant, well-appearing 46-year-old female who presents the urgent care today with urinary symptoms, concern for early pyelonephritis versus complicated urinary tract infection.  She is given Rocephin in clinic, tolerated well.  She will be treated as an outpatient with Cipro, potential side effects of medication discussed including black box warning.  She is instructed to increase her fluid intake, urine is sent for culture.  STRICT ER precautions advised.  Supportive care, differential diagnoses, and indications for immediate follow-up discussed with patient.   Pathogenesis of diagnosis discussed including typical length and natural progression.   Instructed to return to clinic or nearest emergency department for any change in condition, further concerns, or worsening of symptoms.  Patient states understanding of the plan of care and discharge instructions.  Instructed to make an appointment,  for follow up, with her primary care provider.  Previous clinic and hospital visit encounters reviewed and considered in medical decision making today.         Please note that this dictation was created using voice recognition software. I have made every reasonable attempt to correct obvious errors, but I expect that there are errors of grammar and possibly content that I did not discover before finalizing the note.      ELZA Dejesus.

## 2021-05-28 ENCOUNTER — HOSPITAL ENCOUNTER (OUTPATIENT)
Facility: MEDICAL CENTER | Age: 46
End: 2021-05-28
Attending: PHYSICIAN ASSISTANT
Payer: COMMERCIAL

## 2021-05-28 ENCOUNTER — OFFICE VISIT (OUTPATIENT)
Dept: URGENT CARE | Facility: PHYSICIAN GROUP | Age: 46
End: 2021-05-28
Payer: COMMERCIAL

## 2021-05-28 VITALS
DIASTOLIC BLOOD PRESSURE: 92 MMHG | HEIGHT: 67 IN | OXYGEN SATURATION: 96 % | RESPIRATION RATE: 18 BRPM | TEMPERATURE: 98 F | BODY MASS INDEX: 40.81 KG/M2 | SYSTOLIC BLOOD PRESSURE: 120 MMHG | HEART RATE: 88 BPM | WEIGHT: 260 LBS

## 2021-05-28 DIAGNOSIS — Z90.5 SINGLE KIDNEY: ICD-10-CM

## 2021-05-28 DIAGNOSIS — N39.0 COMPLICATED UTI (URINARY TRACT INFECTION): ICD-10-CM

## 2021-05-28 DIAGNOSIS — N30.00 ACUTE CYSTITIS WITHOUT HEMATURIA: ICD-10-CM

## 2021-05-28 DIAGNOSIS — N30.00 ACUTE CYSTITIS WITHOUT HEMATURIA: Primary | ICD-10-CM

## 2021-05-28 LAB
APPEARANCE UR: CLEAR
BILIRUB UR STRIP-MCNC: NEGATIVE MG/DL
COLOR UR AUTO: YELLOW
GLUCOSE UR STRIP.AUTO-MCNC: NEGATIVE MG/DL
KETONES UR STRIP.AUTO-MCNC: NEGATIVE MG/DL
LEUKOCYTE ESTERASE UR QL STRIP.AUTO: NORMAL
NITRITE UR QL STRIP.AUTO: POSITIVE
PH UR STRIP.AUTO: 5.5 [PH] (ref 5–8)
PROT UR QL STRIP: NEGATIVE MG/DL
RBC UR QL AUTO: NEGATIVE
SP GR UR STRIP.AUTO: 1.02
UROBILINOGEN UR STRIP-MCNC: 0.2 MG/DL

## 2021-05-28 PROCEDURE — 87186 SC STD MICRODIL/AGAR DIL: CPT

## 2021-05-28 PROCEDURE — 87077 CULTURE AEROBIC IDENTIFY: CPT | Mod: 91

## 2021-05-28 PROCEDURE — 81002 URINALYSIS NONAUTO W/O SCOPE: CPT | Performed by: PHYSICIAN ASSISTANT

## 2021-05-28 PROCEDURE — 99214 OFFICE O/P EST MOD 30 MIN: CPT | Performed by: PHYSICIAN ASSISTANT

## 2021-05-28 PROCEDURE — 87086 URINE CULTURE/COLONY COUNT: CPT

## 2021-05-28 RX ORDER — CIPROFLOXACIN 500 MG/1
500 TABLET, FILM COATED ORAL EVERY 12 HOURS
Qty: 14 TABLET | Refills: 0 | Status: SHIPPED | OUTPATIENT
Start: 2021-05-28 | End: 2021-06-04

## 2021-05-28 ASSESSMENT — FIBROSIS 4 INDEX: FIB4 SCORE: 0.58

## 2021-05-28 ASSESSMENT — ENCOUNTER SYMPTOMS: FLANK PAIN: 0

## 2021-05-28 NOTE — PROGRESS NOTES
Subjective:   Leslie Nazario is a 46 y.o. female who presents for Abdominal Pain (x1 day, pt states lower abdominal pain, pain when urinating)        Dysuria   This is a new problem. The current episode started today. The problem occurs every urination. The quality of the pain is described as burning and aching. There has been no fever. There is a history of pyelonephritis. Associated symptoms include frequency, hematuria, hesitancy and urgency. Pertinent negatives include no flank pain. She has tried nothing for the symptoms. Her past medical history is significant for recurrent UTIs and a single kidney.     Review of Systems   Genitourinary: Positive for dysuria, frequency, hematuria, hesitancy and urgency. Negative for flank pain.       PMH:  has a past medical history of Depression, HTN, Pain (01-24-14), Pain, Renal disorder, and S/p nephrectomy (2010).  MEDS:   Current Outpatient Medications:   •  ciprofloxacin (CIPRO) 500 MG Tab, Take 1 tablet by mouth every 12 hours for 7 days., Disp: 14 tablet, Rfl: 0  •  lisinopril (PRINIVIL) 10 MG Tab, Take 1 tablet by mouth every day., Disp: 90 tablet, Rfl: 1  •  aspirin (ASA) 81 MG Chew Tab chewable tablet, Chew 1 tablet every day., Disp: 90 tablet, Rfl: 1  •  metoprolol SR (TOPROL XL) 25 MG TABLET SR 24 HR, Take 1 tablet by mouth every day., Disp: 90 tablet, Rfl: 1  •  atorvastatin (LIPITOR) 10 MG Tab, Take 1 tablet by mouth every evening., Disp: 90 tablet, Rfl: 1  •  lisinopril (PRINIVIL) 5 MG Tab, Take 1 Tab by mouth every day., Disp: 30 Tab, Rfl: 5  ALLERGIES: No Known Allergies  SURGHX:   Past Surgical History:   Procedure Laterality Date   • HIP ARTHROSCOPY  12/18/2014    Performed by Benji Lott M.D. at [facility]   • FEMORAL NECK OSTEOPLASTY  12/18/2014    Performed by Benji Lott M.D. at [facility]   • ACETABULAR OSTEOPLASTY  12/18/2014    Performed by Benji Lott M.D. at [facility]  "  • SYNOVECTOMY  12/18/2014    Performed by Benji Lott M.D. at [facility]ERY [facility]   • LAPAROSCOPY  1/27/2014    Performed by Ihsan Dior M.D. at [facility]   • LYSIS ADHESIONS GENERAL  1/27/2014    Performed by Ihsan Dior M.D. at SUR[facility]   • CASSANDRA BY LAPAROSCOPY  12/11/2013    Performed by Ihsan Dior M.D. at [facility]   • CHOLECYSTECTOMY  2013   • NEPHRECTOMY LAPAROSCOPIC  2010    Right- Performed by NINA CORREA at SUR[facility]ERY [facility]   • PB NEPHRECTOMY  2010   • CYSTOSCOPY STENT PLACEMENT  8/11/2009    Performed by KIMBERLY PALACIOS at SUR[facility]   • URETEROSCOPY  8/11/2009    Performed by KIMBERLY PALACIOS at [facility]   • STONE RETRIEVAL  8/11/2009    Performed by KIMBERLY PALACIOS at [facility]   • STENT REMOVAL  8/11/2009    Performed by KIMBERLY PALACIOS at [facility]   • LAPAROTOMY  2009    bladder and ureter reconstruction   • URETERAL REIMPLANTATION  4/23/08    Performed by KIMBERLY PALACIOS at [facility]   • STENT PLACEMENT  4/23/08    Performed by KIMBERLY PALACIOS at [facility]   • LYSIS ADHESIONS GENERAL  5/06   • ABDOMINAL HYSTERECTOMY TOTAL  2005    Hysterectomy, Total Abdominal   • LAPAROTOMY  2005   • TONSILLECTOMY  2004   • APPENDECTOMY  1991   • IR-URETERAL STENT ANTEGRADE      R side   • OTHER  [id number]    \"multiple procedures prior to nephrectom , stent placement/ nephrostomy tube    • URETEROSCOPY      partial removal of ureter with reimplantation     SOCHX:  reports that she has never smoked. She has never used smokeless tobacco. She reports current alcohol use. She reports that she does not use drugs.  Family History   Problem Relation Age of Onset   • Hypertension Mother    • Hypertension Father    • Hypertension Brother    • Genitourinary () Problems Brother         kidney stones        Objective:   /92   Pulse 88 " "  Temp 36.7 °C (98 °F) (Temporal)   Resp 18   Ht 1.702 m (5' 7\")   Wt 118 kg (260 lb)   SpO2 96%   BMI 40.72 kg/m²     Physical Exam  Vitals reviewed.   Constitutional:       General: She is not in acute distress.     Appearance: She is well-developed.   HENT:      Head: Normocephalic and atraumatic.      Right Ear: External ear normal.      Left Ear: External ear normal.   Neck:      Trachea: No tracheal deviation.   Pulmonary:      Effort: Pulmonary effort is normal.   Abdominal:      General: There is no distension.      Palpations: Abdomen is soft.      Tenderness: There is abdominal tenderness. There is left CVA tenderness. There is no right CVA tenderness.   Skin:     General: Skin is warm and dry.      Capillary Refill: Capillary refill takes less than 2 seconds.   Neurological:      Mental Status: She is alert and oriented to person, place, and time.   Psychiatric:         Mood and Affect: Mood normal.         Behavior: Behavior normal.           Assessment/Plan:     1. Acute cystitis without hematuria  Urine Culture    POCT Urinalysis    ciprofloxacin (CIPRO) 500 MG Tab    cefTRIAXone (ROCEPHIN) 1 g, lidocaine (XYLOCAINE) 1 % 3.6 mL for IM use   2. Single kidney     3. Complicated UTI (urinary tract infection)       UA: Positive leuks, nitrite    Supportive care reviewed.  Urine culture obtained.  She will be notified of final urine culture results or not susceptible to antibiotic prescribed at today's visit.    Follow-up with primary care provider. If symptoms worsen or persist patient can return to clinic for reevaluation.  Red flags and strict emergency room precautions discussed. Side effects of medication discussed. Patient confirmed understanding of information.    Please note that this dictation was created using voice recognition software. I have made every reasonable attempt to correct obvious errors, but I expect that there are errors of grammar and possibly content that I did not discover " before finalizing the note.

## 2021-07-22 ENCOUNTER — HOSPITAL ENCOUNTER (OUTPATIENT)
Dept: LAB | Facility: MEDICAL CENTER | Age: 46
End: 2021-07-22
Attending: PHYSICIAN ASSISTANT
Payer: COMMERCIAL

## 2021-07-22 ENCOUNTER — HOSPITAL ENCOUNTER (OUTPATIENT)
Dept: RADIOLOGY | Facility: MEDICAL CENTER | Age: 46
End: 2021-07-22
Attending: PHYSICIAN ASSISTANT
Payer: COMMERCIAL

## 2021-07-22 ENCOUNTER — OFFICE VISIT (OUTPATIENT)
Dept: URGENT CARE | Facility: PHYSICIAN GROUP | Age: 46
End: 2021-07-22
Payer: COMMERCIAL

## 2021-07-22 VITALS
BODY MASS INDEX: 40.81 KG/M2 | HEIGHT: 67 IN | HEART RATE: 90 BPM | SYSTOLIC BLOOD PRESSURE: 124 MMHG | OXYGEN SATURATION: 100 % | WEIGHT: 260 LBS | RESPIRATION RATE: 18 BRPM | TEMPERATURE: 97.4 F | DIASTOLIC BLOOD PRESSURE: 82 MMHG

## 2021-07-22 DIAGNOSIS — R10.9 LEFT FLANK PAIN: ICD-10-CM

## 2021-07-22 DIAGNOSIS — Z90.5 SINGLE KIDNEY: ICD-10-CM

## 2021-07-22 LAB
ALBUMIN SERPL BCP-MCNC: 4.3 G/DL (ref 3.2–4.9)
ALBUMIN/GLOB SERPL: 1.4 G/DL
ALP SERPL-CCNC: 77 U/L (ref 30–99)
ALT SERPL-CCNC: 14 U/L (ref 2–50)
ANION GAP SERPL CALC-SCNC: 12 MMOL/L (ref 7–16)
APPEARANCE UR: CLEAR
AST SERPL-CCNC: 17 U/L (ref 12–45)
BASOPHILS # BLD AUTO: 0.8 % (ref 0–1.8)
BASOPHILS # BLD: 0.06 K/UL (ref 0–0.12)
BILIRUB SERPL-MCNC: 0.6 MG/DL (ref 0.1–1.5)
BILIRUB UR STRIP-MCNC: NORMAL MG/DL
BUN SERPL-MCNC: 12 MG/DL (ref 8–22)
CALCIUM SERPL-MCNC: 9.5 MG/DL (ref 8.5–10.5)
CHLORIDE SERPL-SCNC: 104 MMOL/L (ref 96–112)
CO2 SERPL-SCNC: 24 MMOL/L (ref 20–33)
COLOR UR AUTO: YELLOW
CREAT SERPL-MCNC: 0.84 MG/DL (ref 0.5–1.4)
EOSINOPHIL # BLD AUTO: 0.1 K/UL (ref 0–0.51)
EOSINOPHIL NFR BLD: 1.4 % (ref 0–6.9)
ERYTHROCYTE [DISTWIDTH] IN BLOOD BY AUTOMATED COUNT: 42.5 FL (ref 35.9–50)
GLOBULIN SER CALC-MCNC: 3.1 G/DL (ref 1.9–3.5)
GLUCOSE SERPL-MCNC: 97 MG/DL (ref 65–99)
GLUCOSE UR STRIP.AUTO-MCNC: NORMAL MG/DL
HCT VFR BLD AUTO: 42.1 % (ref 37–47)
HGB BLD-MCNC: 14 G/DL (ref 12–16)
IMM GRANULOCYTES # BLD AUTO: 0.03 K/UL (ref 0–0.11)
IMM GRANULOCYTES NFR BLD AUTO: 0.4 % (ref 0–0.9)
INT CON NEG: NORMAL
INT CON POS: NORMAL
KETONES UR STRIP.AUTO-MCNC: NORMAL MG/DL
LEUKOCYTE ESTERASE UR QL STRIP.AUTO: NORMAL
LYMPHOCYTES # BLD AUTO: 2.6 K/UL (ref 1–4.8)
LYMPHOCYTES NFR BLD: 36.7 % (ref 22–41)
MCH RBC QN AUTO: 30 PG (ref 27–33)
MCHC RBC AUTO-ENTMCNC: 33.3 G/DL (ref 33.6–35)
MCV RBC AUTO: 90.3 FL (ref 81.4–97.8)
MONOCYTES # BLD AUTO: 0.46 K/UL (ref 0–0.85)
MONOCYTES NFR BLD AUTO: 6.5 % (ref 0–13.4)
NEUTROPHILS # BLD AUTO: 3.84 K/UL (ref 2–7.15)
NEUTROPHILS NFR BLD: 54.2 % (ref 44–72)
NITRITE UR QL STRIP.AUTO: NORMAL
NRBC # BLD AUTO: 0 K/UL
NRBC BLD-RTO: 0 /100 WBC
PH UR STRIP.AUTO: 6.5 [PH] (ref 5–8)
PLATELET # BLD AUTO: 247 K/UL (ref 164–446)
PMV BLD AUTO: 10 FL (ref 9–12.9)
POC URINE PREGNANCY TEST: NEGATIVE
POTASSIUM SERPL-SCNC: 4.3 MMOL/L (ref 3.6–5.5)
PROT SERPL-MCNC: 7.4 G/DL (ref 6–8.2)
PROT UR QL STRIP: NORMAL MG/DL
RBC # BLD AUTO: 4.66 M/UL (ref 4.2–5.4)
RBC UR QL AUTO: NORMAL
SODIUM SERPL-SCNC: 140 MMOL/L (ref 135–145)
SP GR UR STRIP.AUTO: 1.02
UROBILINOGEN UR STRIP-MCNC: 0.2 MG/DL
WBC # BLD AUTO: 7.1 K/UL (ref 4.8–10.8)

## 2021-07-22 PROCEDURE — 87086 URINE CULTURE/COLONY COUNT: CPT

## 2021-07-22 PROCEDURE — 36415 COLL VENOUS BLD VENIPUNCTURE: CPT

## 2021-07-22 PROCEDURE — 74176 CT ABD & PELVIS W/O CONTRAST: CPT

## 2021-07-22 PROCEDURE — 81025 URINE PREGNANCY TEST: CPT | Performed by: PHYSICIAN ASSISTANT

## 2021-07-22 PROCEDURE — 80053 COMPREHEN METABOLIC PANEL: CPT

## 2021-07-22 PROCEDURE — 85025 COMPLETE CBC W/AUTO DIFF WBC: CPT

## 2021-07-22 PROCEDURE — 99214 OFFICE O/P EST MOD 30 MIN: CPT | Performed by: PHYSICIAN ASSISTANT

## 2021-07-22 PROCEDURE — 81002 URINALYSIS NONAUTO W/O SCOPE: CPT | Performed by: PHYSICIAN ASSISTANT

## 2021-07-22 ASSESSMENT — ENCOUNTER SYMPTOMS
VOMITING: 0
FEVER: 0
NAUSEA: 0
FLANK PAIN: 1
ABDOMINAL PAIN: 0
CHILLS: 0

## 2021-07-22 ASSESSMENT — FIBROSIS 4 INDEX: FIB4 SCORE: 0.58

## 2021-07-22 NOTE — PROGRESS NOTES
Subjective:   Leslie Nazario is a 46 y.o. female who presents today with   Chief Complaint   Patient presents with   • Dysuria     kidney and back pain on the left side, pain is getting worse,  x1 day.        Other  This is a new problem. The current episode started today. The problem occurs constantly. The problem has been unchanged. Pertinent negatives include no abdominal pain, chills, fever, nausea or vomiting. Nothing aggravates the symptoms. She has tried acetaminophen for the symptoms. The treatment provided mild relief.     Patient states it feels more consistent with kidney stone she has had in the past rather than kidney infections.  Patient has 1 kidney on the left side.  She states she had the right kidney removed as it was producing too many stones but since 2015 the left side has started producing lots of stones as well.  PMH:  has a past medical history of Depression, HTN, Pain (01-24-14), Pain, Renal disorder, and S/p nephrectomy (2010).  MEDS:   Current Outpatient Medications:   •  lisinopril (PRINIVIL) 10 MG Tab, Take 1 tablet by mouth every day., Disp: 90 tablet, Rfl: 1  •  aspirin (ASA) 81 MG Chew Tab chewable tablet, Chew 1 tablet every day., Disp: 90 tablet, Rfl: 1  •  metoprolol SR (TOPROL XL) 25 MG TABLET SR 24 HR, Take 1 tablet by mouth every day., Disp: 90 tablet, Rfl: 1  •  atorvastatin (LIPITOR) 10 MG Tab, Take 1 tablet by mouth every evening., Disp: 90 tablet, Rfl: 1  •  lisinopril (PRINIVIL) 5 MG Tab, Take 1 Tab by mouth every day., Disp: 30 Tab, Rfl: 5  ALLERGIES: No Known Allergies  SURGHX:   Past Surgical History:   Procedure Laterality Date   • HIP ARTHROSCOPY  12/18/2014    Performed by Benji Lott M.D. at [facility]   • FEMORAL NECK OSTEOPLASTY  12/18/2014    Performed by Benji Lott M.D. at [facility]   • ACETABULAR OSTEOPLASTY  12/18/2014    Performed by Benji Lott M.D. at [facility]   •  "SYNOVECTOMY  12/18/2014    Performed by Benji Lott M.D. at SUR[facility]   • LAPAROSCOPY  1/27/2014    Performed by Ihsan Dior M.D. at SUR[facility]   • LYSIS ADHESIONS GENERAL  1/27/2014    Performed by Ihsan Dior M.D. at SUR[facility]   • CASSANDRA BY LAPAROSCOPY  12/11/2013    Performed by Ihsan Dior M.D. at SUR[facility]   • CHOLECYSTECTOMY  2013   • NEPHRECTOMY LAPAROSCOPIC  2010    Right- Performed by NINA CORREA at [facility]   • PB NEPHRECTOMY  2010   • CYSTOSCOPY STENT PLACEMENT  8/11/2009    Performed by KIMBERLY PALACIOS at [facility]   • URETEROSCOPY  8/11/2009    Performed by KIMBERLY PALACIOS at [facility]   • STONE RETRIEVAL  8/11/2009    Performed by KIMBERLY PALACIOS at [facility]   • STENT REMOVAL  8/11/2009    Performed by KIMBERLY PALACIOS at [facility]   • LAPAROTOMY  2009    bladder and ureter reconstruction   • URETERAL REIMPLANTATION  4/23/08    Performed by KIMBERLY PALACIOS at [facility]   • STENT PLACEMENT  4/23/08    Performed by KIMBERLY PALACIOS at [facility]   • LYSIS ADHESIONS GENERAL  5/06   • ABDOMINAL HYSTERECTOMY TOTAL  2005    Hysterectomy, Total Abdominal   • LAPAROTOMY  2005   • TONSILLECTOMY  2004   • APPENDECTOMY  1991   • IR-URETERAL STENT ANTEGRADE      R side   • OTHER  [id number]    \"multiple procedures prior to nephrectom , stent placement/ nephrostomy tube    • URETEROSCOPY      partial removal of ureter with reimplantation     SOCHX:  reports that she has never smoked. She has never used smokeless tobacco. She reports previous alcohol use. She reports that she does not use drugs.  FH: Reviewed with patient, not pertinent to this visit.       Review of Systems   Constitutional: Negative for chills and fever.   Gastrointestinal: Negative for abdominal pain, nausea and vomiting.   Genitourinary: Positive for " "flank pain. Negative for dysuria, frequency, hematuria and urgency.        Objective:   /82 (BP Location: Left arm, Patient Position: Sitting, BP Cuff Size: Adult)   Pulse 90   Temp 36.3 °C (97.4 °F) (Temporal)   Resp 18   Ht 1.702 m (5' 7\")   Wt 118 kg (260 lb)   SpO2 100%   BMI 40.72 kg/m²   Physical Exam  Vitals and nursing note reviewed.   Constitutional:       General: She is not in acute distress.     Appearance: Normal appearance. She is well-developed. She is not ill-appearing or toxic-appearing.   HENT:      Head: Normocephalic and atraumatic.      Right Ear: Hearing normal.      Left Ear: Hearing normal.   Eyes:      Conjunctiva/sclera: Conjunctivae normal.   Cardiovascular:      Rate and Rhythm: Normal rate and regular rhythm.      Heart sounds: Normal heart sounds.   Pulmonary:      Effort: Pulmonary effort is normal.      Breath sounds: Normal breath sounds.   Abdominal:      Tenderness: There is no abdominal tenderness. There is left CVA tenderness. There is no right CVA tenderness.   Genitourinary:     Comments: Patient deferred  exam  Musculoskeletal:      Comments: Normal movement in all 4 extremities   Skin:     General: Skin is warm and dry.   Neurological:      Mental Status: She is alert.      Coordination: Coordination normal.   Psychiatric:         Mood and Affect: Mood normal.       CT  FINDINGS:  Chest Base: Lung bases are clear.     Liver:  Normal.     Gallbladder: Gallbladder is surgically absent.     Biliary tract: Nondilated.     Pancreas: Normal.     Spleen: Normal.     Adrenals: Normal.     Kidneys and Collecting Systems:  Right nephrectomy. No left renal or ureteral stone. No hydronephrosis.     Gastrointestinal tract:  No bowel obstruction. The appendix is not visualized, possibly removed. No pericecal inflammatory changes.     Peritoneum: No free air or free fluid.     Reproductive organs:  Prior hysterectomy. No significant adnexal mass.     Bladder:  No acute " abnormality. There is some asymmetric positioning possibly tethering of the urinary bladder to the right adnexal region.     Vessels:  Normal caliber.     Lymph  Nodes:  No lymphadenopathy.     Abdominal wall: Small fat-containing supraumbilical left paramedian ventral hernia     Bones:  No acute or aggressive abnormality.     IMPRESSION:        1.  No urolithiasis or left hydronephrosis.  2.  Right nephrectomy.    UA trace blood  Labs were all within normal limits including normal kidney function and no signs of infection today.  Assessment/Plan:   Assessment    1. Left flank pain  - POCT PREGNANCY  - POCT Urinalysis  - CT-RENAL COLIC EVALUATION(A/P W/O); Future  - CBC WITH DIFFERENTIAL; Future  - Comp Metabolic Panel; Future  - Urine Culture; Future    2. Single kidney  Symptoms and presentation most consistent with kidney stone on the left side.  Concern with her only having 1 kidney would like to check on her kidney function as well.  CT scan came back negative for any kidney stones.  Discussed with patient we will still rule out bacterial infection with urine culture.  Patient will have a low threshold to go to the emergency room with any new or worsening symptoms.  Given that CT was normal discussed with patient it could be back muscle strain or spasm.  She will use heat to the area and lots of rest.  Differential diagnosis, natural history, supportive care, and indications for immediate follow-up discussed.   Patient given instructions and understanding of medications and treatment.    If not improving in 3-5 days, F/U with PCP or return to  if symptoms worsen.  Strict ER precautions given.  Patient agreeable to plan.  Greater than 30 minutes were spent reviewing patient's chart, examining and obtaining history from patient, and discussing plan of care.     Please note that this dictation was created using voice recognition software. I have made every reasonable attempt to correct obvious errors, but I  expect that there are errors of grammar and possibly content that I did not discover before finalizing the note.    Doroteo Champagne PA-C

## 2021-07-24 LAB
BACTERIA UR CULT: NORMAL
SIGNIFICANT IND 70042: NORMAL
SITE SITE: NORMAL
SOURCE SOURCE: NORMAL

## 2021-08-19 RX ORDER — LORATADINE 10 MG
TABLET ORAL
Qty: 90 TABLET | Refills: 0 | Status: SHIPPED | OUTPATIENT
Start: 2021-08-19 | End: 2021-11-18

## 2021-11-21 ENCOUNTER — HOSPITAL ENCOUNTER (OUTPATIENT)
Facility: MEDICAL CENTER | Age: 46
End: 2021-11-21
Attending: PHYSICIAN ASSISTANT
Payer: COMMERCIAL

## 2021-11-21 ENCOUNTER — OFFICE VISIT (OUTPATIENT)
Dept: URGENT CARE | Facility: PHYSICIAN GROUP | Age: 46
End: 2021-11-21
Payer: COMMERCIAL

## 2021-11-21 VITALS
SYSTOLIC BLOOD PRESSURE: 132 MMHG | HEIGHT: 67 IN | TEMPERATURE: 97.9 F | HEART RATE: 92 BPM | WEIGHT: 242.6 LBS | OXYGEN SATURATION: 99 % | BODY MASS INDEX: 38.08 KG/M2 | DIASTOLIC BLOOD PRESSURE: 86 MMHG

## 2021-11-21 DIAGNOSIS — Z90.5 ABSENT KIDNEY: ICD-10-CM

## 2021-11-21 DIAGNOSIS — R39.89 SUSPECTED UTI: ICD-10-CM

## 2021-11-21 LAB
APPEARANCE UR: CLEAR
BILIRUB UR STRIP-MCNC: NEGATIVE MG/DL
COLOR UR AUTO: YELLOW
GLUCOSE UR STRIP.AUTO-MCNC: NEGATIVE MG/DL
KETONES UR STRIP.AUTO-MCNC: NEGATIVE MG/DL
LEUKOCYTE ESTERASE UR QL STRIP.AUTO: NEGATIVE
NITRITE UR QL STRIP.AUTO: NEGATIVE
PH UR STRIP.AUTO: 6 [PH] (ref 5–8)
PROT UR QL STRIP: NEGATIVE MG/DL
RBC UR QL AUTO: NORMAL
SP GR UR STRIP.AUTO: 1.02
UROBILINOGEN UR STRIP-MCNC: 0.2 MG/DL

## 2021-11-21 PROCEDURE — 99214 OFFICE O/P EST MOD 30 MIN: CPT | Performed by: PHYSICIAN ASSISTANT

## 2021-11-21 PROCEDURE — 87086 URINE CULTURE/COLONY COUNT: CPT

## 2021-11-21 PROCEDURE — 81002 URINALYSIS NONAUTO W/O SCOPE: CPT | Performed by: PHYSICIAN ASSISTANT

## 2021-11-21 RX ORDER — CEFDINIR 300 MG/1
300 CAPSULE ORAL 2 TIMES DAILY
Qty: 14 CAPSULE | Refills: 0 | Status: SHIPPED | OUTPATIENT
Start: 2021-11-21 | End: 2021-11-28

## 2021-11-21 ASSESSMENT — ENCOUNTER SYMPTOMS
CHANGE IN BOWEL HABIT: 0
DIARRHEA: 0
FLANK PAIN: 0
FEVER: 0
VOMITING: 0
BACK PAIN: 1
ABDOMINAL PAIN: 1

## 2021-11-21 ASSESSMENT — FIBROSIS 4 INDEX: FIB4 SCORE: 0.85

## 2021-11-21 NOTE — PROGRESS NOTES
"Subjective     Leslie Nazario is a 46 y.o. female who presents with UTI (Hx of UTI. Abdominal Pain )            Patient is a 46-year-old female who presents to urgent care concern regarding possible UTI.  Patient is status post nephrectomy due to history of renal stone of which she continues to have her left kidney.  Patient reports that symptoms feel very similar to \"dehydration\" and early UTI-like symptoms.  She is worried as they are about to leave for vacation tomorrow and she knows \"what her UTIs feel like \".  Patient does report left-sided abdominal discomfort with slight radiation into the left back which is very consistent with previous history of UTIs.  She denies any burning with urination or blood in her urine.  She denies \"stonelike \"pain.  Currently denies any vaginal symptoms.    UTI  This is a new problem. The current episode started 1 to 4 weeks ago. The problem occurs constantly. The problem has been waxing and waning. Associated symptoms include abdominal pain and urinary symptoms. Pertinent negatives include no change in bowel habit, fever or vomiting. Nothing aggravates the symptoms. She has tried nothing for the symptoms. The treatment provided mild relief.       Review of Systems   Constitutional: Negative for fever.   Gastrointestinal: Positive for abdominal pain. Negative for change in bowel habit, diarrhea and vomiting.   Genitourinary: Negative for dysuria, flank pain, frequency, hematuria and urgency.   Musculoskeletal: Positive for back pain.   All other systems reviewed and are negative.             Objective     /86 (BP Location: Left arm, Patient Position: Sitting, BP Cuff Size: Adult)   Pulse 92   Temp 36.6 °C (97.9 °F) (Temporal)   Ht 1.702 m (5' 7\")   Wt 110 kg (242 lb 9.6 oz)   SpO2 99%   BMI 38.00 kg/m²      Physical Exam  Vitals reviewed.   Constitutional:       General: She is not in acute distress.     Appearance: She is well-developed.   HENT:      Head: " "Normocephalic and atraumatic.      Right Ear: External ear normal.      Left Ear: External ear normal.   Eyes:      Conjunctiva/sclera: Conjunctivae normal.      Pupils: Pupils are equal, round, and reactive to light.   Neck:      Trachea: No tracheal deviation.   Cardiovascular:      Rate and Rhythm: Normal rate.   Pulmonary:      Effort: Pulmonary effort is normal.   Abdominal:      General: Bowel sounds are normal.      Tenderness: There is abdominal tenderness. There is left CVA tenderness. There is no right CVA tenderness.          Comments: Minimal left flank tenderness with positive left-sided CVAT.   Musculoskeletal:         General: Normal range of motion.      Cervical back: Normal range of motion and neck supple.   Skin:     General: Skin is warm.      Findings: No rash.      Comments: No rash to area exposed during the visit today.    Neurological:      Mental Status: She is alert and oriented to person, place, and time.      Coordination: Coordination normal.   Psychiatric:         Behavior: Behavior normal.         Thought Content: Thought content normal.         Judgment: Judgment normal.                             Assessment & Plan        1. Suspected UTI  - POCT Urinalysis  - URINE CULTURE(NEW); Future  - cefTRIAXone (Rocephin) 1 g, lidocaine (XYLOCAINE) 1 % 3.6 mL for IM use  - cefdinir (OMNICEF) 300 MG Cap; Take 1 Capsule by mouth 2 times a day for 7 days.  Dispense: 14 Capsule; Refill: 0    2. Absent kidney  - URINE CULTURE(NEW); Future            Urinalysis: Trace blood.  Sent for culture.  Patient expresses significant concern as she is leaving for vacation tomorrow and \"knows when she has an infection \".  Urinalysis is without profound changes however will send off for urine culture.  Records reveal previous hospitalization secondary to pyelonephritis along with hydronephrosis secondary to stone.  Patient does have significant complications as patient is status post nephrectomy-most recent " renal function in July is appropriate.   Will start the patient on the above.  Discussed importance of reevaluation while on her vacation if her pain persist or worsens.    Appropriate PPE worn at all times by provider.   Pt. Had face mask on throughout entirety of the visit other than oropharyngeal examination today.     Side effects of OTC or prescribed medications discussed.     DDX, Supportive care, and indications for immediate follow-up discussed with patient.    Instructed to return to clinic or nearest emergency department if we are not available for any change in condition, further concerns, or worsening of symptoms.    The patient and/or guardian demonstrated a good understanding and agreed with the treatment plan.    Please note that this dictation was created using voice recognition software. I have made every reasonable attempt to correct obvious errors, but I expect that there are errors of grammar and possibly content that I did not discover before finalizing the note.

## 2021-11-24 LAB
BACTERIA UR CULT: NORMAL
SIGNIFICANT IND 70042: NORMAL
SITE SITE: NORMAL
SOURCE SOURCE: NORMAL

## 2022-01-31 ENCOUNTER — HOSPITAL ENCOUNTER (OUTPATIENT)
Facility: MEDICAL CENTER | Age: 47
End: 2022-01-31
Attending: FAMILY MEDICINE
Payer: COMMERCIAL

## 2022-01-31 ENCOUNTER — OFFICE VISIT (OUTPATIENT)
Dept: MEDICAL GROUP | Facility: PHYSICIAN GROUP | Age: 47
End: 2022-01-31
Payer: COMMERCIAL

## 2022-01-31 VITALS
HEIGHT: 67 IN | TEMPERATURE: 99 F | OXYGEN SATURATION: 98 % | DIASTOLIC BLOOD PRESSURE: 86 MMHG | WEIGHT: 245.15 LBS | SYSTOLIC BLOOD PRESSURE: 124 MMHG | BODY MASS INDEX: 38.48 KG/M2

## 2022-01-31 DIAGNOSIS — N39.0 RECURRENT UTI: ICD-10-CM

## 2022-01-31 DIAGNOSIS — E78.5 DYSLIPIDEMIA: ICD-10-CM

## 2022-01-31 DIAGNOSIS — I10 ESSENTIAL HYPERTENSION: ICD-10-CM

## 2022-01-31 DIAGNOSIS — R73.01 IMPAIRED FASTING BLOOD SUGAR: ICD-10-CM

## 2022-01-31 LAB
APPEARANCE UR: NORMAL
BILIRUB UR STRIP-MCNC: NORMAL MG/DL
COLOR UR AUTO: NORMAL
GLUCOSE UR STRIP.AUTO-MCNC: NORMAL MG/DL
KETONES UR STRIP.AUTO-MCNC: NORMAL MG/DL
LEUKOCYTE ESTERASE UR QL STRIP.AUTO: NORMAL
NITRITE UR QL STRIP.AUTO: NORMAL
PH UR STRIP.AUTO: 5.5 [PH] (ref 5–8)
PROT UR QL STRIP: NORMAL MG/DL
RBC UR QL AUTO: NORMAL
SP GR UR STRIP.AUTO: 1.02
UROBILINOGEN UR STRIP-MCNC: 0.2 MG/DL

## 2022-01-31 PROCEDURE — 99214 OFFICE O/P EST MOD 30 MIN: CPT | Performed by: FAMILY MEDICINE

## 2022-01-31 PROCEDURE — 81002 URINALYSIS NONAUTO W/O SCOPE: CPT | Performed by: FAMILY MEDICINE

## 2022-01-31 PROCEDURE — 87086 URINE CULTURE/COLONY COUNT: CPT

## 2022-01-31 PROCEDURE — 99000 SPECIMEN HANDLING OFFICE-LAB: CPT | Performed by: FAMILY MEDICINE

## 2022-01-31 RX ORDER — ATORVASTATIN CALCIUM 10 MG/1
10 TABLET, FILM COATED ORAL EVERY EVENING
Qty: 90 TABLET | Refills: 1 | Status: SHIPPED | OUTPATIENT
Start: 2022-01-31 | End: 2022-07-27

## 2022-01-31 RX ORDER — METOPROLOL SUCCINATE 25 MG/1
25 TABLET, EXTENDED RELEASE ORAL DAILY
Qty: 90 TABLET | Refills: 1 | Status: SHIPPED | OUTPATIENT
Start: 2022-01-31 | End: 2022-07-27

## 2022-01-31 RX ORDER — LISINOPRIL 10 MG/1
10 TABLET ORAL DAILY
Qty: 90 TABLET | Refills: 1 | Status: SHIPPED | OUTPATIENT
Start: 2022-01-31 | End: 2022-04-26

## 2022-01-31 RX ORDER — ASPIRIN 81 MG/1
81 TABLET, CHEWABLE ORAL
Qty: 90 TABLET | Refills: 1 | Status: SHIPPED | OUTPATIENT
Start: 2022-01-31 | End: 2022-07-28

## 2022-01-31 ASSESSMENT — PATIENT HEALTH QUESTIONNAIRE - PHQ9: CLINICAL INTERPRETATION OF PHQ2 SCORE: 0

## 2022-01-31 ASSESSMENT — FIBROSIS 4 INDEX: FIB4 SCORE: 0.85

## 2022-02-01 NOTE — PROGRESS NOTES
Subjective     Leslie Nazario is a 46 y.o. female who presents with Medication Refill (b/p Metoprolol, atorvastatin) and Cystitis            HPI     Patient is here for follow-up of her medical problems as well as to get evaluated for possible UTI.    Her last visit with me was in February 2021.    Patient with history of recurrent UTI.  She has already been seen and evaluated by urology and she said she had cystoscopy done last year that came back no abnormal findings.  Patient had right nephrectomy years ago because of recurrent kidney stones.  She complains of lower abdominal pain/suprapubic pain of 2-week duration.  Lately she started having dysuria in the last 3 days.  She also cannot hold her urine in the last 3 days.  Denies any fever, chills, blood in the urine, flank pain.  She said she was seen at urgent care couple of times with only symptom she had was lower abdominal pain in July 2021 in November 2021.  She said both times they treated her with antibiotics even if the urine culture came back no evidence of infection because the lower abdominal pain is her typical symptom of UTI and the fact that she only has 1 kidney.    For her hypertension, she ran out of metoprolol 3 weeks ago.  She is only taking the lisinopril at this time.  Blood pressure is not elevated even without the metoprolol.    In terms of the dyslipidemia, she has been out of the atorvastatin for 3 weeks as well.  The last lipid panel was in November 2020 and the LDL was still high at 123 and HDL improved and increased from 35-40 and triglycerides went down from 270-109.    We follow her for impaired fasting blood sugar and she is managing this by watching her diet.    Past medical history, past surgical history, family history reviewed-no changes    Social history reviewed-no changes    Allergies reviewed-no changes    Medications reviewed-no changes      ROS     As per HPI, the rest are negative.           Objective     /86   " Temp 37.2 °C (99 °F) (Temporal)   Ht 1.702 m (5' 7\")   Wt 111 kg (245 lb 2.4 oz)   SpO2 98%   BMI 38.40 kg/m²      Physical Exam     Examined alert, awake, oriented, not in distress    Neck-supple, no lymphadenopathy, no thyromegaly  Lungs-clear to auscultation, no rales, no wheezes  Heart-regular rate and rhythm, no murmur  Abdomen-good bowel sounds, soft, slightly tender suprapubic area, no hepatosplenomegaly, no masses, no CVA tenderness  Extremities-no edema, clubbing, cyanosis          Urine dipstick    Results for MATTHIEU OVIEDO (MRN [id number]) as of 2/1/2022 12:58   Ref. Range 1/31/2022 15:48   POC Color Latest Ref Range: Negative  orange   POC Appearance Latest Ref Range: Negative  sl cloudy   POC Specific Gravity Latest Ref Range: <1.005 - >1.030  1.025   POC Urine PH Latest Ref Range: 5.0 - 8.0  5.5   POC Glucose Latest Ref Range: Negative mg/dL neg   POC Ketones Latest Ref Range: Negative mg/dL neg   POC Protein Latest Ref Range: Negative mg/dL neg   POC Nitrites Latest Ref Range: Negative  neg   POC Leukocyte Esterase Latest Ref Range: Negative  neg   POC Blood Latest Ref Range: Negative  neg   POC Bilirubin Latest Ref Range: Negative mg/dL neg   POC Urobiligen Latest Ref Range: Negative (0.2) mg/dL 0.2                Assessment & Plan        1. Recurrent UTI  Her urine dipstick is completely clear without evidence of infection.  She however has typical symptoms of UTI.  I will send the urine for culture and treat if positive for infection.  Advised increase p.o. fluids for now.  - POCT Urinalysis  - URINE CULTURE(NEW); Future  - Lipid Profile; Future  - Comp Metabolic Panel; Future  - HEMOGLOBIN A1C; Future  - CBC WITH DIFFERENTIAL; Future  - URINE CULTURE(NEW); Future    2. Dyslipidemia  We will do updated lipid panel.  Continue atorvastatin.  Refill sent to the pharmacy.  - Lipid Profile; Future  - Comp Metabolic Panel; Future  - HEMOGLOBIN A1C; Future  - CBC WITH DIFFERENTIAL; Future  - " atorvastatin (LIPITOR) 10 MG Tab; Take 1 Tablet by mouth every evening.  Dispense: 90 Tablet; Refill: 1    3. Essential hypertension  Out of metoprolol for 3 weeks but fortunately blood pressure is running good on lisinopril only but I sent prescription refills of metoprolol as well as lisinopril to the pharmacy.  - Lipid Profile; Future  - Comp Metabolic Panel; Future  - HEMOGLOBIN A1C; Future  - CBC WITH DIFFERENTIAL; Future  - metoprolol SR (TOPROL XL) 25 MG TABLET SR 24 HR; Take 1 Tablet by mouth every day.  Dispense: 90 Tablet; Refill: 1  - lisinopril (PRINIVIL) 10 MG Tab; Take 1 Tablet by mouth every day.  Dispense: 90 Tablet; Refill: 1    4. Impaired fasting blood sugar  We will do updated fasting blood sugar with hemoglobin A1c.  - Lipid Profile; Future  - Comp Metabolic Panel; Future  - HEMOGLOBIN A1C; Future  - CBC WITH DIFFERENTIAL; Future    I will see her for follow-up in 4 months or sooner if needed.      Please note that this dictation was created using voice recognition software. I have worked with consultants from the vendor as well as technical experts from [facility]  [employer] to optimize the interface. I have made every reasonable attempt to correct obvious errors, but I expect that there are errors of grammar and possibly content I did not discover before finalizing the note.

## 2022-02-03 LAB
BACTERIA UR CULT: NORMAL
SIGNIFICANT IND 70042: NORMAL
SITE SITE: NORMAL
SOURCE SOURCE: NORMAL

## 2022-04-26 DIAGNOSIS — I10 ESSENTIAL HYPERTENSION: ICD-10-CM

## 2022-04-26 RX ORDER — LISINOPRIL 10 MG/1
10 TABLET ORAL DAILY
Qty: 90 TABLET | Refills: 1 | Status: SHIPPED | OUTPATIENT
Start: 2022-04-26 | End: 2022-10-03

## 2022-04-29 LAB
ALBUMIN SERPL-MCNC: 4.2 G/DL (ref 3.8–4.8)
ALBUMIN/GLOB SERPL: 1.5 {RATIO} (ref 1.2–2.2)
ALP SERPL-CCNC: 91 IU/L (ref 44–121)
ALT SERPL-CCNC: 18 IU/L (ref 0–32)
AST SERPL-CCNC: 17 IU/L (ref 0–40)
BASOPHILS # BLD AUTO: 0.1 X10E3/UL (ref 0–0.2)
BASOPHILS NFR BLD AUTO: 1 %
BILIRUB SERPL-MCNC: 0.4 MG/DL (ref 0–1.2)
BUN SERPL-MCNC: 18 MG/DL (ref 6–24)
BUN/CREAT SERPL: 24 (ref 9–23)
CALCIUM SERPL-MCNC: 9.6 MG/DL (ref 8.7–10.2)
CHLORIDE SERPL-SCNC: 106 MMOL/L (ref 96–106)
CHOLEST SERPL-MCNC: 221 MG/DL (ref 100–199)
CO2 SERPL-SCNC: 21 MMOL/L (ref 20–29)
CREAT SERPL-MCNC: 0.76 MG/DL (ref 0.57–1)
EGFRCR SERPLBLD CKD-EPI 2021: 97 ML/MIN/1.73
EOSINOPHIL # BLD AUTO: 0.1 X10E3/UL (ref 0–0.4)
EOSINOPHIL NFR BLD AUTO: 2 %
ERYTHROCYTE [DISTWIDTH] IN BLOOD BY AUTOMATED COUNT: 12.9 % (ref 11.7–15.4)
GLOBULIN SER CALC-MCNC: 2.8 G/DL (ref 1.5–4.5)
GLUCOSE SERPL-MCNC: 82 MG/DL (ref 65–99)
HBA1C MFR BLD: 5.4 % (ref 4.8–5.6)
HCT VFR BLD AUTO: 41.4 % (ref 34–46.6)
HDLC SERPL-MCNC: 46 MG/DL
HGB BLD-MCNC: 14.1 G/DL (ref 11.1–15.9)
IMM GRANULOCYTES # BLD AUTO: 0 X10E3/UL (ref 0–0.1)
IMM GRANULOCYTES NFR BLD AUTO: 0 %
IMMATURE CELLS  115398: NORMAL
LABORATORY COMMENT REPORT: ABNORMAL
LDLC SERPL CALC-MCNC: 157 MG/DL (ref 0–99)
LYMPHOCYTES # BLD AUTO: 2.8 X10E3/UL (ref 0.7–3.1)
LYMPHOCYTES NFR BLD AUTO: 39 %
MCH RBC QN AUTO: 30.3 PG (ref 26.6–33)
MCHC RBC AUTO-ENTMCNC: 34.1 G/DL (ref 31.5–35.7)
MCV RBC AUTO: 89 FL (ref 79–97)
MONOCYTES # BLD AUTO: 0.5 X10E3/UL (ref 0.1–0.9)
MONOCYTES NFR BLD AUTO: 7 %
MORPHOLOGY BLD-IMP: NORMAL
NEUTROPHILS # BLD AUTO: 3.7 X10E3/UL (ref 1.4–7)
NEUTROPHILS NFR BLD AUTO: 51 %
NRBC BLD AUTO-RTO: NORMAL %
PLATELET # BLD AUTO: 280 X10E3/UL (ref 150–450)
POTASSIUM SERPL-SCNC: 4.1 MMOL/L (ref 3.5–5.2)
PROT SERPL-MCNC: 7 G/DL (ref 6–8.5)
RBC # BLD AUTO: 4.66 X10E6/UL (ref 3.77–5.28)
SODIUM SERPL-SCNC: 143 MMOL/L (ref 134–144)
TRIGL SERPL-MCNC: 98 MG/DL (ref 0–149)
VLDLC SERPL CALC-MCNC: 18 MG/DL (ref 5–40)
WBC # BLD AUTO: 7.1 X10E3/UL (ref 3.4–10.8)

## 2022-05-03 ENCOUNTER — HOSPITAL ENCOUNTER (OUTPATIENT)
Facility: MEDICAL CENTER | Age: 47
End: 2022-05-03
Attending: FAMILY MEDICINE
Payer: COMMERCIAL

## 2022-05-03 ENCOUNTER — OFFICE VISIT (OUTPATIENT)
Dept: MEDICAL GROUP | Facility: PHYSICIAN GROUP | Age: 47
End: 2022-05-03
Payer: COMMERCIAL

## 2022-05-03 VITALS
TEMPERATURE: 100.2 F | HEART RATE: 99 BPM | DIASTOLIC BLOOD PRESSURE: 80 MMHG | SYSTOLIC BLOOD PRESSURE: 120 MMHG | WEIGHT: 253.75 LBS | HEIGHT: 67 IN | BODY MASS INDEX: 39.83 KG/M2 | OXYGEN SATURATION: 96 %

## 2022-05-03 DIAGNOSIS — I10 ESSENTIAL HYPERTENSION: ICD-10-CM

## 2022-05-03 DIAGNOSIS — R73.01 IMPAIRED FASTING BLOOD SUGAR: ICD-10-CM

## 2022-05-03 DIAGNOSIS — B34.9 VIRAL ILLNESS: ICD-10-CM

## 2022-05-03 DIAGNOSIS — N39.0 RECURRENT UTI: ICD-10-CM

## 2022-05-03 DIAGNOSIS — Z20.822 SUSPECTED COVID-19 VIRUS INFECTION: ICD-10-CM

## 2022-05-03 DIAGNOSIS — E78.5 DYSLIPIDEMIA: ICD-10-CM

## 2022-05-03 LAB
APPEARANCE UR: CLEAR
BILIRUB UR STRIP-MCNC: NORMAL MG/DL
COLOR UR AUTO: NORMAL
EXTERNAL QUALITY CONTROL: NORMAL
FLUAV+FLUBV AG SPEC QL IA: ABNORMAL
GLUCOSE UR STRIP.AUTO-MCNC: NORMAL MG/DL
INT CON NEG: NEGATIVE
INT CON POS: POSITIVE
KETONES UR STRIP.AUTO-MCNC: NORMAL MG/DL
LEUKOCYTE ESTERASE UR QL STRIP.AUTO: NORMAL
NITRITE UR QL STRIP.AUTO: NORMAL
PH UR STRIP.AUTO: 6 [PH] (ref 5–8)
PROT UR QL STRIP: NORMAL MG/DL
RBC UR QL AUTO: NORMAL
SARS-COV+SARS-COV-2 AG RESP QL IA.RAPID: NEGATIVE
SP GR UR STRIP.AUTO: 1
UROBILINOGEN UR STRIP-MCNC: 0.2 MG/DL

## 2022-05-03 PROCEDURE — 87804 INFLUENZA ASSAY W/OPTIC: CPT | Performed by: FAMILY MEDICINE

## 2022-05-03 PROCEDURE — 81002 URINALYSIS NONAUTO W/O SCOPE: CPT | Performed by: FAMILY MEDICINE

## 2022-05-03 PROCEDURE — 87086 URINE CULTURE/COLONY COUNT: CPT

## 2022-05-03 PROCEDURE — U0005 INFEC AGEN DETEC AMPLI PROBE: HCPCS

## 2022-05-03 PROCEDURE — 87186 SC STD MICRODIL/AGAR DIL: CPT

## 2022-05-03 PROCEDURE — 87426 SARSCOV CORONAVIRUS AG IA: CPT | Performed by: FAMILY MEDICINE

## 2022-05-03 PROCEDURE — 99000 SPECIMEN HANDLING OFFICE-LAB: CPT | Performed by: FAMILY MEDICINE

## 2022-05-03 PROCEDURE — 87077 CULTURE AEROBIC IDENTIFY: CPT

## 2022-05-03 PROCEDURE — U0003 INFECTIOUS AGENT DETECTION BY NUCLEIC ACID (DNA OR RNA); SEVERE ACUTE RESPIRATORY SYNDROME CORONAVIRUS 2 (SARS-COV-2) (CORONAVIRUS DISEASE [COVID-19]), AMPLIFIED PROBE TECHNIQUE, MAKING USE OF HIGH THROUGHPUT TECHNOLOGIES AS DESCRIBED BY CMS-2020-01-R: HCPCS

## 2022-05-03 PROCEDURE — 99214 OFFICE O/P EST MOD 30 MIN: CPT | Mod: CS | Performed by: FAMILY MEDICINE

## 2022-05-03 RX ORDER — NITROFURANTOIN 25; 75 MG/1; MG/1
100 CAPSULE ORAL 2 TIMES DAILY
Qty: 14 CAPSULE | Refills: 0 | Status: SHIPPED | OUTPATIENT
Start: 2022-05-03 | End: 2022-10-03

## 2022-05-03 ASSESSMENT — FIBROSIS 4 INDEX: FIB4 SCORE: 0.67

## 2022-05-04 DIAGNOSIS — Z20.822 SUSPECTED COVID-19 VIRUS INFECTION: ICD-10-CM

## 2022-05-04 LAB
COVID ORDER STATUS COVID19: NORMAL
SARS-COV-2 RNA RESP QL NAA+PROBE: NOTDETECTED
SPECIMEN SOURCE: NORMAL

## 2022-05-04 NOTE — PROGRESS NOTES
"Subjective     Leslie Nazario is a 47 y.o. female who presents with Follow-Up (B/P)            HPI     Patient is here for regular follow-up of her medical problems but she also has acute complaints.    She said she has been having frontal headache which is pressure-like that has been ongoing for the last 5 to 6 days.  She has been feeling achy in the last 2 days.  Denies any fever or chills.  She said in the past Tylenol works very well for her headache but this has not been helping much.  Denies any cough or cold symptoms.  She said she always has diarrhea about 2-3 times per day which is not new to her.  Patient with history of recurrent UTI but she denies any dysuria, frequency, urgency, flank pain.  Denies blood in the urine.  She has ongoing abdominal discomfort in the periumbilical area, suprapubic area which is a longstanding problem.  In the past when she has UTI her only symptom is the abdominal discomfort.  No exposure to COVID-19 confirmed case.  She got 2 doses of the COVID-19 vaccine and 1 booster shot.    In terms of her hypertension, this is under control on lisinopril and metoprolol.  Even with a headache today the blood pressure is still running under control.    For the dyslipidemia, she continues to take atorvastatin.    We follow her for impaired fasting blood sugar and she tries to manage this by watching her diet.    She works from home so she does not need a work note for her illness.    Past medical history, past surgical history, family history reviewed-no changes    Social history reviewed-no changes    Allergies reviewed-no changes    Medications reviewed-no changes        ROS     As per HPI, the rest are negative.           Objective     /80 (BP Location: Left arm, Patient Position: Sitting, BP Cuff Size: Adult)   Pulse 99   Temp 37.9 °C (100.2 °F) (Temporal)   Ht 1.702 m (5' 7\")   Wt 115 kg (253 lb 12 oz)   SpO2 96%   BMI 39.74 kg/m²      Physical Exam     Examined " alert, awake, oriented, not in distress    Earstympanic membranes intact bilaterally without evidence of infection  Nose-no discharge, no obstruction  Throat-no erythema, tonsils not enlarged, no exudates  Neck-supple, no lymphadenopathy, no thyromegaly  Lungs-clear to auscultation, no rales, no wheezes  Heart-regular rate and rhythm, no murmur  Abdomen-good bowel sounds, soft, nontender, no hepatosplenomegaly, no masses, no CVA tenderness  Extremities-no edema, clubbing, cyanosis          Urine dipstick    Results for MATTHIEU OVIEDO (MRN [id number]) as of 5/4/2022 07:33   Ref. Range 5/3/2022 11:34   POC Color Latest Ref Range: Negative  dk yl   POC Appearance Latest Ref Range: Negative  clear   POC Specific Gravity Latest Ref Range: <1.005 - >1.030  1.005   POC Urine PH Latest Ref Range: 5.0 - 8.0  6.0   POC Glucose Latest Ref Range: Negative mg/dL neg   POC Ketones Latest Ref Range: Negative mg/dL neg   POC Protein Latest Ref Range: Negative mg/dL neg   POC Nitrites Latest Ref Range: Negative  neg   POC Leukocyte Esterase Latest Ref Range: Negative  small   POC Blood Latest Ref Range: Negative  neg   POC Bilirubin Latest Ref Range: Negative mg/dL neg   POC Urobiligen Latest Ref Range: Negative (0.2) mg/dL 0.2     POCT COVID-19 test-negative  Rapid influenza A/B-negative       Orders Only on 04/28/2022   Component Date Value Ref Range Status   • WBC 04/28/2022 7.1  3.4 - 10.8 x10E3/uL Final   • RBC 04/28/2022 4.66  3.77 - 5.28 x10E6/uL Final   • Hemoglobin 04/28/2022 14.1  11.1 - 15.9 g/dL Final   • Hematocrit 04/28/2022 41.4  34.0 - 46.6 % Final   • MCV 04/28/2022 89  79 - 97 fL Final   • MCH 04/28/2022 30.3  26.6 - 33.0 pg Final   • MCHC 04/28/2022 34.1  31.5 - 35.7 g/dL Final   • RDW 04/28/2022 12.9  11.7 - 15.4 % Final   • Platelet Count 04/28/2022 280  150 - 450 x10E3/uL Final   • Neutrophils-Polys 04/28/2022 51  Not Estab. % Final   • Lymphocytes 04/28/2022 39  Not Estab. % Final   • Monocytes 04/28/2022  7  Not Estab. % Final   • Eosinophils 04/28/2022 2  Not Estab. % Final   • Basophils 04/28/2022 1  Not Estab. % Final   • Immature Cells 04/28/2022 CANCELED   Final    Result canceled by the ancillary.   • Neutrophils (Absolute) 04/28/2022 3.7  1.4 - 7.0 x10E3/uL Final   • Lymphs (Absolute) 04/28/2022 2.8  0.7 - 3.1 x10E3/uL Final   • Monos (Absolute) 04/28/2022 0.5  0.1 - 0.9 x10E3/uL Final   • Eos (Absolute) 04/28/2022 0.1  0.0 - 0.4 x10E3/uL Final   • Baso (Absolute) 04/28/2022 0.1  0.0 - 0.2 x10E3/uL Final   • Immature Granulocytes 04/28/2022 0  Not Estab. % Final   • Immature Granulocytes (abs) 04/28/2022 0.0  0.0 - 0.1 x10E3/uL Final   • Nucleated RBC 04/28/2022 CANCELED   Final    Result canceled by the ancillary.   • Comments-Diff 04/28/2022 CANCELED   Final    Result canceled by the ancillary.   • Glucose 04/28/2022 82  65 - 99 mg/dL Final   • Bun 04/28/2022 18  6 - 24 mg/dL Final   • Creatinine 04/28/2022 0.76  0.57 - 1.00 mg/dL Final   • eGFR 04/28/2022 97  >59 mL/min/1.73 Final   • Bun-Creatinine Ratio 04/28/2022 24 (A) 9 - 23 Final   • Sodium 04/28/2022 143  134 - 144 mmol/L Final   • Potassium 04/28/2022 4.1  3.5 - 5.2 mmol/L Final   • Chloride 04/28/2022 106  96 - 106 mmol/L Final   • Co2 04/28/2022 21  20 - 29 mmol/L Final   • Calcium 04/28/2022 9.6  8.7 - 10.2 mg/dL Final   • Total Protein 04/28/2022 7.0  6.0 - 8.5 g/dL Final   • Albumin 04/28/2022 4.2  3.8 - 4.8 g/dL Final   • Globulin 04/28/2022 2.8  1.5 - 4.5 g/dL Final   • A-G Ratio 04/28/2022 1.5  1.2 - 2.2 Final   • Total Bilirubin 04/28/2022 0.4  0.0 - 1.2 mg/dL Final   • Alkaline Phosphatase 04/28/2022 91  44 - 121 IU/L Final   • AST(SGOT) 04/28/2022 17  0 - 40 IU/L Final   • ALT(SGPT) 04/28/2022 18  0 - 32 IU/L Final   • Cholesterol,Tot 04/28/2022 221 (A) 100 - 199 mg/dL Final   • Triglycerides 04/28/2022 98  0 - 149 mg/dL Final   • HDL 04/28/2022 46  >39 mg/dL Final   • VLDL Cholesterol Calc 04/28/2022 18  5 - 40 mg/dL Final   • LDL Chol  Calc ([facility]) 04/28/2022 157 (A) 0 - 99 mg/dL Final   • Comment: 04/28/2022 CANCELED   Final    Result canceled by the ancillary.   • Glycohemoglobin 04/28/2022 5.4  4.8 - 5.6 % Final    Comment: Prediabetes: 5.7 - 6.4  Diabetes: >6.4  Glycemic control for adults with diabetes: <7.0           Assessment & Plan        1. Suspected COVID-19 virus infection  She has low-grade fever here in the office with headache and body ache.  The rapid COVID-19 test done here in the office came back negative.  Rapid influenza A/B test also came back negative.  COVID-19 by PCR sent to the lab.  It takes 1 to 2 days for this to come back and I will get back with her with results.  Advised isolation until we get the results.  We will manage conservatively with increase p.o. fluids, rest, Tylenol as needed for fever or pain.  May take ibuprofen 400 mg 3 times a day with food as needed but not more than 7 days straight at a time.  - POCT SARS-COV Antigen KAYLYNN (Symptomatic only)  - SARS-CoV-2 PCR (24 hour In-House): Collect NP swab in VTM; Future    2. Viral illness  Negative POCT COVID-19 and rapid influenza A/B.  We will wait for the COVID-19 PCR test result.  We will manage conservatively for now.  - POCT Influenza A/B    3. Recurrent UTI  The urine dipstick came back positive for small leukocytes.  This could indicate UTI.  Because of history of recurrent UTI, we will treat with antibiotics pending culture results.  I will get back with her once I get a final report.  Advise increase p.o. fluids and rest.  - POCT Urinalysis  - URINE CULTURE(NEW); Future  - nitrofurantoin (MACROBID) 100 MG Cap; Take 1 Capsule by mouth 2 times a day.  Dispense: 14 Capsule; Refill: 0    4. Essential hypertension  Controlled on her meds.  - Lipid Profile; Future  - Comp Metabolic Panel; Future  - HEMOGLOBIN A1C; Future    5. Dyslipidemia  LDL cholesterol increased from 123-157.  Goal is below 100.  She said she is taking the cholesterol medication regularly.   For now we will have her work on watching her diet more closely, regular exercise and weight loss to improve this.  She is only on a small dose of atorvastatin.  Consider higher dose if there is no improvement next visit.  - Lipid Profile; Future  - Comp Metabolic Panel; Future  - HEMOGLOBIN A1C; Future    6. Impaired fasting blood sugar  Fasting blood sugar and hemoglobin A1c are both normal therefore she has done well in keeping this under control by watching the diet.  Continue managing this by watching the carbs in the sweets.  Advised regular exercises and weight loss.  - Lipid Profile; Future  - Comp Metabolic Panel; Future  - HEMOGLOBIN A1C; Future    Return for follow-up in 4 months or sooner if needed.      Please note that this dictation was created using voice recognition software. I have worked with consultants from the vendor as well as technical experts from [employer] to optimize the interface. I have made every reasonable attempt to correct obvious errors, but I expect that there are errors of grammar and possibly content I did not discover before finalizing the note.

## 2022-05-22 ENCOUNTER — OFFICE VISIT (OUTPATIENT)
Dept: URGENT CARE | Facility: PHYSICIAN GROUP | Age: 47
End: 2022-05-22
Payer: COMMERCIAL

## 2022-05-22 ENCOUNTER — HOSPITAL ENCOUNTER (OUTPATIENT)
Dept: RADIOLOGY | Facility: MEDICAL CENTER | Age: 47
End: 2022-05-22
Attending: NURSE PRACTITIONER
Payer: COMMERCIAL

## 2022-05-22 VITALS
RESPIRATION RATE: 16 BRPM | OXYGEN SATURATION: 100 % | HEIGHT: 67 IN | TEMPERATURE: 97.2 F | HEART RATE: 84 BPM | DIASTOLIC BLOOD PRESSURE: 74 MMHG | BODY MASS INDEX: 39.71 KG/M2 | WEIGHT: 253 LBS | SYSTOLIC BLOOD PRESSURE: 112 MMHG

## 2022-05-22 DIAGNOSIS — M25.572 ACUTE LEFT ANKLE PAIN: ICD-10-CM

## 2022-05-22 DIAGNOSIS — S93.402A SPRAIN OF LEFT ANKLE, UNSPECIFIED LIGAMENT, INITIAL ENCOUNTER: ICD-10-CM

## 2022-05-22 PROCEDURE — 99213 OFFICE O/P EST LOW 20 MIN: CPT | Performed by: NURSE PRACTITIONER

## 2022-05-22 PROCEDURE — 73610 X-RAY EXAM OF ANKLE: CPT | Mod: LT

## 2022-05-22 ASSESSMENT — ENCOUNTER SYMPTOMS
FOCAL WEAKNESS: 0
TINGLING: 0
SENSORY CHANGE: 0

## 2022-05-22 ASSESSMENT — FIBROSIS 4 INDEX: FIB4 SCORE: 0.67

## 2022-05-22 NOTE — PROGRESS NOTES
Subjective     Leslie Nazario is a 47 y.o. female who presents with Ankle Injury ((L) ankle x 4 days and pain is getting worse)            HPI New. 47 year old female with acute left ankle pain following a fall from first step of ladder on 3 days ago. She denies swelling, bruising or focal weakness. Denies distal paresthesia. Pain is to anterior aspect of ankle and described as sharp, shooting and with radiation to knee. She has been using RICE treatment and taking tylenol for this issue.   Patient has no known allergies.  Current Outpatient Medications on File Prior to Visit   Medication Sig Dispense Refill   • lisinopril (PRINIVIL) 10 MG Tab TAKE 1 TABLET BY MOUTH EVERY DAY 90 Tablet 1   • atorvastatin (LIPITOR) 10 MG Tab Take 1 Tablet by mouth every evening. 90 Tablet 1   • aspirin ([employer] ASPIRIN ADULT LOW DOSE) 81 MG Chew Tab chewable tablet Chew 1 Tablet every day. Chew and swallow 90 Tablet 1   • metoprolol SR (TOPROL XL) 25 MG TABLET SR 24 HR TAKE 1 TABLET BY MOUTH EVERY DAY 90 Tablet 0   • nitrofurantoin (MACROBID) 100 MG Cap Take 1 Capsule by mouth 2 times a day. 14 Capsule 0   • metoprolol SR (TOPROL XL) 25 MG TABLET SR 24 HR Take 1 Tablet by mouth every day. 90 Tablet 1   • lisinopril (PRINIVIL) 10 MG Tab TAKE 1 TABLET BY MOUTH EVERY DAY (Patient not taking: Reported on 5/3/2022) 90 Tablet 0     No current facility-administered medications on file prior to visit.     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     Employer: [employer]   Tobacco Use   • Smoking status: Never Smoker   • Smokeless tobacco: Never Used   Vaping Use   • Vaping Use: Never used   Substance and Sexual Activity   • Alcohol use: Not Currently     Alcohol/week: 0.0 oz      "Comment: 2 per month   • Drug use: No   • Sexual activity: Yes     Partners: Male   Other Topics Concern   • Not on file   Social History Narrative   • Not on file     Social Determinants of Health     Financial Resource Strain: Not on file   Food Insecurity: Not on file   Transportation Needs: Not on file   Physical Activity: Not on file   Stress: Not on file   Social Connections: Not on file   Intimate Partner Violence: Not on file   Housing Stability: Not on file     Breast Cancer-related family history is not on file.      Review of Systems   Musculoskeletal: Positive for joint pain.   Skin: Negative.    Neurological: Negative for tingling, sensory change and focal weakness.              Objective     /74 (BP Location: Left arm, Patient Position: Sitting, BP Cuff Size: Large adult)   Pulse 84   Temp 36.2 °C (97.2 °F) (Temporal)   Resp 16   Ht 1.702 m (5' 7\")   Wt 115 kg (253 lb)   SpO2 100%   BMI 39.63 kg/m²      Physical Exam  Vitals and nursing note reviewed.   Constitutional:       Appearance: Normal appearance. She is obese.   Musculoskeletal:         General: Normal range of motion.      Left ankle: No swelling, deformity or ecchymosis. No tenderness. Normal range of motion.   Skin:     General: Skin is warm and dry.      Coloration: Skin is not pale.      Findings: No bruising.   Neurological:      General: No focal deficit present.      Mental Status: She is alert and oriented to person, place, and time.                             Assessment & Plan         1. Sprain of left ankle, unspecified ligament, initial encounter     2. Acute left ankle pain  DX-ANKLE 3+ VIEWS LEFT     X-ray negative for fracture.  Patient placed in boot for comfort.  RICE protocol.   Reviewed diagnosis and expected timeline for healing.  Tylenol for pain.  Differential diagnosis, natural history, supportive care, and indications for immediate follow-up discussed at length.                   "

## 2022-08-30 LAB
ALBUMIN SERPL-MCNC: 4.4 G/DL (ref 3.8–4.8)
ALBUMIN/GLOB SERPL: 2 {RATIO} (ref 1.2–2.2)
ALP SERPL-CCNC: 85 IU/L (ref 44–121)
ALT SERPL-CCNC: 16 IU/L (ref 0–32)
AST SERPL-CCNC: 16 IU/L (ref 0–40)
BILIRUB SERPL-MCNC: 0.5 MG/DL (ref 0–1.2)
BUN SERPL-MCNC: 14 MG/DL (ref 6–24)
BUN/CREAT SERPL: 17 (ref 9–23)
CALCIUM SERPL-MCNC: 9 MG/DL (ref 8.7–10.2)
CHLORIDE SERPL-SCNC: 104 MMOL/L (ref 96–106)
CHOLEST SERPL-MCNC: 218 MG/DL (ref 100–199)
CO2 SERPL-SCNC: 21 MMOL/L (ref 20–29)
CREAT SERPL-MCNC: 0.81 MG/DL (ref 0.57–1)
EGFRCR-CYS SERPLBLD CKD-EPI 2021: 90 ML/MIN/1.73
GLOBULIN SER CALC-MCNC: 2.2 G/DL (ref 1.5–4.5)
GLUCOSE SERPL-MCNC: 97 MG/DL (ref 65–99)
HBA1C MFR BLD: 5.1 % (ref 4.8–5.6)
HDLC SERPL-MCNC: 48 MG/DL
LABORATORY COMMENT REPORT: ABNORMAL
LDLC SERPL CALC-MCNC: 137 MG/DL (ref 0–99)
POTASSIUM SERPL-SCNC: 4.3 MMOL/L (ref 3.5–5.2)
PROT SERPL-MCNC: 6.6 G/DL (ref 6–8.5)
SODIUM SERPL-SCNC: 139 MMOL/L (ref 134–144)
TRIGL SERPL-MCNC: 184 MG/DL (ref 0–149)
VLDLC SERPL CALC-MCNC: 33 MG/DL (ref 5–40)

## 2022-10-03 ENCOUNTER — HOSPITAL ENCOUNTER (OUTPATIENT)
Dept: LAB | Facility: MEDICAL CENTER | Age: 47
End: 2022-10-03
Attending: INTERNAL MEDICINE
Payer: COMMERCIAL

## 2022-10-03 ENCOUNTER — OFFICE VISIT (OUTPATIENT)
Dept: MEDICAL GROUP | Facility: PHYSICIAN GROUP | Age: 47
End: 2022-10-03
Payer: COMMERCIAL

## 2022-10-03 VITALS
TEMPERATURE: 98.4 F | SYSTOLIC BLOOD PRESSURE: 124 MMHG | DIASTOLIC BLOOD PRESSURE: 82 MMHG | WEIGHT: 263 LBS | BODY MASS INDEX: 41.28 KG/M2 | HEART RATE: 55 BPM | OXYGEN SATURATION: 96 % | RESPIRATION RATE: 16 BRPM | HEIGHT: 67 IN

## 2022-10-03 DIAGNOSIS — R23.2 HOT FLASHES: ICD-10-CM

## 2022-10-03 DIAGNOSIS — I10 PRIMARY HYPERTENSION: Chronic | ICD-10-CM

## 2022-10-03 DIAGNOSIS — Z12.31 ENCOUNTER FOR SCREENING MAMMOGRAM FOR BREAST CANCER: ICD-10-CM

## 2022-10-03 DIAGNOSIS — G45.9 TIA (TRANSIENT ISCHEMIC ATTACK): Chronic | ICD-10-CM

## 2022-10-03 DIAGNOSIS — E78.5 DYSLIPIDEMIA: Chronic | ICD-10-CM

## 2022-10-03 DIAGNOSIS — E66.9 OBESITY (BMI 35.0-39.9 WITHOUT COMORBIDITY): Chronic | ICD-10-CM

## 2022-10-03 PROBLEM — Z90.710 STATUS POST HYSTERECTOMY: Status: ACTIVE | Noted: 2022-10-03

## 2022-10-03 LAB — FSH SERPL-ACNC: 77.5 MIU/ML

## 2022-10-03 PROCEDURE — 99204 OFFICE O/P NEW MOD 45 MIN: CPT | Performed by: INTERNAL MEDICINE

## 2022-10-03 PROCEDURE — 83001 ASSAY OF GONADOTROPIN (FSH): CPT

## 2022-10-03 PROCEDURE — 36415 COLL VENOUS BLD VENIPUNCTURE: CPT

## 2022-10-03 RX ORDER — ATORVASTATIN CALCIUM 20 MG/1
20 TABLET, FILM COATED ORAL DAILY
Qty: 90 TABLET | Refills: 3 | Status: SHIPPED | OUTPATIENT
Start: 2022-10-03 | End: 2023-02-27

## 2022-10-03 RX ORDER — OMEPRAZOLE 20 MG/1
20 CAPSULE, DELAYED RELEASE ORAL DAILY
COMMUNITY
End: 2022-11-09

## 2022-10-03 ASSESSMENT — FIBROSIS 4 INDEX: FIB4 SCORE: 0.67

## 2022-10-03 NOTE — ASSESSMENT & PLAN NOTE
Chronic condition for the patient is taking atorvastatin 10 mg daily.  Recent lipid panel result discussed with the patient.

## 2022-10-03 NOTE — ASSESSMENT & PLAN NOTE
Chronic stable condition.  The patient is on lisinopril and metoprolol.  Her blood pressure has been well controlled.  No significant side effects reported.

## 2022-10-03 NOTE — ASSESSMENT & PLAN NOTE
Body mass index is 41.19 kg/m².   Brief discussion with the patient regarding diet, exercise, and lifestyle modification to help achieve and maintain healthy weight

## 2022-10-03 NOTE — PROGRESS NOTES
PRIMARY CARE CLINIC VISIT  Chief Complaint   Patient presents with    New Patient     Follow-up hypertension  Lab test results  Medical condition discussion    History of Present Illness     Obesity (BMI 35.0-39.9 without comorbidity)  Body mass index is 41.19 kg/m².   Brief discussion with the patient regarding diet, exercise, and lifestyle modification to help achieve and maintain healthy weight              TIA (transient ischemic attack)  Patient reported history of TIA 2020.  Patient currently asymptomatic.  Patient takes aspirin daily.    Dyslipidemia  Chronic condition for the patient is taking atorvastatin 10 mg daily.  Recent lipid panel result discussed with the patient.    HTN (hypertension)  Chronic stable condition.  The patient is on lisinopril and metoprolol.  Her blood pressure has been well controlled.  No significant side effects reported.    Hot flashes  This is a new condition noted since last several months.  Patient is concerned of possible menopause.  She is requesting a lab test to check on this condition    Current Outpatient Medications on File Prior to Visit   Medication Sig Dispense Refill    omeprazole (PRILOSEC) 20 MG delayed-release capsule Take 20 mg by mouth every day.      lisinopril (PRINIVIL) 10 MG Tab TAKE 1 TABLET BY MOUTH EVERY DAY 90 Tablet 0    metoprolol SR (TOPROL XL) 25 MG TABLET SR 24 HR TAKE 1 TABLET BY MOUTH EVERY DAY 90 Tablet 0    ASPIRIN LOW DOSE 81 MG Chew Tab chewable tablet CHEW 1 TABLET EVERY DAY. CHEW AND SWALLOW 90 Tablet 1     No current facility-administered medications on file prior to visit.        Allergies: Patient has no known allergies.    ROS  As per HPI above. All other systems reviewed and negative.      Past Medical, Social, and Family history reviewed and updated in EPIC     Objective     /82 (BP Location: Left arm, Patient Position: Sitting, BP Cuff Size: Adult)   Pulse (!) 55   Temp 36.9 °C (98.4 °F) (Temporal)   Resp 16   Ht 1.702 m  "(5' 7\")   Wt 119 kg (263 lb)   SpO2 96%    Body mass index is 41.19 kg/m².    General: alert in no apparent distress.  Cardiovascular: regular rate and rhythm  Pulmonary: lungs : no wheezing   Gastrointestinal: BS present. No obvious mass noted          Assessment and Plan     1. TIA (transient ischemic attack)  History of TIA 2020.  No residual symptoms.  Advised the patient to continue with aspirin daily.  2. Primary hypertension  Chronic stable condition.  Continue with lisinopril and metoprolol.  BP today 124/82.  Continue to monitor her blood pressure on regular basis at home.  3. Dyslipidemia  Uncontrolled.  Recommend to increase atorvastatin from 10 Mg daily to 20 Mg daily.  Recommend diet and exercise.  4. Obesity (BMI 35.0-39.9 without comorbidity)  Chronic condition.  The patient stated that she has tried diet and exercise without improvement.  Refer the patient to the medical weight loss program  5. Hot flashes  - FSH; Future    6. Encounter for screening mammogram for breast cancer  - MA-SCREENING MAMMO BILAT W/CAD; Future    Other orders  - omeprazole (PRILOSEC) 20 MG delayed-release capsule; Take 20 mg by mouth every day.  - atorvastatin (LIPITOR) 20 MG Tab; Take 1 Tablet by mouth every day.  Dispense: 90 Tablet; Refill: 3    Follow-up 4 months                      Please note that this dictation was created using voice recognition software. I have made every reasonable attempt to correct obvious errors, but I expect that there are errors of grammar and possibly content that I did not discover before finalizing the note.    Flavio Junior MD  Internal Medicine  [facility] care [facility]  "

## 2022-10-03 NOTE — ASSESSMENT & PLAN NOTE
Patient reported history of TIA 2020.  Patient currently asymptomatic.  Patient takes aspirin daily.

## 2022-10-03 NOTE — ASSESSMENT & PLAN NOTE
This is a new condition noted since last several months.  Patient is concerned of possible menopause.  She is requesting a lab test to check on this condition

## 2022-11-07 ENCOUNTER — HOSPITAL ENCOUNTER (OUTPATIENT)
Dept: RADIOLOGY | Facility: MEDICAL CENTER | Age: 47
End: 2022-11-07
Attending: INTERNAL MEDICINE
Payer: COMMERCIAL

## 2022-11-07 DIAGNOSIS — Z12.31 VISIT FOR SCREENING MAMMOGRAM: ICD-10-CM

## 2022-11-07 PROCEDURE — 77063 BREAST TOMOSYNTHESIS BI: CPT

## 2022-11-09 DIAGNOSIS — I10 ESSENTIAL HYPERTENSION: ICD-10-CM

## 2022-11-09 RX ORDER — LISINOPRIL 10 MG/1
10 TABLET ORAL DAILY
Qty: 90 TABLET | Refills: 0 | Status: CANCELLED | OUTPATIENT
Start: 2022-11-09

## 2022-11-09 RX ORDER — OMEPRAZOLE 20 MG/1
20 CAPSULE, DELAYED RELEASE ORAL DAILY
Qty: 30 CAPSULE | OUTPATIENT
Start: 2022-11-09

## 2022-11-09 RX ORDER — LISINOPRIL 10 MG/1
10 TABLET ORAL DAILY
Qty: 90 TABLET | Refills: 3 | Status: SHIPPED | OUTPATIENT
Start: 2022-11-09 | End: 2023-11-13

## 2022-11-10 NOTE — TELEPHONE ENCOUNTER
Received request via: Patient    Was the patient seen in the last year in this department? Yes    Does the patient have an active prescription (recently filled or refills available) for medication(s) requested? No    Does the patient have snf Plus and need 100 day supply (blood pressure, diabetes and cholesterol meds only)? Medication is not for cholesterol, blood pressure or diabetes

## 2023-02-01 DIAGNOSIS — I10 PRIMARY HYPERTENSION: Chronic | ICD-10-CM

## 2023-02-01 DIAGNOSIS — E78.5 DYSLIPIDEMIA: Chronic | ICD-10-CM

## 2023-02-15 ENCOUNTER — HOSPITAL ENCOUNTER (OUTPATIENT)
Dept: LAB | Facility: MEDICAL CENTER | Age: 48
End: 2023-02-15
Attending: INTERNAL MEDICINE
Payer: COMMERCIAL

## 2023-02-15 DIAGNOSIS — I10 PRIMARY HYPERTENSION: Chronic | ICD-10-CM

## 2023-02-15 DIAGNOSIS — E78.5 DYSLIPIDEMIA: Chronic | ICD-10-CM

## 2023-02-15 LAB
ALT SERPL-CCNC: 15 U/L (ref 2–50)
ANION GAP SERPL CALC-SCNC: 11 MMOL/L (ref 7–16)
BUN SERPL-MCNC: 14 MG/DL (ref 8–22)
CALCIUM SERPL-MCNC: 9.8 MG/DL (ref 8.5–10.5)
CHLORIDE SERPL-SCNC: 107 MMOL/L (ref 96–112)
CHOLEST SERPL-MCNC: 168 MG/DL (ref 100–199)
CO2 SERPL-SCNC: 24 MMOL/L (ref 20–33)
CREAT SERPL-MCNC: 0.94 MG/DL (ref 0.5–1.4)
FASTING STATUS PATIENT QL REPORTED: NORMAL
GFR SERPLBLD CREATININE-BSD FMLA CKD-EPI: 75 ML/MIN/1.73 M 2
GLUCOSE SERPL-MCNC: 89 MG/DL (ref 65–99)
HDLC SERPL-MCNC: 43 MG/DL
LDLC SERPL CALC-MCNC: 103 MG/DL
POTASSIUM SERPL-SCNC: 4.5 MMOL/L (ref 3.6–5.5)
SODIUM SERPL-SCNC: 142 MMOL/L (ref 135–145)
TRIGL SERPL-MCNC: 109 MG/DL (ref 0–149)

## 2023-02-15 PROCEDURE — 80061 LIPID PANEL: CPT

## 2023-02-15 PROCEDURE — 80048 BASIC METABOLIC PNL TOTAL CA: CPT

## 2023-02-15 PROCEDURE — 83036 HEMOGLOBIN GLYCOSYLATED A1C: CPT

## 2023-02-15 PROCEDURE — 36415 COLL VENOUS BLD VENIPUNCTURE: CPT

## 2023-02-15 PROCEDURE — 84460 ALANINE AMINO (ALT) (SGPT): CPT

## 2023-02-16 LAB
EST. AVERAGE GLUCOSE BLD GHB EST-MCNC: 103 MG/DL
HBA1C MFR BLD: 5.2 % (ref 4–5.6)

## 2023-02-27 ENCOUNTER — OFFICE VISIT (OUTPATIENT)
Dept: MEDICAL GROUP | Facility: PHYSICIAN GROUP | Age: 48
End: 2023-02-27
Payer: COMMERCIAL

## 2023-02-27 VITALS
OXYGEN SATURATION: 94 % | SYSTOLIC BLOOD PRESSURE: 132 MMHG | DIASTOLIC BLOOD PRESSURE: 80 MMHG | HEART RATE: 88 BPM | RESPIRATION RATE: 16 BRPM | WEIGHT: 259.5 LBS | HEIGHT: 67 IN | BODY MASS INDEX: 40.73 KG/M2 | TEMPERATURE: 98.3 F

## 2023-02-27 DIAGNOSIS — I10 ESSENTIAL HYPERTENSION: ICD-10-CM

## 2023-02-27 DIAGNOSIS — I10 PRIMARY HYPERTENSION: Chronic | ICD-10-CM

## 2023-02-27 DIAGNOSIS — G45.9 TIA (TRANSIENT ISCHEMIC ATTACK): Chronic | ICD-10-CM

## 2023-02-27 DIAGNOSIS — E66.9 OBESITY (BMI 35.0-39.9 WITHOUT COMORBIDITY): Chronic | ICD-10-CM

## 2023-02-27 DIAGNOSIS — E78.5 DYSLIPIDEMIA: Chronic | ICD-10-CM

## 2023-02-27 DIAGNOSIS — K21.9 GASTROESOPHAGEAL REFLUX DISEASE WITHOUT ESOPHAGITIS: ICD-10-CM

## 2023-02-27 PROCEDURE — 99214 OFFICE O/P EST MOD 30 MIN: CPT | Performed by: INTERNAL MEDICINE

## 2023-02-27 RX ORDER — METOPROLOL SUCCINATE 25 MG/1
25 TABLET, EXTENDED RELEASE ORAL DAILY
Qty: 90 TABLET | Refills: 3 | Status: SHIPPED | OUTPATIENT
Start: 2023-02-27 | End: 2024-02-22

## 2023-02-27 RX ORDER — ATORVASTATIN CALCIUM 40 MG/1
40 TABLET, FILM COATED ORAL DAILY
Qty: 90 TABLET | Refills: 3 | Status: SHIPPED | OUTPATIENT
Start: 2023-02-27 | End: 2024-02-22

## 2023-02-27 ASSESSMENT — FIBROSIS 4 INDEX: FIB4 SCORE: 0.69

## 2023-02-27 ASSESSMENT — PATIENT HEALTH QUESTIONNAIRE - PHQ9: CLINICAL INTERPRETATION OF PHQ2 SCORE: 0

## 2023-02-28 NOTE — ASSESSMENT & PLAN NOTE
Chronic ongoing condition.  The patient takes Nexium 20 mg daily.  The patient denies nausea vomiting dysphagia or unexplained weight loss.

## 2023-02-28 NOTE — ASSESSMENT & PLAN NOTE
Chronic condition.  Currently patient takes atorvastatin 20 Mg daily.  No significant side effects reported.  The recent blood test result discussed with the patient.  LDL level is suboptimal

## 2023-02-28 NOTE — ASSESSMENT & PLAN NOTE
Chronic condition.    Body mass index is 40.64 kg/m².   Brief discussion with the patient regarding diet, exercise, and lifestyle modification to help achieve and maintain healthy weight  - on weight loss

## 2023-02-28 NOTE — ASSESSMENT & PLAN NOTE
Chronic ongoing condition.  The patient take lisinopril 10 mg daily and metoprolol 25 mg daily.  Blood pressure has been well controlled at home.  No significant side effects reported.

## 2023-02-28 NOTE — ASSESSMENT & PLAN NOTE
Patient with TIA diagnosed in 2020.  Currently she take aspirin 81 mg daily.  She also take atorvastatin 20 Mg daily.  Recent LDL was still suboptimal with LDL above 100.  Lab test result discussed with the patient.

## 2023-02-28 NOTE — PROGRESS NOTES
PRIMARY CARE CLINIC VISIT    Chief complaint:    Follow-up hypertension  Hyperlipidemia  Lab test results  Prescription refills  Obesity  TIA        History of Present Illness     TIA (transient ischemic attack)  Patient with TIA diagnosed in 2020.  Currently she take aspirin 81 mg daily.  She also take atorvastatin 20 Mg daily.  Recent LDL was still suboptimal with LDL above 100.  Lab test result discussed with the patient.    Dyslipidemia  Chronic condition.  Currently patient takes atorvastatin 20 Mg daily.  No significant side effects reported.  The recent blood test result discussed with the patient.  LDL level is suboptimal    HTN (hypertension)  Chronic ongoing condition.  The patient take lisinopril 10 mg daily and metoprolol 25 mg daily.  Blood pressure has been well controlled at home.  No significant side effects reported.    Obesity (BMI 35.0-39.9 without comorbidity)  Chronic condition.    Body mass index is 40.64 kg/m².   Brief discussion with the patient regarding diet, exercise, and lifestyle modification to help achieve and maintain healthy weight  - on weight loss            GERD (gastroesophageal reflux disease)  Chronic ongoing condition.  The patient takes Nexium 20 mg daily.  The patient denies nausea vomiting dysphagia or unexplained weight loss.    Current Outpatient Medications on File Prior to Visit   Medication Sig Dispense Refill    lisinopril (PRINIVIL) 10 MG Tab Take 1 Tablet by mouth every day. 90 Tablet 3    esomeprazole (NEXIUM) 20 MG capsule Take 1 Capsule by mouth every morning before breakfast. 90 Capsule 3    ASPIRIN LOW DOSE 81 MG Chew Tab chewable tablet CHEW 1 TABLET EVERY DAY. CHEW AND SWALLOW 90 Tablet 1     No current facility-administered medications on file prior to visit.        Allergies: Patient has no known allergies.    Current Outpatient Medications Ordered in Epic   Medication Sig Dispense Refill    atorvastatin (LIPITOR) 40 MG Tab Take 1 Tablet by mouth  every day. 90 Tablet 3    metoprolol SR (TOPROL XL) 25 MG TABLET SR 24 HR Take 1 Tablet by mouth every day. 90 Tablet 3    lisinopril (PRINIVIL) 10 MG Tab Take 1 Tablet by mouth every day. 90 Tablet 3    esomeprazole (NEXIUM) 20 MG capsule Take 1 Capsule by mouth every morning before breakfast. 90 Capsule 3    ASPIRIN LOW DOSE 81 MG Chew Tab chewable tablet CHEW 1 TABLET EVERY DAY. CHEW AND SWALLOW 90 Tablet 1     No current Epic-ordered facility-administered medications on file.       Past Medical History:   Diagnosis Date    Depression     HTN     resolved 8/10    Pain 01-24-14    abd., 0/10    Pain     right hip    Renal disorder     right nephrectomy 2010    S/p nephrectomy 2010    right removed       Past Surgical History:   Procedure Laterality Date    HIP ARTHROSCOPY  12/18/2014    Performed by Benji Lott M.D. at SURGERY [facility]    FEMORAL NECK OSTEOPLASTY  12/18/2014    Performed by Benji Lott M.D. at [facility]    ACETABULAR OSTEOPLASTY  12/18/2014    Performed by Benji Lott M.D. at [facility]    SYNOVECTOMY  12/18/2014    Performed by Benji Lott M.D. at [facility]    LAPAROSCOPY  1/27/2014    Performed by Ihsan Dior M.D. at SURGERY [facility]    LYSIS ADHESIONS GENERAL  1/27/2014    Performed by Ihsan Dior M.D. at [facility]    CASSANDRA BY LAPAROSCOPY  12/11/2013    Performed by Ihsan Dior M.D. at [facility]    CHOLECYSTECTOMY  2013    NEPHRECTOMY LAPAROSCOPIC  2010    Right- Performed by NINA CORREA at SURGERY [facility]    [place] NEPHRECTOMY  2010    CYSTOSCOPY STENT PLACEMENT  8/11/2009    Performed by KIMBERLY PALACIOS at SURGERY [facility]    URETEROSCOPY  8/11/2009    Performed by KIMBERLY PALACIOS at SURGERY [facility]    STONE RETRIEVAL  8/11/2009    Performed by KIMBERLY PALACIOS at [facility]    STENT REMOVAL  8/11/2009  "   Performed by KIMBERLY PALACIOS at SURGERY [facility]    LAPAROTOMY  2009    bladder and ureter reconstruction    URETERAL REIMPLANTATION  4/23/08    Performed by KIMBERLY PALACIOS at SURGERY [facility]    STENT PLACEMENT  4/23/08    Performed by KIMBERLY PALACIOS at SURGERY [facility]    LYSIS ADHESIONS GENERAL  5/06    ABDOMINAL HYSTERECTOMY TOTAL  2005    Hysterectomy, Total Abdominal    LAPAROTOMY  2005    TONSILLECTOMY  2004    APPENDECTOMY  1991    IR-URETERAL STENT ANTEGRADE      R side    OTHER  [id number]    \"multiple procedures prior to nephrectom , stent placement/ nephrostomy tube     URETEROSCOPY      partial removal of ureter with reimplantation       Family History   Problem Relation Age of Onset    Hypertension Mother     Hypertension Father     Hypertension Brother     Genitourinary () Problems Brother         kidney stones       Social History     Tobacco Use   Smoking Status Never   Smokeless Tobacco Never       Social History     Substance and Sexual Activity   Alcohol Use Not Currently    Alcohol/week: 0.0 oz    Comment: 2 per month       Review of systems.  As per HPI above. All other systems reviewed and negative.      Past Medical, Social, and Family history reviewed and updated in EPIC     Objective     /80 (BP Location: Left arm, Patient Position: Sitting, BP Cuff Size: Large adult)   Pulse 88   Temp 36.8 °C (98.3 °F) (Temporal)   Resp 16   Ht 1.702 m (5' 7\")   Wt 118 kg (259 lb 8 oz)   SpO2 94%    Body mass index is 40.64 kg/m².    General: alert in no apparent distress.  Cardiovascular: regular rate and rhythm  Pulmonary: lungs : no wheezing   Gastrointestinal: BS present. No obvious mass noted  Cranial nerves II to XII grossly intact mental status and speech appropriate gait no ataxia noted      Lab Results   Component Value Date/Time    HBA1C 5.2 02/15/2023 02:43 PM    HBA1C 5.1 08/29/2022 11:07 AM    HBA1C 5.4 04/28/2022 10:18 AM    HBA1C 5.1 10/29/2020 " 02:00 PM       Lab Results   Component Value Date/Time    WBC 7.1 04/28/2022 10:18 AM    WBC 7.1 07/22/2021 11:44 AM    HEMOGLOBIN 14.1 04/28/2022 10:18 AM    HEMOGLOBIN 14.0 07/22/2021 11:44 AM    HEMATOCRIT 41.4 04/28/2022 10:18 AM    HEMATOCRIT 42.1 07/22/2021 11:44 AM    MCV 89 04/28/2022 10:18 AM    MCV 90.3 07/22/2021 11:44 AM    PLATELETCT 280 04/28/2022 10:18 AM    PLATELETCT 247 07/22/2021 11:44 AM         Lab Results   Component Value Date/Time    SODIUM 142 02/15/2023 02:43 PM    POTASSIUM 4.5 02/15/2023 02:43 PM    GLUCOSE 89 02/15/2023 02:43 PM    BUN 14 02/15/2023 02:43 PM    CREATININE 0.94 02/15/2023 02:43 PM    CREATININE 0.8 04/17/2009 03:38 PM    BUNCREATRAT 17 08/29/2022 11:07 AM       Lab Results   Component Value Date/Time    CHOLSTRLTOT 168 02/15/2023 02:43 PM     (H) 02/15/2023 02:43 PM    HDL 43 02/15/2023 02:43 PM    TRIGLYCERIDE 109 02/15/2023 02:43 PM       Lab Results   Component Value Date/Time    ALTSGPT 15 02/15/2023 02:43 PM             Assessment and Plan     1. TIA (transient ischemic attack)  Chronic condition.  Advised the patient to continue with aspirin 81 mg daily.  Continue with lisinopril and metoprolol.  Stressed importance of good blood pressure control.    2. Essential hypertension  Chronic stable condition.  Continue with lisinopril 10 mg daily and metoprolol 25 mg daily.  Continue to monitor blood pressure regular basis at home.  Sodium restriction is advised.  - metoprolol SR (TOPROL XL) 25 MG TABLET SR 24 HR; Take 1 Tablet by mouth every day.  Dispense: 90 Tablet; Refill: 3    3. Dyslipidemia  Chronic condition.  Uncontrolled.  Due to underlying TIA recommend patient to try to get the LDL below 100.  Recommended for the patient to increase the dosage of atorvastatin from 20 Mg to 40 Mg daily.  Recommend follow-up in 3 to 4 months with lab test prior  - Lipid Profile; Future    4. Obesity (BMI 35.0-39.9 without comorbidity)  Chronic condition.  Uncontrolled.   Recommend healthy diet and exercise.  Encourage weight loss.    5. Gastroesophageal reflux disease without esophagitis  Chronic stable condition.  Continue with Nexium.  Antireflux precaution discussed with patient.                      Please note that this dictation was created using voice recognition software. I have made every reasonable attempt to correct obvious errors, but I expect that there are errors of grammar and possibly content that I did not discover before finalizing the note.    Flavio Junior MD  Internal Medicine  [facility]

## 2023-03-15 RX ORDER — ASPIRIN 81 MG/1
TABLET, CHEWABLE ORAL
Qty: 90 TABLET | Refills: 1 | Status: SHIPPED | OUTPATIENT
Start: 2023-03-15 | End: 2023-10-03

## 2023-04-05 ENCOUNTER — OFFICE VISIT (OUTPATIENT)
Dept: URGENT CARE | Facility: PHYSICIAN GROUP | Age: 48
End: 2023-04-05
Payer: COMMERCIAL

## 2023-04-05 ENCOUNTER — HOSPITAL ENCOUNTER (OUTPATIENT)
Facility: MEDICAL CENTER | Age: 48
End: 2023-04-05
Attending: PHYSICIAN ASSISTANT
Payer: COMMERCIAL

## 2023-04-05 VITALS
SYSTOLIC BLOOD PRESSURE: 136 MMHG | HEART RATE: 103 BPM | OXYGEN SATURATION: 97 % | TEMPERATURE: 97.6 F | HEIGHT: 67 IN | WEIGHT: 255 LBS | DIASTOLIC BLOOD PRESSURE: 84 MMHG | RESPIRATION RATE: 18 BRPM | BODY MASS INDEX: 40.02 KG/M2

## 2023-04-05 DIAGNOSIS — R30.0 DYSURIA: ICD-10-CM

## 2023-04-05 DIAGNOSIS — N39.0 URINARY TRACT INFECTION WITHOUT HEMATURIA, SITE UNSPECIFIED: ICD-10-CM

## 2023-04-05 LAB
APPEARANCE UR: ABNORMAL
BILIRUB UR STRIP-MCNC: ABNORMAL MG/DL
COLOR UR AUTO: ABNORMAL
GLUCOSE UR STRIP.AUTO-MCNC: ABNORMAL MG/DL
KETONES UR STRIP.AUTO-MCNC: ABNORMAL MG/DL
LEUKOCYTE ESTERASE UR QL STRIP.AUTO: ABNORMAL
NITRITE UR QL STRIP.AUTO: POSITIVE
PH UR STRIP.AUTO: 5.5 [PH] (ref 5–8)
PROT UR QL STRIP: 30 MG/DL
RBC UR QL AUTO: ABNORMAL
SP GR UR STRIP.AUTO: 1.01
UROBILINOGEN UR STRIP-MCNC: 0.2 MG/DL

## 2023-04-05 PROCEDURE — 87077 CULTURE AEROBIC IDENTIFY: CPT

## 2023-04-05 PROCEDURE — 99213 OFFICE O/P EST LOW 20 MIN: CPT | Performed by: PHYSICIAN ASSISTANT

## 2023-04-05 PROCEDURE — 87086 URINE CULTURE/COLONY COUNT: CPT

## 2023-04-05 PROCEDURE — 87186 SC STD MICRODIL/AGAR DIL: CPT

## 2023-04-05 PROCEDURE — 81002 URINALYSIS NONAUTO W/O SCOPE: CPT | Performed by: PHYSICIAN ASSISTANT

## 2023-04-05 RX ORDER — CEFDINIR 300 MG/1
300 CAPSULE ORAL 2 TIMES DAILY
Qty: 14 CAPSULE | Refills: 0 | Status: SHIPPED | OUTPATIENT
Start: 2023-04-05 | End: 2023-04-12

## 2023-04-05 RX ORDER — PHENAZOPYRIDINE HYDROCHLORIDE 200 MG/1
200 TABLET, FILM COATED ORAL 3 TIMES DAILY PRN
Qty: 6 TABLET | Refills: 0 | Status: SHIPPED | OUTPATIENT
Start: 2023-04-05

## 2023-04-05 ASSESSMENT — ENCOUNTER SYMPTOMS
ABDOMINAL PAIN: 1
BACK PAIN: 1
VOMITING: 0
FLANK PAIN: 0
NAUSEA: 1
EYE DISCHARGE: 0
EYE REDNESS: 0

## 2023-04-05 ASSESSMENT — FIBROSIS 4 INDEX: FIB4 SCORE: 0.71

## 2023-04-06 DIAGNOSIS — R30.0 DYSURIA: ICD-10-CM

## 2023-04-06 NOTE — PROGRESS NOTES
Subjective     Leslie Nazario is a 48 y.o. female who presents with Painful Urination (X 1 day, Lower abd px, lower back px)            Dysuria   This is a new problem. Episode onset: x last night. The problem has been unchanged. The quality of the pain is described as burning. There has been no fever. There is A history of pyelonephritis. Associated symptoms include frequency and nausea. Pertinent negatives include no flank pain, hematuria, urgency or vomiting. She has tried acetaminophen (OTC AZO) for the symptoms. Her past medical history is significant for kidney stones.     The patient reports a history of frequent UTIs.    PMH:  has a past medical history of Depression, HTN, Pain (01-24-14), Pain, Renal disorder, and S/p nephrectomy (2010).  MEDS:   Current Outpatient Medications:     aspirin (ASPIRIN LOW DOSE) 81 MG Chew Tab chewable tablet, CHEW 1 TABLET EVERY DAY. CHEW AND SWALLOW, Disp: 90 Tablet, Rfl: 1    atorvastatin (LIPITOR) 40 MG Tab, Take 1 Tablet by mouth every day., Disp: 90 Tablet, Rfl: 3    metoprolol SR (TOPROL XL) 25 MG TABLET SR 24 HR, Take 1 Tablet by mouth every day., Disp: 90 Tablet, Rfl: 3    lisinopril (PRINIVIL) 10 MG Tab, Take 1 Tablet by mouth every day., Disp: 90 Tablet, Rfl: 3    esomeprazole (NEXIUM) 20 MG capsule, Take 1 Capsule by mouth every morning before breakfast., Disp: 90 Capsule, Rfl: 3  ALLERGIES: No Known Allergies  SURGHX:   Past Surgical History:   Procedure Laterality Date    HIP ARTHROSCOPY  12/18/2014    Performed by Benji Lott M.D. at [facility]    FEMORAL NECK OSTEOPLASTY  12/18/2014    Performed by Benji Lott M.D. at [facility]    ACETABULAR OSTEOPLASTY  12/18/2014    Performed by Benji Lott M.D. at [facility]    SYNOVECTOMY  12/18/2014    Performed by Benji Lott M.D. at [facility]    LAPAROSCOPY  1/27/2014    Performed by Ihsan Dior M.D. at SURGERY  "[facility]    LYSIS ADHESIONS GENERAL  1/27/2014    Performed by Ihsan Dior M.D. at SURGERY [facility]    CASSANDRA BY LAPAROSCOPY  12/11/2013    Performed by Ihsan Dior M.D. at SURGERY [facility]    CHOLECYSTECTOMY  2013    NEPHRECTOMY LAPAROSCOPIC  2010    Right- Performed by NINA CORREA at SURGERY [facility]    [place] NEPHRECTOMY  2010    CYSTOSCOPY STENT PLACEMENT  8/11/2009    Performed by KIMBERLY PALACIOS at SURGERY [facility]    URETEROSCOPY  8/11/2009    Performed by KIMBERLY PALACIOS at SURGERY [facility]    STONE RETRIEVAL  8/11/2009    Performed by KIMBERLY PALACIOS at SURGERY [facility]    STENT REMOVAL  8/11/2009    Performed by KIMBERLY PALACIOS at SURGERY [facility]    LAPAROTOMY  2009    bladder and ureter reconstruction    URETERAL REIMPLANTATION  4/23/08    Performed by KIMBERLY PALACIOS at SURGERY [facility]    STENT PLACEMENT  4/23/08    Performed by KIMBERLY PALACIOS at SURGERY [facility]    LYSIS ADHESIONS GENERAL  5/06    ABDOMINAL HYSTERECTOMY TOTAL  2005    Hysterectomy, Total Abdominal    LAPAROTOMY  2005    TONSILLECTOMY  2004    APPENDECTOMY  1991    IR-URETERAL STENT ANTEGRADE      R side    OTHER  [id number]    \"multiple procedures prior to nephrectom , stent placement/ nephrostomy tube     URETEROSCOPY      partial removal of ureter with reimplantation     SOCHX:  reports that she has never smoked. She has never used smokeless tobacco. She reports that she does not currently use alcohol. She reports that she does not use drugs.  FH: Family history was reviewed, no pertinent findings to report      Review of Systems   Eyes:  Negative for discharge and redness.   Gastrointestinal:  Positive for abdominal pain (The patient reports suprapubic abdominal discomfort) and nausea. Negative for vomiting.   Genitourinary:  Positive for dysuria and frequency. Negative for flank pain, hematuria and urgency.   Musculoskeletal:  Positive " "for back pain.            Objective     /84   Pulse (!) 103   Temp 36.4 °C (97.6 °F) (Temporal)   Resp 18   Ht 1.702 m (5' 7\")   Wt 116 kg (255 lb)   SpO2 97%   BMI 39.94 kg/m²      Physical Exam  Constitutional:       General: She is not in acute distress.     Appearance: Normal appearance. She is well-developed. She is not ill-appearing.   HENT:      Head: Normocephalic and atraumatic.      Right Ear: External ear normal.      Left Ear: External ear normal.   Eyes:      Extraocular Movements: Extraocular movements intact.      Conjunctiva/sclera: Conjunctivae normal.   Cardiovascular:      Rate and Rhythm: Normal rate and regular rhythm.      Heart sounds: Normal heart sounds.   Pulmonary:      Effort: Pulmonary effort is normal. No respiratory distress.      Breath sounds: Normal breath sounds. No wheezing.   Abdominal:      Palpations: Abdomen is soft.      Tenderness: There is abdominal tenderness in the suprapubic area. There is no right CVA tenderness or left CVA tenderness.   Musculoskeletal:         General: Normal range of motion.      Cervical back: Normal range of motion and neck supple.   Skin:     General: Skin is warm and dry.   Neurological:      Mental Status: She is alert and oriented to person, place, and time.             Progress:    Results for orders placed or performed in visit on 04/05/23   POCT Urinalysis   Result Value Ref Range    POC Color ORANGE Negative    POC Appearance SLIGHTLY CLOUDY Negative    POC Glucose NEG Negative mg/dL    POC Bilirubin NEG Negative mg/dL    POC Ketones TRACE Negative mg/dL    POC Specific Gravity 1.015 <1.005 - >1.030    POC Blood LARGE Negative    POC Urine PH 5.5 5.0 - 8.0    POC Protein 30 Negative mg/dL    POC Urobiligen 0.2 Negative (0.2) mg/dL    POC Nitrites POSITIVE Negative    POC Leukocyte Esterase SMALL Negative         Urine Cutlure - pending     Ceftriaxone 1000mg IM given in clinic.   The patient has tolerated this medication in the " past without side effects.             Assessment & Plan          1. Dysuria  - POCT Urinalysis  - URINE CULTURE(NEW); Future  - phenazopyridine (PYRIDIUM) 200 MG Tab; Take 1 Tablet by mouth 3 times a day as needed for Severe Pain.  Dispense: 6 Tablet; Refill: 0    2. Urinary tract infection without hematuria, site unspecified  - cefTRIAXone (Rocephin) 1 g, lidocaine (XYLOCAINE) 1 % 3.6 mL for IM use  - cefdinir (OMNICEF) 300 MG Cap; Take 1 Capsule by mouth 2 times a day for 7 days.  Dispense: 14 Capsule; Refill: 0    The patient's presenting symptoms and physical exam findings are consistent with dysuria likely secondary to an acute urinary tract infection.  The patient's physical exam today in clinic was normal, with the exception of mild suprapubic abdominal tenderness.  No CVA tenderness was appreciated.  The patient is nontoxic and appears in no acute distress.  The patient's vital signs are stable and within normal limits.  She is afebrile today in clinic.  The patient's POCT urinalysis today in clinic showed.  We will culture the patient's urine to identify a likely bacterial source.  The patient reports a history of significant UTIs and kidney infections requiring hospitalization.  The patient states that she has been given an injection of ceftriaxone in the past as a loading dose.  The patient states this has worked well to treat her infections without requiring hospitalization.  The patient was given ceftriaxone 1000 mg IM today in clinic for her acute UTI.  Will prescribe the patient cefdinir for outpatient treatment of her UTI.  Additionally, will prescribe the patient Pyridium for symptomatic relief of her dysuria.  Advised the patient to monitor for worsening signs or symptoms.  Recommend OTC medications and supportive care for symptomatic management.  Recommend the patient follow-up with PCP as needed.  Discussed tricked return precautions with the patient, and she verbalized  understanding.    Differential diagnoses, supportive care, and indications for immediate follow-up discussed with patient.   Instructed to return to clinic or nearest emergency department for any change in condition, further concerns, or worsening of symptoms.    OTC Tylenol or Motrin for fever/discomfort.  Drink plenty of fluids  Follow-up with PCP  Return to clinic or go to the ED if symptoms worsen or fail to improve, or if the patient should develop worsening/increasing urinary symptoms, hematuria, flank pain, abdominal pain, nausea/vomiting, fever/chills, and/or any concerning symptoms.    Discussed plan with the patient, and she agrees to the above.     I personally reviewed prior external notes and test results pertinent to today's visit.  I have independently reviewed and interpreted all diagnostics ordered during this urgent care visit.     Please note that this dictation was created using voice recognition software. I have made every reasonable attempt to correct obvious errors, but I expect that there may be errors of grammar and possibly content that I did not discover before finalizing the note.     This note was electronically signed by Shruthi Trejo PA-C

## 2023-04-18 ENCOUNTER — HOSPITAL ENCOUNTER (OUTPATIENT)
Dept: RADIOLOGY | Facility: MEDICAL CENTER | Age: 48
End: 2023-04-18
Attending: NURSE PRACTITIONER
Payer: COMMERCIAL

## 2023-04-18 ENCOUNTER — OFFICE VISIT (OUTPATIENT)
Dept: URGENT CARE | Facility: PHYSICIAN GROUP | Age: 48
End: 2023-04-18
Payer: COMMERCIAL

## 2023-04-18 ENCOUNTER — HOSPITAL ENCOUNTER (OUTPATIENT)
Facility: MEDICAL CENTER | Age: 48
End: 2023-04-18
Attending: NURSE PRACTITIONER
Payer: COMMERCIAL

## 2023-04-18 VITALS
WEIGHT: 250 LBS | RESPIRATION RATE: 18 BRPM | SYSTOLIC BLOOD PRESSURE: 140 MMHG | DIASTOLIC BLOOD PRESSURE: 80 MMHG | HEART RATE: 94 BPM | OXYGEN SATURATION: 98 % | HEIGHT: 67 IN | TEMPERATURE: 97.2 F | BODY MASS INDEX: 39.24 KG/M2

## 2023-04-18 DIAGNOSIS — R11.0 NAUSEA: ICD-10-CM

## 2023-04-18 DIAGNOSIS — Z87.442 HISTORY OF RENAL STONE: ICD-10-CM

## 2023-04-18 DIAGNOSIS — N10 ACUTE PYELONEPHRITIS: ICD-10-CM

## 2023-04-18 DIAGNOSIS — R31.9 HEMATURIA, UNSPECIFIED TYPE: ICD-10-CM

## 2023-04-18 DIAGNOSIS — R39.9 LOWER URINARY TRACT SYMPTOMS (LUTS): ICD-10-CM

## 2023-04-18 LAB
APPEARANCE UR: CLEAR
BILIRUB UR STRIP-MCNC: NORMAL MG/DL
COLOR UR AUTO: YELLOW
GLUCOSE UR STRIP.AUTO-MCNC: NORMAL MG/DL
KETONES UR STRIP.AUTO-MCNC: NORMAL MG/DL
LEUKOCYTE ESTERASE UR QL STRIP.AUTO: NORMAL
NITRITE UR QL STRIP.AUTO: NORMAL
PH UR STRIP.AUTO: 6.5 [PH] (ref 5–8)
PROT UR QL STRIP: NORMAL MG/DL
RBC UR QL AUTO: NORMAL
SP GR UR STRIP.AUTO: >=1.005
UROBILINOGEN UR STRIP-MCNC: 0.2 MG/DL

## 2023-04-18 PROCEDURE — 99214 OFFICE O/P EST MOD 30 MIN: CPT | Performed by: NURSE PRACTITIONER

## 2023-04-18 PROCEDURE — 74176 CT ABD & PELVIS W/O CONTRAST: CPT

## 2023-04-18 PROCEDURE — 81002 URINALYSIS NONAUTO W/O SCOPE: CPT | Performed by: NURSE PRACTITIONER

## 2023-04-18 PROCEDURE — 87086 URINE CULTURE/COLONY COUNT: CPT

## 2023-04-18 RX ORDER — CIPROFLOXACIN 500 MG/1
500 TABLET, FILM COATED ORAL 2 TIMES DAILY
Qty: 14 TABLET | Refills: 0 | Status: SHIPPED | OUTPATIENT
Start: 2023-04-18 | End: 2023-04-25

## 2023-04-18 RX ORDER — KETOROLAC TROMETHAMINE 30 MG/ML
30 INJECTION, SOLUTION INTRAMUSCULAR; INTRAVENOUS ONCE
Status: COMPLETED | OUTPATIENT
Start: 2023-04-18 | End: 2023-04-18

## 2023-04-18 RX ORDER — ONDANSETRON 4 MG/1
4 TABLET, ORALLY DISINTEGRATING ORAL ONCE
Status: COMPLETED | OUTPATIENT
Start: 2023-04-18 | End: 2023-04-18

## 2023-04-18 RX ADMIN — ONDANSETRON 4 MG: 4 TABLET, ORALLY DISINTEGRATING ORAL at 13:18

## 2023-04-18 RX ADMIN — KETOROLAC TROMETHAMINE 30 MG: 30 INJECTION, SOLUTION INTRAMUSCULAR; INTRAVENOUS at 13:17

## 2023-04-18 ASSESSMENT — ENCOUNTER SYMPTOMS
CHILLS: 1
NAUSEA: 1
ABDOMINAL PAIN: 1
BACK PAIN: 1
VOMITING: 0
DIARRHEA: 0
FLANK PAIN: 1
HEADACHES: 0
FEVER: 0
DIZZINESS: 0

## 2023-04-18 ASSESSMENT — FIBROSIS 4 INDEX: FIB4 SCORE: 0.71

## 2023-04-18 NOTE — PROGRESS NOTES
Subjective     Leslie Nazario is a 48 y.o. female who presents with UTI            HPI  New problem.  Patient is a 48-year-old female who presents with severe lower abdominal pain as well as burning with urination, urgency, frequency, left flank pain, and nausea.  She denies fever however has had some chills.  She denies diarrhea or seeing blood in her urine.  She was seen several weeks ago for similar symptoms but not quite this bad and was diagnosed with Bandar at which time she received Rocephin and cefdinir.  She completed those medications however the symptoms have now returned.  She is also reporting significant fatigue.  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    Patient has no known allergies.  Current Outpatient Medications on File Prior to Visit   Medication Sig Dispense Refill    phenazopyridine (PYRIDIUM) 200 MG Tab Take 1 Tablet by mouth 3 times a day as needed for Severe Pain. 6 Tablet 0    aspirin (ASPIRIN LOW DOSE) 81 MG Chew Tab chewable tablet CHEW 1 TABLET EVERY DAY. CHEW AND SWALLOW 90 Tablet 1    atorvastatin (LIPITOR) 40 MG Tab Take 1 Tablet by mouth every day. 90 Tablet 3    metoprolol SR (TOPROL XL) 25 MG TABLET SR 24 HR Take 1 Tablet by mouth every day. 90 Tablet 3    lisinopril (PRINIVIL) 10 MG Tab Take 1 Tablet by mouth every day. 90 Tablet 3    esomeprazole (NEXIUM) 20 MG capsule Take 1 Capsule by mouth every morning before breakfast. 90 Capsule 3     No current facility-administered medications on file prior to visit.     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     Employer: [employer]   Tobacco Use    Smoking  "status: Never    Smokeless tobacco: Never   Vaping Use    Vaping Use: Never used   Substance and Sexual Activity    Alcohol use: Not Currently     Alcohol/week: 0.0 oz     Comment: 2 per month    Drug use: No    Sexual activity: Yes     Partners: Male   Other Topics Concern    Not on file   Social History Narrative    Not on file     Social Determinants of Health     Financial Resource Strain: Not on file   Food Insecurity: Not on file   Transportation Needs: Not on file   Physical Activity: Not on file   Stress: Not on file   Social Connections: Not on file   Intimate Partner Violence: Not on file   Housing Stability: Not on file     Breast Cancer-related family history is not on file.      Review of Systems   Constitutional:  Positive for chills and malaise/fatigue. Negative for fever.   Cardiovascular:  Negative for chest pain.   Gastrointestinal:  Positive for abdominal pain and nausea. Negative for diarrhea and vomiting.   Genitourinary:  Positive for dysuria, flank pain, frequency and urgency. Negative for hematuria.   Musculoskeletal:  Positive for back pain.   Neurological:  Negative for dizziness and headaches.            Objective     BP (!) 140/80   Pulse 94   Temp 36.2 °C (97.2 °F)   Resp 18   Ht 1.702 m (5' 7\")   Wt 113 kg (250 lb)   SpO2 98%   BMI 39.16 kg/m²      Physical Exam  Vitals and nursing note reviewed.   Constitutional:       General: She is not in acute distress.     Appearance: She is well-developed. She is ill-appearing.   Cardiovascular:      Rate and Rhythm: Normal rate and regular rhythm.      Heart sounds: Normal heart sounds. No murmur heard.  Pulmonary:      Effort: Pulmonary effort is normal. No respiratory distress.      Breath sounds: Normal breath sounds.   Abdominal:      General: Bowel sounds are normal.      Palpations: Abdomen is soft.      Tenderness: There is abdominal tenderness in the suprapubic area. There is left CVA tenderness.   Musculoskeletal:         " General: Normal range of motion.      Comments: Moves all 4 extremities normally   Skin:     General: Skin is warm and dry.   Neurological:      Mental Status: She is alert and oriented to person, place, and time.   Psychiatric:         Behavior: Behavior normal.         Thought Content: Thought content normal.                           Assessment & Plan        1. Acute pyelonephritis  ketorolac (TORADOL) injection 30 mg    ondansetron (ZOFRAN ODT) dispertab 4 mg    cefTRIAXone (ROCEPHIN) 500 mg, lidocaine (XYLOCAINE) 1 % 2 mL for IM use    cefTRIAXone (ROCEPHIN) 500 mg, lidocaine (XYLOCAINE) 1 % 2 mL for IM use    ciprofloxacin (CIPRO) 500 MG Tab      2. Lower urinary tract symptoms (LUTS)  POCT Urinalysis      3. Nausea        4. Hematuria, unspecified type  CT-RENAL COLIC EVALUATION(A/P W/O)      5. History of renal stone          Patient with small leukocytes and trace blood in her urine.  Her behavior in the exam room is consistent with somebody who could possibly have a kidney stone as she is having a hard time finding comfort in any position.  She is given a gram of Rocephin, 30 mg of Toradol, and 4 mg of ondansetron orally.  CT for renal colic evaluation Negative, reviewed with patient.  If sent a prescription for her Cipro 500 mg for 7 days to her pharmacy.  Urine culture.  She is given strict emergency room precautions.

## 2023-04-19 LAB
AMBIGUOUS DTTM AMBI4: NORMAL
SIGNIFICANT IND 70042: NORMAL
SITE SITE: NORMAL
SOURCE SOURCE: NORMAL

## 2023-04-21 LAB
BACTERIA UR CULT: NORMAL
SIGNIFICANT IND 70042: NORMAL
SITE SITE: NORMAL
SOURCE SOURCE: NORMAL

## 2023-10-03 RX ORDER — ASPIRIN 81 MG/1
TABLET, CHEWABLE ORAL
Qty: 90 TABLET | Refills: 1 | Status: SHIPPED | OUTPATIENT
Start: 2023-10-03

## 2023-11-09 ENCOUNTER — OFFICE VISIT (OUTPATIENT)
Dept: MEDICAL GROUP | Facility: PHYSICIAN GROUP | Age: 48
End: 2023-11-09
Payer: COMMERCIAL

## 2023-11-09 VITALS
TEMPERATURE: 97.9 F | HEART RATE: 62 BPM | HEIGHT: 67 IN | WEIGHT: 245 LBS | OXYGEN SATURATION: 97 % | BODY MASS INDEX: 38.45 KG/M2 | RESPIRATION RATE: 18 BRPM

## 2023-11-09 DIAGNOSIS — L25.9 CONTACT DERMATITIS OF SCALP: ICD-10-CM

## 2023-11-09 PROCEDURE — 99213 OFFICE O/P EST LOW 20 MIN: CPT | Performed by: NURSE PRACTITIONER

## 2023-11-09 RX ORDER — CLOBETASOL PROPIONATE 0.46 MG/ML
1 SOLUTION TOPICAL 2 TIMES DAILY PRN
Qty: 50 ML | Refills: 1 | Status: SHIPPED | OUTPATIENT
Start: 2023-11-09 | End: 2024-03-19

## 2023-11-09 ASSESSMENT — FIBROSIS 4 INDEX: FIB4 SCORE: 0.71

## 2023-11-09 NOTE — PROGRESS NOTES
Chief Complaint   Patient presents with    Hair/Scalp Problem     2 weeks ago Itchiness, uses new shampoo, head felt like sunburn lasted 2 days, turned to itchiness, no dyes tried OTC scalp, nothing       HISTORY OF PRESENT ILLNESS: Leslie Nazario is a 48 y.o. female established patient of Dr Junior who presents today to discuss:  - scalp irritation from new shampoo she used 2 weeks ago. Had burning sensation on her scalp for 2 days, this has subsided. She now has dry itchy patches on her scalp. She tried OTC hydrocortisone but it seemed to make it worse.     Current Outpatient Medications on File Prior to Visit   Medication Sig Dispense Refill    aspirin (ASPIRIN LOW DOSE) 81 MG Chew Tab chewable tablet CHEW 1 TABLET EVERY DAY. CHEW AND SWALLOW 90 Tablet 1    phenazopyridine (PYRIDIUM) 200 MG Tab Take 1 Tablet by mouth 3 times a day as needed for Severe Pain. 6 Tablet 0    atorvastatin (LIPITOR) 40 MG Tab Take 1 Tablet by mouth every day. 90 Tablet 3    metoprolol SR (TOPROL XL) 25 MG TABLET SR 24 HR Take 1 Tablet by mouth every day. 90 Tablet 3    lisinopril (PRINIVIL) 10 MG Tab Take 1 Tablet by mouth every day. 90 Tablet 3    esomeprazole (NEXIUM) 20 MG capsule Take 1 Capsule by mouth every morning before breakfast. 90 Capsule 3     No current facility-administered medications on file prior to visit.       has a past medical history of Depression, HTN, Pain (01-24-14), Pain, Renal disorder, and S/p nephrectomy (2010).     Patient Active Problem List   Diagnosis    HTN (hypertension)    Dyslipidemia    Abdominal adhesions    Obesity (BMI 35.0-39.9 without comorbidity)    Complicated urinary tract infection    Recurrent UTI    TIA (transient ischemic attack)    Absent kidney    History of renal calculi    Status post hysterectomy    Hot flashes    GERD (gastroesophageal reflux disease)        Allergies:Patient has no known allergies.    Health Maintenance: deferred  Review of Systems -included above  Exam:  "  Pulse 62   Temp 36.6 °C (97.9 °F) (Temporal)   Resp 18   Ht 1.702 m (5' 7\")   Wt 111 kg (245 lb)   SpO2 97%   Body mass index is 38.37 kg/m².   Physical Exam  Skin:     Findings: Rash present. Rash is scaling.                Assessment/Plan:  1. Contact dermatitis of scalp  - keep scalp clean, moisturized.  - avoid irritants, harsh shampoos/soaps  - will try clobetasol topical for itching  - can take prn benadryl at nighttime   - states does not need referral with her insurance to consult with dermatology; she can call directly to schedule follow up    - clobetasol (TEMOVATE) 0.05 % external solution; Apply 1 Application topically 2 times a day as needed (scalp irritation).  Dispense: 50 mL; Refill: 1      Follow up:  Return if symptoms worsen or fail to improve.    Educated in proper administration of medication(s) ordered today including safety, possible SE, risks, benefits, rationale and alternatives to therapy.       Please note that this dictation was created using voice recognition software. I have made every reasonable attempt to correct obvious errors, but I expect that there are errors of grammar and possibly content that I did not discover before finalizing the note.      "

## 2023-11-12 DIAGNOSIS — I10 ESSENTIAL HYPERTENSION: ICD-10-CM

## 2023-11-13 RX ORDER — LISINOPRIL 10 MG/1
10 TABLET ORAL DAILY
Qty: 90 TABLET | Refills: 0 | Status: SHIPPED | OUTPATIENT
Start: 2023-11-13 | End: 2024-02-07

## 2024-01-03 NOTE — TELEPHONE ENCOUNTER
----- Message from Love Ruiz M.D. sent at 11/3/2017  8:25 AM PDT -----  Please inform patient and her follow-up urine culture did not show any more infection. We are waiting for final report of her blood work to check for autoimmune disease. We will get back with her as soon as all the results are completed.   [de-identified] : I reviewed patient's left wrist radiographs and upper extremity CT and discussed her condition and treatment options with patient and her niece.  I advised continuing nonoperative management and transitioned her to cast today. Well-padded short arm fiberglass cast placed today.  I instructed patient to keep cast clean and dry, avoid scratching or putting anything in the cast.  I provided my contact information to call should the cast get wet or patient have any issues with the cast.  Follow up in 1 week XRs wrist in cast.  Patient and her niece voiced understanding and agreement with the plan.

## 2024-02-07 DIAGNOSIS — I10 ESSENTIAL HYPERTENSION: ICD-10-CM

## 2024-02-07 RX ORDER — LISINOPRIL 10 MG/1
10 TABLET ORAL DAILY
Qty: 90 TABLET | Refills: 0 | Status: SHIPPED | OUTPATIENT
Start: 2024-02-07

## 2024-02-07 NOTE — TELEPHONE ENCOUNTER
Received request via: Pharmacy    Was the patient seen in the last year in this department? Yes    Does the patient have an active prescription (recently filled or refills available) for medication(s) requested? No    [facility]    Does the patient have longterm Plus and need 100 day supply (blood pressure, diabetes and cholesterol meds only)? Patient does not have SCP

## 2024-02-21 DIAGNOSIS — I10 ESSENTIAL HYPERTENSION: ICD-10-CM

## 2024-02-22 RX ORDER — METOPROLOL SUCCINATE 25 MG/1
25 TABLET, EXTENDED RELEASE ORAL DAILY
Qty: 60 TABLET | Refills: 0 | Status: SHIPPED | OUTPATIENT
Start: 2024-02-22 | End: 2024-03-19

## 2024-02-22 RX ORDER — ATORVASTATIN CALCIUM 40 MG/1
40 TABLET, FILM COATED ORAL DAILY
Qty: 60 TABLET | Refills: 0 | Status: SHIPPED | OUTPATIENT
Start: 2024-02-22 | End: 2024-03-19

## 2024-02-22 NOTE — TELEPHONE ENCOUNTER
Received request via: Pharmacy    Was the patient seen in the last year in this department? Yes    Does the patient have an active prescription (recently filled or refills available) for medication(s) requested? No        Does the patient have detention Plus and need 100 day supply (blood pressure, diabetes and cholesterol meds only)? Patient does not have SCP

## 2024-03-17 DIAGNOSIS — I10 ESSENTIAL HYPERTENSION: ICD-10-CM

## 2024-03-19 ENCOUNTER — HOSPITAL ENCOUNTER (OUTPATIENT)
Facility: MEDICAL CENTER | Age: 49
End: 2024-03-19
Attending: INTERNAL MEDICINE
Payer: COMMERCIAL

## 2024-03-19 ENCOUNTER — OFFICE VISIT (OUTPATIENT)
Dept: MEDICAL GROUP | Facility: PHYSICIAN GROUP | Age: 49
End: 2024-03-19
Payer: COMMERCIAL

## 2024-03-19 VITALS
OXYGEN SATURATION: 93 % | HEIGHT: 67 IN | WEIGHT: 245 LBS | DIASTOLIC BLOOD PRESSURE: 80 MMHG | HEART RATE: 83 BPM | SYSTOLIC BLOOD PRESSURE: 124 MMHG | BODY MASS INDEX: 38.45 KG/M2 | TEMPERATURE: 97.3 F

## 2024-03-19 DIAGNOSIS — R31.0 GROSS HEMATURIA: ICD-10-CM

## 2024-03-19 DIAGNOSIS — I10 PRIMARY HYPERTENSION: Chronic | ICD-10-CM

## 2024-03-19 DIAGNOSIS — G45.9 TIA (TRANSIENT ISCHEMIC ATTACK): Chronic | ICD-10-CM

## 2024-03-19 DIAGNOSIS — E78.5 DYSLIPIDEMIA: Chronic | ICD-10-CM

## 2024-03-19 DIAGNOSIS — K21.9 GASTROESOPHAGEAL REFLUX DISEASE WITHOUT ESOPHAGITIS: Chronic | ICD-10-CM

## 2024-03-19 DIAGNOSIS — E66.9 OBESITY (BMI 35.0-39.9 WITHOUT COMORBIDITY): Chronic | ICD-10-CM

## 2024-03-19 LAB
AMBIGUOUS DTTM AMBI4: NORMAL
APPEARANCE UR: CLEAR
BILIRUB UR STRIP-MCNC: NEGATIVE MG/DL
COLOR UR AUTO: YELLOW
GLUCOSE UR STRIP.AUTO-MCNC: NEGATIVE MG/DL
KETONES UR STRIP.AUTO-MCNC: NEGATIVE MG/DL
LEUKOCYTE ESTERASE UR QL STRIP.AUTO: NEGATIVE
NITRITE UR QL STRIP.AUTO: NEGATIVE
PH UR STRIP.AUTO: 7 [PH] (ref 5–8)
PROT UR QL STRIP: NEGATIVE MG/DL
RBC UR QL AUTO: NEGATIVE
SP GR UR STRIP.AUTO: >=1.03
UROBILINOGEN UR STRIP-MCNC: 0.2 MG/DL

## 2024-03-19 PROCEDURE — 87086 URINE CULTURE/COLONY COUNT: CPT

## 2024-03-19 PROCEDURE — 3079F DIAST BP 80-89 MM HG: CPT | Performed by: INTERNAL MEDICINE

## 2024-03-19 PROCEDURE — 81002 URINALYSIS NONAUTO W/O SCOPE: CPT | Performed by: INTERNAL MEDICINE

## 2024-03-19 PROCEDURE — 99214 OFFICE O/P EST MOD 30 MIN: CPT | Performed by: INTERNAL MEDICINE

## 2024-03-19 PROCEDURE — 3074F SYST BP LT 130 MM HG: CPT | Performed by: INTERNAL MEDICINE

## 2024-03-19 RX ORDER — METOPROLOL SUCCINATE 25 MG/1
25 TABLET, EXTENDED RELEASE ORAL DAILY
Qty: 60 TABLET | Refills: 0 | Status: SHIPPED | OUTPATIENT
Start: 2024-03-19

## 2024-03-19 RX ORDER — ATORVASTATIN CALCIUM 40 MG/1
40 TABLET, FILM COATED ORAL DAILY
Qty: 60 TABLET | Refills: 0 | Status: SHIPPED | OUTPATIENT
Start: 2024-03-19 | End: 2024-03-24

## 2024-03-19 ASSESSMENT — FIBROSIS 4 INDEX: FIB4 SCORE: 0.71

## 2024-03-19 ASSESSMENT — PATIENT HEALTH QUESTIONNAIRE - PHQ9: CLINICAL INTERPRETATION OF PHQ2 SCORE: 0

## 2024-03-19 NOTE — ASSESSMENT & PLAN NOTE
Chronic condition diagnosed in 2020.  The patient presently taking aspirin 81 mg daily.  No new neurologic symptoms reported.

## 2024-03-19 NOTE — ASSESSMENT & PLAN NOTE
Chronic condition.  Patient is taking Nexium 20 Mg daily.  Patient denies nausea vomiting dysphagia or unexplained weight loss.

## 2024-03-19 NOTE — ASSESSMENT & PLAN NOTE
Chronic condition.  The patient is taking lisinopril 10 mg daily.  Blood pressure has been well-controlled patient tolerating medication well.

## 2024-03-19 NOTE — TELEPHONE ENCOUNTER
Received request via: Pharmacy    Was the patient seen in the last year in this department? Yes    Does the patient have an active prescription (recently filled or refills available) for medication(s) requested? Yes. Refill request has been refused in Epic. Contacted pharmacy and called in most recent prescription.      Does the patient have nursing home Plus and need 100 day supply (blood pressure, diabetes and cholesterol meds only)? Patient does not have SCP

## 2024-03-19 NOTE — PROGRESS NOTES
PRIMARY CARE CLINIC VISIT        Chief Complaint   Patient presents with    Follow-Up     Blood in urine.      Blood in urine  Follow-up hypertension  Acid reflux  Hyperlipidemia  Obesity  TIA        History of Present Illness     Gross hematuria  This is a new condition.  The patient reported blood with urination.  The patient also noted frequent urination.  Patient reported history of kidney stone previously.  Patient denies fever.  She reported intermittent chills.  She denied vaginal discharge or vaginal bleeding.    TIA (transient ischemic attack)  Chronic condition diagnosed in 2020.  The patient presently taking aspirin 81 mg daily.  No new neurologic symptoms reported.    Dyslipidemia  Chronic condition.  Patient is taking Lipitor 40 Mg daily.  Patient tolerating medication well.    GERD (gastroesophageal reflux disease)  Chronic condition.  Patient is taking Nexium 20 Mg daily.  Patient denies nausea vomiting dysphagia or unexplained weight loss.      HTN (hypertension)  Chronic condition.  The patient is taking lisinopril 10 mg daily.  Blood pressure has been well-controlled patient tolerating medication well.    Obesity (BMI 35.0-39.9 without comorbidity)  Chronic condition.  Discussed with the patient regarding diet, exercise, and lifestyle modification to help achieve and maintain healthy weight         Current Outpatient Medications on File Prior to Visit   Medication Sig Dispense Refill    metoprolol SR (TOPROL XL) 25 MG TABLET SR 24 HR TAKE 1 TABLET BY MOUTH EVERY DAY 60 Tablet 0    atorvastatin (LIPITOR) 40 MG Tab TAKE 1 TABLET BY MOUTH EVERY DAY 60 Tablet 0    lisinopril (PRINIVIL) 10 MG Tab TAKE 1 TABLET BY MOUTH EVERY DAY 90 Tablet 0    aspirin (ASPIRIN LOW DOSE) 81 MG Chew Tab chewable tablet CHEW 1 TABLET EVERY DAY. CHEW AND SWALLOW 90 Tablet 1    phenazopyridine (PYRIDIUM) 200 MG Tab Take 1 Tablet by mouth 3 times a day as needed for Severe Pain. 6 Tablet 0    esomeprazole (NEXIUM) 20 MG  capsule Take 1 Capsule by mouth every morning before breakfast. 90 Capsule 3     No current facility-administered medications on file prior to visit.        Allergies: Patient has no known allergies.    Current Outpatient Medications Ordered in Epic   Medication Sig Dispense Refill    metoprolol SR (TOPROL XL) 25 MG TABLET SR 24 HR TAKE 1 TABLET BY MOUTH EVERY DAY 60 Tablet 0    atorvastatin (LIPITOR) 40 MG Tab TAKE 1 TABLET BY MOUTH EVERY DAY 60 Tablet 0    lisinopril (PRINIVIL) 10 MG Tab TAKE 1 TABLET BY MOUTH EVERY DAY 90 Tablet 0    aspirin (ASPIRIN LOW DOSE) 81 MG Chew Tab chewable tablet CHEW 1 TABLET EVERY DAY. CHEW AND SWALLOW 90 Tablet 1    phenazopyridine (PYRIDIUM) 200 MG Tab Take 1 Tablet by mouth 3 times a day as needed for Severe Pain. 6 Tablet 0    esomeprazole (NEXIUM) 20 MG capsule Take 1 Capsule by mouth every morning before breakfast. 90 Capsule 3     No current [facility]-ordered facility-administered medications on file.       Past Medical History:   Diagnosis Date    Depression     HTN     resolved 8/10    Pain 01-24-14    abd., 0/10    Pain     right hip    Renal disorder     right nephrectomy 2010    S/p nephrectomy 2010    right removed       Past Surgical History:   Procedure Laterality Date    HIP ARTHROSCOPY  12/18/2014    Performed by Benji Lott M.D. at SURGERY [facility]    FEMORAL NECK OSTEOPLASTY  12/18/2014    Performed by Benji Lott M.D. at SURGERY [facility]    ACETABULAR OSTEOPLASTY  12/18/2014    Performed by Benji Lott M.D. at [facility]    SYNOVECTOMY  12/18/2014    Performed by Benji Lott M.D. at [facility]    LAPAROSCOPY  1/27/2014    Performed by Ihsan Dior M.D. at SURGERY [facility]    LYSIS ADHESIONS GENERAL  1/27/2014    Performed by Ihsan Dior M.D. at SURGERY [facility]    CASSANDRA BY LAPAROSCOPY  12/11/2013    Performed by Ihsan Dior M.D. at SURGERY [facility]  "   CHOLECYSTECTOMY  2013    NEPHRECTOMY LAPAROSCOPIC  2010    Right- Performed by NINA CORREA at SURGERY [facility]    VT NEPHRECTOMY  2010    CYSTOSCOPY STENT PLACEMENT  8/11/2009    Performed by KIMBERLY PALACIOS at SURGERY [facility]    URETEROSCOPY  8/11/2009    Performed by KIMBERLY PALACIOS at SURGERY [facility]    STONE RETRIEVAL  8/11/2009    Performed by KIMBERLY PALACIOS at SURGERY [facility]    STENT REMOVAL  8/11/2009    Performed by KIMBERLY PALACIOS at SURGERY [facility]    LAPAROTOMY  2009    bladder and ureter reconstruction    URETERAL REIMPLANTATION  4/23/08    Performed by KIMBERLY PALACIOS at SURGERY [facility]    STENT PLACEMENT  4/23/08    Performed by KIMBERLY PALACIOS at SURGERY [facility]    LYSIS ADHESIONS GENERAL  5/06    ABDOMINAL HYSTERECTOMY TOTAL  2005    Hysterectomy, Total Abdominal    LAPAROTOMY  2005    TONSILLECTOMY  2004    APPENDECTOMY  1991    IR-URETERAL STENT ANTEGRADE      R side    OTHER  [id number]    \"multiple procedures prior to nephrectom , stent placement/ nephrostomy tube     URETEROSCOPY      partial removal of ureter with reimplantation       Family History   Problem Relation Age of Onset    Hypertension Mother     Hypertension Father     Hypertension Brother     Genitourinary () Problems Brother         kidney stones       Social History     Tobacco Use   Smoking Status Never   Smokeless Tobacco Never       Social History     Substance and Sexual Activity   Alcohol Use Not Currently    Alcohol/week: 0.0 oz    Comment: 2 per month       Review of systems  As per HPI above. All other systems reviewed and negative.      Past Medical, Social, and Family history reviewed and updated in [facility]       LAB DATA:    Lab Results   Component Value Date/Time    HBA1C 5.2 02/15/2023 02:43 PM    HBA1C 5.1 08/29/2022 11:07 AM    HBA1C 5.4 04/28/2022 10:18 AM    HBA1C 5.1 10/29/2020 02:00 PM       Lab Results   Component Value Date/Time " "   WBC 7.1 04/28/2022 10:18 AM    WBC 7.1 07/22/2021 11:44 AM    HEMOGLOBIN 14.1 04/28/2022 10:18 AM    HEMOGLOBIN 14.0 07/22/2021 11:44 AM    HEMATOCRIT 41.4 04/28/2022 10:18 AM    HEMATOCRIT 42.1 07/22/2021 11:44 AM    MCV 89 04/28/2022 10:18 AM    MCV 90.3 07/22/2021 11:44 AM    PLATELETCT 280 04/28/2022 10:18 AM    PLATELETCT 247 07/22/2021 11:44 AM       Lab Results   Component Value Date/Time    SODIUM 142 02/15/2023 02:43 PM    POTASSIUM 4.5 02/15/2023 02:43 PM    GLUCOSE 89 02/15/2023 02:43 PM    BUN 14 02/15/2023 02:43 PM    CREATININE 0.94 02/15/2023 02:43 PM    CREATININE 0.8 04/17/2009 03:38 PM       Lab Results   Component Value Date/Time    CHOLSTRLTOT 168 02/15/2023 02:43 PM    TRIGLYCERIDE 109 02/15/2023 02:43 PM    HDL 43 02/15/2023 02:43 PM     (H) 02/15/2023 02:43 PM       Lab Results   Component Value Date/Time    ALTSGPT 15 02/15/2023 02:43 PM          Objective     /80 (BP Location: Right arm, Patient Position: Sitting, BP Cuff Size: Adult)   Pulse 83   Temp 36.3 °C (97.3 °F) (Temporal)   Ht 1.702 m (5' 7\")   Wt 111 kg (245 lb)   SpO2 93%    Body mass index is 38.37 kg/m².    General: alert in no apparent distress.  Cardiovascular: regular rate and rhythm  Pulmonary: lungs : no wheezing   Gastrointestinal: BS present.       Dipstick in the office today negative glucose negative blood negative protein negative nitrite negative leukocyte Estrace    Assessment and Plan     1. Gross hematuria  This is a new condition.  Cause unclear.  Rule out stone versus neoplasm versus others the following are recommended.  - CBC WITHOUT DIFFERENTIAL; Future  - Basic Metabolic Panel; Future  - URINE CULTURE(NEW); Future  - POCT Urinalysis  - CT-ABDOMEN & PELVIS UROGRAM; Future  - Referral to Urology  The patient to go ER if symptoms recur.    2. Primary hypertension  Chronic stable.  Continue lisinopril 10 mg daily and metoprolol 25 mg daily.    3. Gastroesophageal reflux disease without " esophagitis  Chronic stable.  Continue Nexium 20 mg daily    4. Dyslipidemia  - Lipid Profile; Future  - ALANINE AMINO-TRANS; Future  Chronic condition.  Current status unclear.  Lab test ordered to check lipid panel.  Continue Lipitor 40 Mg daily    5. Obesity (BMI 35.0-39.9 without comorbidity)  Chronic condition.  Uncontrolled.  Recommend diet and lifestyle modification.  Encouraged patient to maintain ideal weight.    6. TIA (transient ischemic attack)  Chronic stable condition.  Continue aspirin 81 mg daily.          Attestation: I spent:   32 min -  That includes time for chart review before the visit, the actual patient visit, and time spent on documentation in EMR after the visit.  Chart review/prep, review of other providers' records, imaging/lab review, face-to-face time for history/examination, pt's counseling/education, ordering, prescribing,  review of results/meds/ treatment plan with patient, and care coordination.           Healthcare Maintenance       Health Maintenance Due   Topic Date Due    HIV Screening  Never done    Hepatitis B Vaccine (Hep B) (1 of 3 - 19+ 3-dose series) Never done    IMM DTaP/Tdap/Td Vaccine (1 - Tdap) Never done               Please note that this dictation was created using voice recognition software. I have made every reasonable attempt to correct obvious errors, but I expect that there are errors of grammar and possibly content that I did not discover before finalizing the note.    Flavio Junior MD  Internal Medicine  [facility]

## 2024-03-19 NOTE — ASSESSMENT & PLAN NOTE
This is a new condition.  The patient reported blood with urination.  The patient also noted frequent urination.  Patient reported history of kidney stone previously.  Patient denies fever.  She reported intermittent chills.  She denied vaginal discharge or vaginal bleeding.

## 2024-03-21 LAB
BACTERIA UR CULT: NORMAL
SIGNIFICANT IND 70042: NORMAL
SITE SITE: NORMAL
SOURCE SOURCE: NORMAL

## 2024-03-23 ENCOUNTER — HOSPITAL ENCOUNTER (OUTPATIENT)
Dept: LAB | Facility: MEDICAL CENTER | Age: 49
End: 2024-03-23
Attending: INTERNAL MEDICINE
Payer: COMMERCIAL

## 2024-03-23 DIAGNOSIS — R31.0 GROSS HEMATURIA: ICD-10-CM

## 2024-03-23 DIAGNOSIS — E78.5 DYSLIPIDEMIA: Chronic | ICD-10-CM

## 2024-03-23 LAB
ALT SERPL-CCNC: 28 U/L (ref 2–50)
ANION GAP SERPL CALC-SCNC: 14 MMOL/L (ref 7–16)
BUN SERPL-MCNC: 14 MG/DL (ref 8–22)
CALCIUM SERPL-MCNC: 9.3 MG/DL (ref 8.5–10.5)
CHLORIDE SERPL-SCNC: 108 MMOL/L (ref 96–112)
CHOLEST SERPL-MCNC: 175 MG/DL (ref 100–199)
CO2 SERPL-SCNC: 22 MMOL/L (ref 20–33)
CREAT SERPL-MCNC: 0.77 MG/DL (ref 0.5–1.4)
ERYTHROCYTE [DISTWIDTH] IN BLOOD BY AUTOMATED COUNT: 41.7 FL (ref 35.9–50)
FASTING STATUS PATIENT QL REPORTED: NORMAL
GFR SERPLBLD CREATININE-BSD FMLA CKD-EPI: 95 ML/MIN/1.73 M 2
GLUCOSE SERPL-MCNC: 106 MG/DL (ref 65–99)
HCT VFR BLD AUTO: 43.6 % (ref 37–47)
HDLC SERPL-MCNC: 47 MG/DL
HGB BLD-MCNC: 14.5 G/DL (ref 12–16)
LDLC SERPL CALC-MCNC: 104 MG/DL
MCH RBC QN AUTO: 30.3 PG (ref 27–33)
MCHC RBC AUTO-ENTMCNC: 33.3 G/DL (ref 32.2–35.5)
MCV RBC AUTO: 91 FL (ref 81.4–97.8)
PLATELET # BLD AUTO: 264 K/UL (ref 164–446)
PMV BLD AUTO: 11 FL (ref 9–12.9)
POTASSIUM SERPL-SCNC: 4.4 MMOL/L (ref 3.6–5.5)
RBC # BLD AUTO: 4.79 M/UL (ref 4.2–5.4)
SODIUM SERPL-SCNC: 144 MMOL/L (ref 135–145)
TRIGL SERPL-MCNC: 121 MG/DL (ref 0–149)
WBC # BLD AUTO: 6.7 K/UL (ref 4.8–10.8)

## 2024-03-23 PROCEDURE — 84460 ALANINE AMINO (ALT) (SGPT): CPT

## 2024-03-23 PROCEDURE — 80048 BASIC METABOLIC PNL TOTAL CA: CPT

## 2024-03-23 PROCEDURE — 80061 LIPID PANEL: CPT

## 2024-03-23 PROCEDURE — 36415 COLL VENOUS BLD VENIPUNCTURE: CPT

## 2024-03-23 PROCEDURE — 85027 COMPLETE CBC AUTOMATED: CPT

## 2024-03-24 DIAGNOSIS — E78.5 DYSLIPIDEMIA: Chronic | ICD-10-CM

## 2024-03-24 DIAGNOSIS — R73.03 PREDIABETES: Chronic | ICD-10-CM

## 2024-03-24 RX ORDER — ATORVASTATIN CALCIUM 80 MG/1
80 TABLET, FILM COATED ORAL NIGHTLY
Qty: 90 TABLET | Refills: 3 | Status: SHIPPED | OUTPATIENT
Start: 2024-03-24

## 2024-03-27 ENCOUNTER — HOSPITAL ENCOUNTER (OUTPATIENT)
Dept: RADIOLOGY | Facility: MEDICAL CENTER | Age: 49
End: 2024-03-27
Attending: INTERNAL MEDICINE
Payer: COMMERCIAL

## 2024-03-27 DIAGNOSIS — R31.0 GROSS HEMATURIA: ICD-10-CM

## 2024-03-27 PROCEDURE — 700117 HCHG RX CONTRAST REV CODE 255: Performed by: INTERNAL MEDICINE

## 2024-03-27 PROCEDURE — 74178 CT ABD&PLV WO CNTR FLWD CNTR: CPT

## 2024-03-27 RX ADMIN — IOHEXOL 100 ML: 350 INJECTION, SOLUTION INTRAVENOUS at 11:30

## 2024-04-02 RX ORDER — ASPIRIN 81 MG/1
TABLET, CHEWABLE ORAL
Qty: 90 TABLET | Refills: 0 | Status: SHIPPED | OUTPATIENT
Start: 2024-04-02

## 2024-04-02 NOTE — TELEPHONE ENCOUNTER
Received request via: Pharmacy    Was the patient seen in the last year in this department? Yes    Does the patient have an active prescription (recently filled or refills available) for medication(s) requested? No    [facility]    Does the patient have residential Plus and need 100 day supply (blood pressure, diabetes and cholesterol meds only)? Patient does not have SCP

## 2024-04-22 DIAGNOSIS — I10 ESSENTIAL HYPERTENSION: ICD-10-CM

## 2024-04-23 RX ORDER — METOPROLOL SUCCINATE 25 MG/1
25 TABLET, EXTENDED RELEASE ORAL DAILY
Qty: 90 TABLET | Refills: 1 | Status: SHIPPED | OUTPATIENT
Start: 2024-04-23

## 2024-04-23 NOTE — TELEPHONE ENCOUNTER
Received request via: Pharmacy    Was the patient seen in the last year in this department? Yes    Does the patient have an active prescription (recently filled or refills available) for medication(s) requested? No      Does the patient have prison Plus and need 100 day supply (blood pressure, diabetes and cholesterol meds only)? Patient does not have SCP    90 day supply request

## 2024-05-10 DIAGNOSIS — I10 ESSENTIAL HYPERTENSION: ICD-10-CM

## 2024-05-10 RX ORDER — LISINOPRIL 10 MG/1
10 TABLET ORAL DAILY
Qty: 90 TABLET | Refills: 3 | Status: SHIPPED | OUTPATIENT
Start: 2024-05-10

## 2024-05-10 NOTE — TELEPHONE ENCOUNTER
Received request via: Pharmacy    Was the patient seen in the last year in this department? Yes    Does the patient have an active prescription (recently filled or refills available) for medication(s) requested? No    [facility]     Does the patient have senior living Plus and need 100 day supply (blood pressure, diabetes and cholesterol meds only)? Patient does not have SCP

## 2024-05-13 ENCOUNTER — OFFICE VISIT (OUTPATIENT)
Dept: URGENT CARE | Facility: PHYSICIAN GROUP | Age: 49
End: 2024-05-13
Payer: COMMERCIAL

## 2024-05-13 VITALS
TEMPERATURE: 97.9 F | WEIGHT: 244.5 LBS | HEIGHT: 67 IN | RESPIRATION RATE: 16 BRPM | BODY MASS INDEX: 38.37 KG/M2 | SYSTOLIC BLOOD PRESSURE: 116 MMHG | OXYGEN SATURATION: 97 % | HEART RATE: 85 BPM | DIASTOLIC BLOOD PRESSURE: 56 MMHG

## 2024-05-13 DIAGNOSIS — H10.33 ACUTE BACTERIAL CONJUNCTIVITIS OF BOTH EYES: ICD-10-CM

## 2024-05-13 PROCEDURE — 3078F DIAST BP <80 MM HG: CPT | Performed by: NURSE PRACTITIONER

## 2024-05-13 PROCEDURE — 99213 OFFICE O/P EST LOW 20 MIN: CPT | Performed by: NURSE PRACTITIONER

## 2024-05-13 PROCEDURE — 3074F SYST BP LT 130 MM HG: CPT | Performed by: NURSE PRACTITIONER

## 2024-05-13 RX ORDER — POLYMYXIN B SULFATE AND TRIMETHOPRIM 1; 10000 MG/ML; [USP'U]/ML
1 SOLUTION OPHTHALMIC EVERY 4 HOURS
Qty: 4 ML | Refills: 0 | Status: SHIPPED | OUTPATIENT
Start: 2024-05-13 | End: 2024-05-23

## 2024-05-13 ASSESSMENT — FIBROSIS 4 INDEX: FIB4 SCORE: 0.56

## 2024-05-14 ASSESSMENT — ENCOUNTER SYMPTOMS
PHOTOPHOBIA: 0
DOUBLE VISION: 0
GASTROINTESTINAL NEGATIVE: 1
MUSCULOSKELETAL NEGATIVE: 1
CARDIOVASCULAR NEGATIVE: 1
RESPIRATORY NEGATIVE: 1
NEUROLOGICAL NEGATIVE: 1
FOREIGN BODY SENSATION: 0
EYE DISCHARGE: 1
EYE PAIN: 1
BLURRED VISION: 0
CONSTITUTIONAL NEGATIVE: 1
EYE REDNESS: 1
PSYCHIATRIC NEGATIVE: 1

## 2024-05-14 NOTE — PROGRESS NOTES
"Subjective:   Leslie Nazario is a 49 y.o. female who presents for Eye Problem (X 1 day Red, irritated eyes)      Eye Problem   Both (Left greater than right) eyes are affected. The current episode started yesterday. The problem occurs constantly. The problem has been gradually worsening. There was no injury mechanism (Exposure to child with pink eye). The pain is at a severity of 4/10. The pain is moderate. There is Known exposure to pink eye. Associated symptoms include an eye discharge and eye redness. Pertinent negatives include no blurred vision, double vision, foreign body sensation, photophobia or recent URI. Treatments tried: warm compresses. The treatment provided mild relief.       Review of Systems   Constitutional: Negative.    HENT: Negative.     Eyes:  Positive for pain, discharge and redness. Negative for blurred vision, double vision and photophobia.   Respiratory: Negative.     Cardiovascular: Negative.    Gastrointestinal: Negative.    Genitourinary: Negative.    Musculoskeletal: Negative.    Skin: Negative.    Neurological: Negative.    Endo/Heme/Allergies: Negative.    Psychiatric/Behavioral: Negative.     All other systems reviewed and are negative.      Medications, Allergies, and current problem list reviewed today in Epic.     Objective:     /56   Pulse 85   Temp 36.6 °C (97.9 °F) (Temporal)   Resp 16   Ht 1.702 m (5' 7\")   Wt 111 kg (244 lb 8 oz)   SpO2 97%     Physical Exam  Vitals reviewed.   Constitutional:       General: She is not in acute distress.     Appearance: Normal appearance. She is not ill-appearing.   HENT:      Head: Normocephalic and atraumatic.      Right Ear: Tympanic membrane, ear canal and external ear normal.      Left Ear: Tympanic membrane, ear canal and external ear normal.      Nose: Nose normal.      Mouth/Throat:      Mouth: Mucous membranes are moist.      Pharynx: Oropharynx is clear.   Eyes:      General: Lids are normal. Lids are everted, no " foreign bodies appreciated.         Right eye: Discharge present. No foreign body or hordeolum.         Left eye: Discharge present.No foreign body or hordeolum.      Extraocular Movements: Extraocular movements intact.      Conjunctiva/sclera:      Right eye: Right conjunctiva is injected.      Left eye: Left conjunctiva is injected.      Pupils: Pupils are equal, round, and reactive to light.   Cardiovascular:      Rate and Rhythm: Normal rate and regular rhythm.   Pulmonary:      Effort: Pulmonary effort is normal.      Breath sounds: Normal breath sounds.   Abdominal:      General: Abdomen is flat.      Palpations: Abdomen is soft.   Musculoskeletal:         General: Normal range of motion.      Cervical back: Normal range of motion and neck supple.   Skin:     General: Skin is warm and dry.      Capillary Refill: Capillary refill takes less than 2 seconds.   Neurological:      General: No focal deficit present.      Mental Status: She is alert.   Psychiatric:         Mood and Affect: Mood normal.         Behavior: Behavior normal.         Assessment/Plan:     Diagnosis and associated orders:     1. Acute bacterial conjunctivitis of both eyes  polymixin-trimethoprim (POLYTRIM) 70569-5.1 UNIT/ML-% Solution         Comments/MDM:     Warm compresses to affected eye(s) 3 times daily  Hand hygiene discussed  Wash all recently used linens and towels in hot water  Do not touch dropper to eye (s)           Differential diagnosis, natural history, supportive care, and indications for immediate follow-up discussed.    Advised the patient to follow-up with the primary care physician for recheck, reevaluation, and consideration of further management.    Please note that this dictation was created using voice recognition software. I have made a reasonable attempt to correct obvious errors, but I expect that there are errors of grammar and possibly content that I did not discover before finalizing the note.    This note was  electronically signed by QUANG Esparza

## 2024-06-28 ENCOUNTER — HOSPITAL ENCOUNTER (OUTPATIENT)
Dept: LAB | Facility: MEDICAL CENTER | Age: 49
End: 2024-06-28
Payer: COMMERCIAL

## 2024-06-28 LAB
EST. AVERAGE GLUCOSE BLD GHB EST-MCNC: 105 MG/DL
HBA1C MFR BLD: 5.3 % (ref 4–5.6)
TSH SERPL DL<=0.005 MIU/L-ACNC: 1.8 UIU/ML (ref 0.38–5.33)

## 2024-06-28 PROCEDURE — 84443 ASSAY THYROID STIM HORMONE: CPT

## 2024-06-28 PROCEDURE — 83036 HEMOGLOBIN GLYCOSYLATED A1C: CPT

## 2024-06-28 PROCEDURE — 36415 COLL VENOUS BLD VENIPUNCTURE: CPT

## 2024-06-28 PROCEDURE — 83525 ASSAY OF INSULIN: CPT

## 2024-06-30 LAB — INSULIN P FAST SERPL-ACNC: 30 UIU/ML (ref 3–25)

## 2024-07-15 RX ORDER — ASPIRIN 81 MG/1
TABLET, CHEWABLE ORAL
Qty: 90 TABLET | Refills: 0 | Status: SHIPPED | OUTPATIENT
Start: 2024-07-15

## 2024-10-17 RX ORDER — ASPIRIN 81 MG/1
TABLET, CHEWABLE ORAL
Qty: 90 TABLET | Refills: 0 | Status: SHIPPED | OUTPATIENT
Start: 2024-10-17

## 2024-10-25 DIAGNOSIS — I10 ESSENTIAL HYPERTENSION: ICD-10-CM

## 2024-10-28 RX ORDER — METOPROLOL SUCCINATE 25 MG/1
25 TABLET, EXTENDED RELEASE ORAL DAILY
Qty: 90 TABLET | Refills: 1 | Status: SHIPPED | OUTPATIENT
Start: 2024-10-28

## 2025-02-23 DIAGNOSIS — I10 ESSENTIAL HYPERTENSION: ICD-10-CM

## 2025-02-24 RX ORDER — METOPROLOL SUCCINATE 25 MG/1
25 TABLET, EXTENDED RELEASE ORAL DAILY
Qty: 90 TABLET | Refills: 1 | Status: SHIPPED | OUTPATIENT
Start: 2025-02-24

## 2025-02-24 NOTE — TELEPHONE ENCOUNTER
Received request via: Pharmacy    Was the patient seen in the last year in this department? Yes    Does the patient have an active prescription (recently filled or refills available) for medication(s) requested? No      Does the patient have group home Plus and need 100-day supply? (This applies to ALL medications) Patient does not have SCP

## 2025-03-31 ENCOUNTER — OFFICE VISIT (OUTPATIENT)
Dept: MEDICAL GROUP | Facility: PHYSICIAN GROUP | Age: 50
End: 2025-03-31
Payer: COMMERCIAL

## 2025-03-31 ENCOUNTER — HOSPITAL ENCOUNTER (OUTPATIENT)
Facility: MEDICAL CENTER | Age: 50
DRG: 690 | End: 2025-03-31
Attending: PHYSICIAN ASSISTANT
Payer: COMMERCIAL

## 2025-03-31 VITALS
RESPIRATION RATE: 18 BRPM | WEIGHT: 216 LBS | OXYGEN SATURATION: 97 % | HEART RATE: 88 BPM | HEIGHT: 67 IN | TEMPERATURE: 98.2 F | BODY MASS INDEX: 33.9 KG/M2 | SYSTOLIC BLOOD PRESSURE: 138 MMHG | DIASTOLIC BLOOD PRESSURE: 90 MMHG

## 2025-03-31 DIAGNOSIS — N39.0 COMPLICATED URINARY TRACT INFECTION: ICD-10-CM

## 2025-03-31 DIAGNOSIS — R30.0 DYSURIA: ICD-10-CM

## 2025-03-31 DIAGNOSIS — Z90.5 ABSENT KIDNEY: ICD-10-CM

## 2025-03-31 LAB
APPEARANCE UR: NORMAL
BILIRUB UR STRIP-MCNC: NEGATIVE MG/DL
COLOR UR AUTO: NORMAL
GLUCOSE UR STRIP.AUTO-MCNC: NEGATIVE MG/DL
KETONES UR STRIP.AUTO-MCNC: NEGATIVE MG/DL
LEUKOCYTE ESTERASE UR QL STRIP.AUTO: NEGATIVE
NITRITE UR QL STRIP.AUTO: NEGATIVE
PH UR STRIP.AUTO: 6 [PH] (ref 5–8)
PROT UR QL STRIP: NEGATIVE MG/DL
RBC UR QL AUTO: NEGATIVE
SP GR UR STRIP.AUTO: 1.02
UROBILINOGEN UR STRIP-MCNC: NORMAL MG/DL

## 2025-03-31 PROCEDURE — 87086 URINE CULTURE/COLONY COUNT: CPT

## 2025-03-31 PROCEDURE — 87077 CULTURE AEROBIC IDENTIFY: CPT

## 2025-03-31 RX ORDER — SULFAMETHOXAZOLE AND TRIMETHOPRIM 800; 160 MG/1; MG/1
1 TABLET ORAL 2 TIMES DAILY
Qty: 6 TABLET | Refills: 0 | Status: SHIPPED | OUTPATIENT
Start: 2025-03-31 | End: 2025-04-03

## 2025-03-31 RX ORDER — PHENAZOPYRIDINE HYDROCHLORIDE 200 MG/1
200 TABLET, FILM COATED ORAL 3 TIMES DAILY PRN
Qty: 30 TABLET | Refills: 0 | Status: SHIPPED | OUTPATIENT
Start: 2025-03-31

## 2025-03-31 RX ORDER — CEFDINIR 300 MG/1
300 CAPSULE ORAL 2 TIMES DAILY
Qty: 14 CAPSULE | Refills: 0 | Status: SHIPPED | OUTPATIENT
Start: 2025-03-31 | End: 2025-03-31

## 2025-03-31 RX ORDER — PHENTERMINE HYDROCHLORIDE 37.5 MG/1
18.75 TABLET ORAL 2 TIMES DAILY
COMMUNITY
Start: 2025-03-03

## 2025-03-31 RX ORDER — METFORMIN HYDROCHLORIDE 500 MG/1
TABLET, EXTENDED RELEASE ORAL
COMMUNITY
Start: 2025-02-19

## 2025-03-31 ASSESSMENT — PATIENT HEALTH QUESTIONNAIRE - PHQ9: CLINICAL INTERPRETATION OF PHQ2 SCORE: 0

## 2025-03-31 NOTE — PROGRESS NOTES
"Chief Complaint   Patient presents with    Painful Urination     X 3 days     Urinary Frequency       HPI:  Symptom onset: 3 days ago   Current symptoms: Painful, urgent, frequent voids. No blood noted in urine.  Since onset symptoms are: Unchanged  Treatments tried: OTC antispasm med, tylenol  Associated symptoms: Negative for fever, flank pain, nausea and vomiting, vaginal discharge, pelvic pain.  History is positive for frequent UTI.     ROS:  Denies fever, chills, vomiting or abdominal pain.     OBJECTIVE:  BP (!) 138/90   Pulse 88   Temp 36.8 °C (98.2 °F) (Temporal)   Resp 18   Ht 1.702 m (5' 7\")   Wt 98 kg (216 lb)   SpO2 97%   Gen: Alert, NAD.  Abdomen: Soft, tender in suprapubic region. No CVAT.      Lab Results   Component Value Date    POCCOLOR other 03/31/2025    POCAPPEAR cloudy 03/31/2025    POCLEUKEST negative 03/31/2025    POCNITRITE negative 03/31/2025    POCUROBILIGE 0.2 E.U./dL 03/31/2025    POCPROTEIN negative 03/31/2025    POCURPH 6.0 03/31/2025    POCBLOOD negative 03/31/2025    POCSPGRV 1.025 03/31/2025    POCKETONES negative 03/31/2025    POCBILIRUBIN negative 03/31/2025    POCGLUCUA negative 03/31/2025          ASSESSMENT/PLAN:     1. Dysuria    2. Absent kidney    3. Complicated urinary tract infection          1.  Urine dipstick in the office is overall unremarkable however patient has a complex medical history including recurrent UTIs as well as an absent kidney from recurrent kidney stones. Urine sent for culture. Start antibiotics.  Rocephin given in the office today given high risk for progression to sepsis as patient has had urosepsis in the past   2. Provided education to drink plenty of fluids, wipe front to back every void and bowel movement.   3. Return to clinic if symptoms not improving within 3-4 days or in case of vomiting, fever, increasing pain.    "

## 2025-04-01 ENCOUNTER — HOSPITAL ENCOUNTER (INPATIENT)
Facility: MEDICAL CENTER | Age: 50
LOS: 2 days | DRG: 690 | End: 2025-04-03
Attending: EMERGENCY MEDICINE
Payer: COMMERCIAL

## 2025-04-01 ENCOUNTER — APPOINTMENT (OUTPATIENT)
Dept: RADIOLOGY | Facility: MEDICAL CENTER | Age: 50
DRG: 690 | End: 2025-04-01
Attending: EMERGENCY MEDICINE
Payer: COMMERCIAL

## 2025-04-01 DIAGNOSIS — N12 PYELONEPHRITIS: ICD-10-CM

## 2025-04-01 DIAGNOSIS — R10.9 FLANK PAIN: ICD-10-CM

## 2025-04-01 DIAGNOSIS — Z90.5 STATUS POST NEPHRECTOMY: ICD-10-CM

## 2025-04-01 DIAGNOSIS — R30.0 DYSURIA: ICD-10-CM

## 2025-04-01 PROBLEM — E11.9 TYPE 2 DIABETES MELLITUS (HCC): Status: ACTIVE | Noted: 2025-04-01

## 2025-04-01 LAB
ALBUMIN SERPL BCP-MCNC: 4.2 G/DL (ref 3.2–4.9)
ALBUMIN/GLOB SERPL: 1.4 G/DL
ALP SERPL-CCNC: 85 U/L (ref 30–99)
ALT SERPL-CCNC: 20 U/L (ref 2–50)
ANION GAP SERPL CALC-SCNC: 13 MMOL/L (ref 7–16)
APPEARANCE UR: CLEAR
AST SERPL-CCNC: 21 U/L (ref 12–45)
BACTERIA #/AREA URNS HPF: ABNORMAL /HPF
BASOPHILS # BLD AUTO: 0.6 % (ref 0–1.8)
BASOPHILS # BLD: 0.04 K/UL (ref 0–0.12)
BILIRUB SERPL-MCNC: 0.5 MG/DL (ref 0.1–1.5)
BILIRUB UR QL STRIP.AUTO: NEGATIVE
BUN SERPL-MCNC: 10 MG/DL (ref 8–22)
CALCIUM ALBUM COR SERPL-MCNC: 9.6 MG/DL (ref 8.5–10.5)
CALCIUM SERPL-MCNC: 9.8 MG/DL (ref 8.5–10.5)
CASTS URNS QL MICRO: ABNORMAL /LPF (ref 0–2)
CHLORIDE SERPL-SCNC: 104 MMOL/L (ref 96–112)
CO2 SERPL-SCNC: 24 MMOL/L (ref 20–33)
COLOR UR: ABNORMAL
CREAT SERPL-MCNC: 0.81 MG/DL (ref 0.5–1.4)
EOSINOPHIL # BLD AUTO: 0.06 K/UL (ref 0–0.51)
EOSINOPHIL NFR BLD: 0.9 % (ref 0–6.9)
EPITHELIAL CELLS 1715: ABNORMAL /HPF (ref 0–5)
ERYTHROCYTE [DISTWIDTH] IN BLOOD BY AUTOMATED COUNT: 42.6 FL (ref 35.9–50)
GFR SERPLBLD CREATININE-BSD FMLA CKD-EPI: 88 ML/MIN/1.73 M 2
GLOBULIN SER CALC-MCNC: 3.1 G/DL (ref 1.9–3.5)
GLUCOSE SERPL-MCNC: 88 MG/DL (ref 65–99)
GLUCOSE UR STRIP.AUTO-MCNC: NEGATIVE MG/DL
HCT VFR BLD AUTO: 42.2 % (ref 37–47)
HGB BLD-MCNC: 14.1 G/DL (ref 12–16)
IMM GRANULOCYTES # BLD AUTO: 0.03 K/UL (ref 0–0.11)
IMM GRANULOCYTES NFR BLD AUTO: 0.4 % (ref 0–0.9)
KETONES UR STRIP.AUTO-MCNC: NEGATIVE MG/DL
LACTATE SERPL-SCNC: 1.8 MMOL/L (ref 0.5–2)
LEUKOCYTE ESTERASE UR QL STRIP.AUTO: NEGATIVE
LYMPHOCYTES # BLD AUTO: 2.12 K/UL (ref 1–4.8)
LYMPHOCYTES NFR BLD: 30.2 % (ref 22–41)
MAGNESIUM SERPL-MCNC: 1.7 MG/DL (ref 1.5–2.5)
MCH RBC QN AUTO: 29.8 PG (ref 27–33)
MCHC RBC AUTO-ENTMCNC: 33.4 G/DL (ref 32.2–35.5)
MCV RBC AUTO: 89.2 FL (ref 81.4–97.8)
MICRO URNS: ABNORMAL
MONOCYTES # BLD AUTO: 0.44 K/UL (ref 0–0.85)
MONOCYTES NFR BLD AUTO: 6.3 % (ref 0–13.4)
NEUTROPHILS # BLD AUTO: 4.33 K/UL (ref 1.82–7.42)
NEUTROPHILS NFR BLD: 61.6 % (ref 44–72)
NITRITE UR QL STRIP.AUTO: POSITIVE
NRBC # BLD AUTO: 0 K/UL
NRBC BLD-RTO: 0 /100 WBC (ref 0–0.2)
PH UR STRIP.AUTO: 6.5 [PH] (ref 5–8)
PLATELET # BLD AUTO: 333 K/UL (ref 164–446)
PMV BLD AUTO: 8.9 FL (ref 9–12.9)
POTASSIUM SERPL-SCNC: 4.1 MMOL/L (ref 3.6–5.5)
PROT SERPL-MCNC: 7.3 G/DL (ref 6–8.2)
PROT UR QL STRIP: NEGATIVE MG/DL
RBC # BLD AUTO: 4.73 M/UL (ref 4.2–5.4)
RBC # URNS HPF: ABNORMAL /HPF (ref 0–2)
RBC UR QL AUTO: NEGATIVE
SODIUM SERPL-SCNC: 141 MMOL/L (ref 135–145)
SP GR UR STRIP.AUTO: 1.01
UROBILINOGEN UR STRIP.AUTO-MCNC: 1 EU/DL
WBC # BLD AUTO: 7 K/UL (ref 4.8–10.8)
WBC #/AREA URNS HPF: ABNORMAL /HPF

## 2025-04-01 PROCEDURE — 83605 ASSAY OF LACTIC ACID: CPT

## 2025-04-01 PROCEDURE — 700111 HCHG RX REV CODE 636 W/ 250 OVERRIDE (IP): Performed by: EMERGENCY MEDICINE

## 2025-04-01 PROCEDURE — 81001 URINALYSIS AUTO W/SCOPE: CPT

## 2025-04-01 PROCEDURE — 83735 ASSAY OF MAGNESIUM: CPT

## 2025-04-01 PROCEDURE — 700102 HCHG RX REV CODE 250 W/ 637 OVERRIDE(OP)

## 2025-04-01 PROCEDURE — 80053 COMPREHEN METABOLIC PANEL: CPT

## 2025-04-01 PROCEDURE — 99285 EMERGENCY DEPT VISIT HI MDM: CPT

## 2025-04-01 PROCEDURE — 85025 COMPLETE CBC W/AUTO DIFF WBC: CPT

## 2025-04-01 PROCEDURE — 96375 TX/PRO/DX INJ NEW DRUG ADDON: CPT

## 2025-04-01 PROCEDURE — 96374 THER/PROPH/DIAG INJ IV PUSH: CPT

## 2025-04-01 PROCEDURE — 770006 HCHG ROOM/CARE - MED/SURG/GYN SEMI*

## 2025-04-01 PROCEDURE — 96376 TX/PRO/DX INJ SAME DRUG ADON: CPT

## 2025-04-01 PROCEDURE — 36415 COLL VENOUS BLD VENIPUNCTURE: CPT

## 2025-04-01 PROCEDURE — 87086 URINE CULTURE/COLONY COUNT: CPT

## 2025-04-01 PROCEDURE — A9270 NON-COVERED ITEM OR SERVICE: HCPCS

## 2025-04-01 PROCEDURE — 74176 CT ABD & PELVIS W/O CONTRAST: CPT

## 2025-04-01 PROCEDURE — 87040 BLOOD CULTURE FOR BACTERIA: CPT

## 2025-04-01 PROCEDURE — 99223 1ST HOSP IP/OBS HIGH 75: CPT

## 2025-04-01 PROCEDURE — 700111 HCHG RX REV CODE 636 W/ 250 OVERRIDE (IP)

## 2025-04-01 RX ORDER — PROMETHAZINE HYDROCHLORIDE 25 MG/1
12.5-25 SUPPOSITORY RECTAL EVERY 4 HOURS PRN
Status: DISCONTINUED | OUTPATIENT
Start: 2025-04-01 | End: 2025-04-03 | Stop reason: HOSPADM

## 2025-04-01 RX ORDER — ONDANSETRON 2 MG/ML
4 INJECTION INTRAMUSCULAR; INTRAVENOUS EVERY 4 HOURS PRN
Status: DISCONTINUED | OUTPATIENT
Start: 2025-04-01 | End: 2025-04-03 | Stop reason: HOSPADM

## 2025-04-01 RX ORDER — ATORVASTATIN CALCIUM 40 MG/1
80 TABLET, FILM COATED ORAL NIGHTLY
Status: DISCONTINUED | OUTPATIENT
Start: 2025-04-01 | End: 2025-04-03 | Stop reason: HOSPADM

## 2025-04-01 RX ORDER — MORPHINE SULFATE 4 MG/ML
4 INJECTION INTRAVENOUS ONCE
Status: COMPLETED | OUTPATIENT
Start: 2025-04-01 | End: 2025-04-01

## 2025-04-01 RX ORDER — PROCHLORPERAZINE EDISYLATE 5 MG/ML
5-10 INJECTION INTRAMUSCULAR; INTRAVENOUS EVERY 4 HOURS PRN
Status: DISCONTINUED | OUTPATIENT
Start: 2025-04-01 | End: 2025-04-03 | Stop reason: HOSPADM

## 2025-04-01 RX ORDER — ONDANSETRON 2 MG/ML
4 INJECTION INTRAMUSCULAR; INTRAVENOUS ONCE
Status: COMPLETED | OUTPATIENT
Start: 2025-04-01 | End: 2025-04-01

## 2025-04-01 RX ORDER — LABETALOL HYDROCHLORIDE 5 MG/ML
10 INJECTION, SOLUTION INTRAVENOUS EVERY 4 HOURS PRN
Status: DISCONTINUED | OUTPATIENT
Start: 2025-04-01 | End: 2025-04-03 | Stop reason: HOSPADM

## 2025-04-01 RX ORDER — AMOXICILLIN 250 MG
2 CAPSULE ORAL EVERY EVENING
Status: DISCONTINUED | OUTPATIENT
Start: 2025-04-01 | End: 2025-04-03 | Stop reason: HOSPADM

## 2025-04-01 RX ORDER — CEFTRIAXONE 2 G/1
2000 INJECTION, POWDER, FOR SOLUTION INTRAMUSCULAR; INTRAVENOUS ONCE
Status: COMPLETED | OUTPATIENT
Start: 2025-04-01 | End: 2025-04-01

## 2025-04-01 RX ORDER — ACETAMINOPHEN 650 MG/1
650 SUPPOSITORY RECTAL EVERY 4 HOURS PRN
Status: DISCONTINUED | OUTPATIENT
Start: 2025-04-01 | End: 2025-04-03 | Stop reason: HOSPADM

## 2025-04-01 RX ORDER — ACETAMINOPHEN 325 MG/1
650 TABLET ORAL EVERY 4 HOURS PRN
Status: DISCONTINUED | OUTPATIENT
Start: 2025-04-01 | End: 2025-04-03 | Stop reason: HOSPADM

## 2025-04-01 RX ORDER — ENOXAPARIN SODIUM 100 MG/ML
40 INJECTION SUBCUTANEOUS DAILY
Status: DISCONTINUED | OUTPATIENT
Start: 2025-04-01 | End: 2025-04-03 | Stop reason: HOSPADM

## 2025-04-01 RX ORDER — METOPROLOL SUCCINATE 25 MG/1
25 TABLET, EXTENDED RELEASE ORAL DAILY
Status: DISCONTINUED | OUTPATIENT
Start: 2025-04-02 | End: 2025-04-03 | Stop reason: HOSPADM

## 2025-04-01 RX ORDER — POLYETHYLENE GLYCOL 3350 17 G/17G
1 POWDER, FOR SOLUTION ORAL
Status: DISCONTINUED | OUTPATIENT
Start: 2025-04-01 | End: 2025-04-03 | Stop reason: HOSPADM

## 2025-04-01 RX ORDER — PROMETHAZINE HYDROCHLORIDE 25 MG/1
12.5-25 TABLET ORAL EVERY 4 HOURS PRN
Status: DISCONTINUED | OUTPATIENT
Start: 2025-04-01 | End: 2025-04-03 | Stop reason: HOSPADM

## 2025-04-01 RX ORDER — MORPHINE SULFATE 4 MG/ML
4 INJECTION INTRAVENOUS EVERY 4 HOURS PRN
Status: DISCONTINUED | OUTPATIENT
Start: 2025-04-01 | End: 2025-04-03 | Stop reason: HOSPADM

## 2025-04-01 RX ORDER — OMEPRAZOLE 20 MG/1
20 CAPSULE, DELAYED RELEASE ORAL
Status: DISCONTINUED | OUTPATIENT
Start: 2025-04-02 | End: 2025-04-03 | Stop reason: HOSPADM

## 2025-04-01 RX ORDER — LISINOPRIL 10 MG/1
10 TABLET ORAL DAILY
Status: DISCONTINUED | OUTPATIENT
Start: 2025-04-02 | End: 2025-04-03 | Stop reason: HOSPADM

## 2025-04-01 RX ORDER — ONDANSETRON 4 MG/1
4 TABLET, ORALLY DISINTEGRATING ORAL EVERY 4 HOURS PRN
Status: DISCONTINUED | OUTPATIENT
Start: 2025-04-01 | End: 2025-04-03 | Stop reason: HOSPADM

## 2025-04-01 RX ADMIN — MORPHINE SULFATE 4 MG: 4 INJECTION INTRAVENOUS at 18:07

## 2025-04-01 RX ADMIN — CEFTRIAXONE SODIUM 2000 MG: 2 INJECTION, POWDER, FOR SOLUTION INTRAMUSCULAR; INTRAVENOUS at 18:43

## 2025-04-01 RX ADMIN — ONDANSETRON 4 MG: 2 INJECTION INTRAMUSCULAR; INTRAVENOUS at 17:20

## 2025-04-01 RX ADMIN — MORPHINE SULFATE 4 MG: 4 INJECTION INTRAVENOUS at 23:29

## 2025-04-01 RX ADMIN — ATORVASTATIN CALCIUM 80 MG: 40 TABLET, FILM COATED ORAL at 21:18

## 2025-04-01 RX ADMIN — MORPHINE SULFATE 4 MG: 4 INJECTION INTRAVENOUS at 17:21

## 2025-04-01 ASSESSMENT — PAIN DESCRIPTION - PAIN TYPE
TYPE: ACUTE PAIN

## 2025-04-01 ASSESSMENT — ENCOUNTER SYMPTOMS
NEUROLOGICAL NEGATIVE: 1
EYES NEGATIVE: 1
RESPIRATORY NEGATIVE: 1
VOMITING: 0
FEVER: 0
NAUSEA: 1
FLANK PAIN: 1
CHILLS: 1
PSYCHIATRIC NEGATIVE: 1
CARDIOVASCULAR NEGATIVE: 1

## 2025-04-01 ASSESSMENT — PATIENT HEALTH QUESTIONNAIRE - PHQ9
1. LITTLE INTEREST OR PLEASURE IN DOING THINGS: NOT AT ALL
2. FEELING DOWN, DEPRESSED, IRRITABLE, OR HOPELESS: NOT AT ALL
SUM OF ALL RESPONSES TO PHQ9 QUESTIONS 1 AND 2: 0

## 2025-04-01 ASSESSMENT — SOCIAL DETERMINANTS OF HEALTH (SDOH)
WITHIN THE LAST YEAR, HAVE TO BEEN RAPED OR FORCED TO HAVE ANY KIND OF SEXUAL ACTIVITY BY YOUR PARTNER OR EX-PARTNER?: NO
WITHIN THE LAST YEAR, HAVE YOU BEEN KICKED, HIT, SLAPPED, OR OTHERWISE PHYSICALLY HURT BY YOUR PARTNER OR EX-PARTNER?: NO
WITHIN THE LAST YEAR, HAVE YOU BEEN AFRAID OF YOUR PARTNER OR EX-PARTNER?: NO
WITHIN THE LAST YEAR, HAVE YOU BEEN HUMILIATED OR EMOTIONALLY ABUSED IN OTHER WAYS BY YOUR PARTNER OR EX-PARTNER?: NO

## 2025-04-01 NOTE — ED TRIAGE NOTES
Leslie Nazario  50 y.o. female  Chief Complaint   Patient presents with    Flank Pain     Pt having left sided flank pain 1x day. Pt has only one kidney. Pt was treated for a uti at  yesterday and instructed to immediately come to the ER if she began experiencing any flank pain.        Vitals:    04/01/25 1517   BP: (!) 161/104   Pulse: 97   Resp: 16   Temp: 36.6 °C (97.8 °F)   SpO2: 98%       Patient educated on triage process and encouraged to alert staff of any changes in condition.

## 2025-04-01 NOTE — ED NOTES
Patient ambulated to [facility] without incident. Primary assessment complete. Chart up for ERP.

## 2025-04-01 NOTE — ED PROVIDER NOTES
"ER Provider Note    Scribed for Danyel Wong D.O. by Robby Felipe. 4/1/2025  4:02 PM    Primary Care Provider: Flavio Junior M.D.    CHIEF COMPLAINT  Chief Complaint   Patient presents with    Flank Pain     Pt having left sided flank pain 1x day. Pt has only one kidney. Pt was treated for a uti at  yesterday and instructed to immediately come to the ER if she began experiencing any flank pain.      HPI/ROS    OUTSIDE HISTORIAN(S):  Patient's friend present at bedside    Leslie Nazario is a 50 y.o. female who presents to the Emergency Department for evaluation of left sided flank pain onset one day ago. The patient explains that she first began to feel generally \"unwell\" on Friday of last week. On Saturday the patient developed some dysuria, suprapubic tenderness, and polyuria so she decided to be seen by urgent care yesterday. There the patient was found to have a UTI so she was given a Bactrim prescription which she has since been compliant with. Today the patient reportedly developed some left sided flank pain and she was advised by urgent care to present to the ED if this occurred, so she presented here for evaluation. She reports her current pain as a 5/10. States associated nausea, chills, and hot/cold flashes. Denies any fevers, nausea, or vomiting. She endorses a history of kidney stones and the patient had a right nephrectomy in 2010 for treatment of this. She adds a history of frequent UTI's, but the patient notes the last one she had was about a year ago.     ROS as per HPI.    PAST MEDICAL HISTORY  Past Medical History:   Diagnosis Date    Depression     HTN     resolved 8/10    Pain 01-24-14    abd., 0/10    Pain     right hip    Renal disorder     right nephrectomy 2010    S/p nephrectomy 2010    right removed     SURGICAL HISTORY  Past Surgical History:   Procedure Laterality Date    HIP ARTHROSCOPY  12/18/2014    Performed by Benji Lott M.D. at SURGERY [facility]" "FEMORAL NECK OSTEOPLASTY  12/18/2014    Performed by Benji Lott M.D. at SURGERY [facility]    ACETABULAR OSTEOPLASTY  12/18/2014    Performed by Benji Lott M.D. at SURGERY [facility]    SYNOVECTOMY  12/18/2014    Performed by Benji Lott M.D. at SURGERY [facility]    LAPAROSCOPY  1/27/2014    Performed by Ihsan Dior M.D. at SURGERY [facility]    LYSIS ADHESIONS GENERAL  1/27/2014    Performed by Ihsan Dior M.D. at SURGERY [facility]    CASSANDRA BY LAPAROSCOPY  12/11/2013    Performed by Ihsan Dior M.D. at SURGERY [facility]    CHOLECYSTECTOMY  2013    NEPHRECTOMY LAPAROSCOPIC  2010    Right- Performed by NINA CORREA at SURGERY [facility]    ID NEPHRECTOMY  2010    CYSTOSCOPY STENT PLACEMENT  8/11/2009    Performed by KIMBERLY PALACIOS at SURGERY [facility]    URETEROSCOPY  8/11/2009    Performed by KIMBERLY PALACIOS at SURGERY [facility]    STONE RETRIEVAL  8/11/2009    Performed by KIMBERLY PALACIOS at SURGERY [facility]    STENT REMOVAL  8/11/2009    Performed by KIMBERLY PALACIOS at SURGERY [facility]    LAPAROTOMY  2009    bladder and ureter reconstruction    URETERAL REIMPLANTATION  4/23/08    Performed by KIMBERLY PALACIOS at SURGERY [facility]    STENT PLACEMENT  4/23/08    Performed by KIMBERLY PALACIOS at SURGERY [facility]    LYSIS ADHESIONS GENERAL  5/06    ABDOMINAL HYSTERECTOMY TOTAL  2005    Hysterectomy, Total Abdominal    LAPAROTOMY  2005    TONSILLECTOMY  2004    APPENDECTOMY  1991    IR-URETERAL STENT ANTEGRADE      R side    OTHER  [id number]    \"multiple procedures prior to nephrectom , stent placement/ nephrostomy tube     URETEROSCOPY      partial removal of ureter with reimplantation     FAMILY HISTORY  Family History   Problem Relation Age of Onset    Hypertension Mother     Hypertension Father     Hypertension Brother     Genitourinary () Problems Brother         " "kidney stones     SOCIAL HISTORY   reports that she has never smoked. She has never used smokeless tobacco. She reports that she does not currently use alcohol. She reports that she does not use drugs.    CURRENT MEDICATIONS  Current Discharge Medication List        CONTINUE these medications which have NOT CHANGED    Details   metFORMIN ER (GLUCOPHAGE XR) 500 MG TABLET SR 24 HR Take 1,000 mg by mouth 2 times a day. 1,000 mg = 2 tabs      phentermine (ADIPEX-P) 37.5 MG tablet Take 37.5 mg by mouth every morning.      sulfamethoxazole-trimethoprim (BACTRIM DS) 800-160 MG tablet Take 1 Tablet by mouth 2 times a day for 3 days.  Qty: 6 Tablet, Refills: 0    Associated Diagnoses: Dysuria      phenazopyridine (PYRIDIUM) 200 MG Tab Take 1 Tablet by mouth 3 times a day as needed for Severe Pain.  Qty: 30 Tablet, Refills: 0    Associated Diagnoses: Dysuria      metoprolol SR (TOPROL XL) 25 MG TABLET SR 24 HR TAKE 1 TABLET BY MOUTH EVERY DAY  Qty: 90 Tablet, Refills: 1    Associated Diagnoses: Essential hypertension      lisinopril (PRINIVIL) 10 MG Tab TAKE 1 TABLET BY MOUTH EVERY DAY  Qty: 90 Tablet, Refills: 3    Associated Diagnoses: Essential hypertension      atorvastatin (LIPITOR) 80 MG tablet Take 1 Tablet by mouth every evening.  Qty: 90 Tablet, Refills: 3      esomeprazole (NEXIUM) 20 MG capsule Take 1 Capsule by mouth every morning before breakfast.  Qty: 90 Capsule, Refills: 3      aspirin (ASPIRIN LOW DOSE) 81 MG Chew Tab chewable tablet CHEW AND SWALLOW 1 TABLET ORALLY EVERY DAY  Qty: 90 Tablet, Refills: 0           ALLERGIES  Patient has no known allergies.    PHYSICAL EXAM  BP (!) 161/104   Pulse 97   Temp 36.6 °C (97.8 °F) (Temporal)   Resp 16   Ht 1.702 m (5' 7\")   Wt 96.9 kg (213 lb 10 oz)   SpO2 98%   BMI 33.46 kg/m²     General: No acute distress.  HENT: Normocephalic, Mucus membranes are moist.   Chest: Lungs have even and unlabored respirations, Clear to auscultation.   Cardiovascular: Regular " rate and regular rhythm, No peripheral cyanosis.  Abdomen: Non distended. Mild suprapubic tenderness  Back: Left flank tenderness  Neuro: Awake, Conversive, Able to relay recent events.  Psychiatric: Calm and cooperative.     INITIAL ASSESSMENT  The patient has a unilateral kidney on the left with UTI symptoms. She is already on antibiotics with symptoms unchanging and is now developing flank pain. Will CT to evaluate for a stone and she does have a reported history of stone. Also plan to start antibiotics. Discussed with patient the need for a straight cath to provide the best possible urine sample.     ED Observation Status? No; Patient does not meet criteria for ED Observation.     DIAGNOSTIC STUDIES    Labs:   Results for orders placed or performed during the hospital encounter of 04/01/25   Blood Culture - Draw one from central line and one from peripheral site    Collection Time: 04/01/25  4:28 PM    Specimen: Line; Blood   Result Value Ref Range    Significant Indicator NEG     Source BLD     Site PERIPHERAL     Culture Result       No Growth  Note: Blood cultures are incubated for 5 days and  are monitored continuously.Positive blood cultures  are called to the RN and reported as soon as  they are identified.     Lactic Acid    Collection Time: 04/01/25  4:29 PM   Result Value Ref Range    Lactic Acid 1.8 0.5 - 2.0 mmol/L   CBC with Differential    Collection Time: 04/01/25  4:29 PM   Result Value Ref Range    WBC 7.0 4.8 - 10.8 K/uL    RBC 4.73 4.20 - 5.40 M/uL    Hemoglobin 14.1 12.0 - 16.0 g/dL    Hematocrit 42.2 37.0 - 47.0 %    MCV 89.2 81.4 - 97.8 fL    MCH 29.8 27.0 - 33.0 pg    MCHC 33.4 32.2 - 35.5 g/dL    RDW 42.6 35.9 - 50.0 fL    Platelet Count 333 164 - 446 K/uL    MPV 8.9 (L) 9.0 - 12.9 fL    Neutrophils-Polys 61.60 44.00 - 72.00 %    Lymphocytes 30.20 22.00 - 41.00 %    Monocytes 6.30 0.00 - 13.40 %    Eosinophils 0.90 0.00 - 6.90 %    Basophils 0.60 0.00 - 1.80 %    Immature Granulocytes 0.40  0.00 - 0.90 %    Nucleated RBC 0.00 0.00 - 0.20 /100 WBC    Neutrophils (Absolute) 4.33 1.82 - 7.42 K/uL    Lymphs (Absolute) 2.12 1.00 - 4.80 K/uL    Monos (Absolute) 0.44 0.00 - 0.85 K/uL    Eos (Absolute) 0.06 0.00 - 0.51 K/uL    Baso (Absolute) 0.04 0.00 - 0.12 K/uL    Immature Granulocytes (abs) 0.03 0.00 - 0.11 K/uL    NRBC (Absolute) 0.00 K/uL   Complete Metabolic Panel    Collection Time: 04/01/25  4:29 PM   Result Value Ref Range    Sodium 141 135 - 145 mmol/L    Potassium 4.1 3.6 - 5.5 mmol/L    Chloride 104 96 - 112 mmol/L    Co2 24 20 - 33 mmol/L    Anion Gap 13.0 7.0 - 16.0    Glucose 88 65 - 99 mg/dL    Bun 10 8 - 22 mg/dL    Creatinine 0.81 0.50 - 1.40 mg/dL    Calcium 9.8 8.5 - 10.5 mg/dL    Correct Calcium 9.6 8.5 - 10.5 mg/dL    AST(SGOT) 21 12 - 45 U/L    ALT(SGPT) 20 2 - 50 U/L    Alkaline Phosphatase 85 30 - 99 U/L    Total Bilirubin 0.5 0.1 - 1.5 mg/dL    Albumin 4.2 3.2 - 4.9 g/dL    Total Protein 7.3 6.0 - 8.2 g/dL    Globulin 3.1 1.9 - 3.5 g/dL    A-G Ratio 1.4 g/dL   ESTIMATED GFR    Collection Time: 04/01/25  4:29 PM   Result Value Ref Range    GFR (CKD-EPI) 88 >60 mL/min/1.73 m 2   MAGNESIUM    Collection Time: 04/01/25  4:29 PM   Result Value Ref Range    Magnesium 1.7 1.5 - 2.5 mg/dL   Blood Culture - Draw one from central line and one from peripheral site    Collection Time: 04/01/25  4:46 PM    Specimen: Peripheral; Blood   Result Value Ref Range    Significant Indicator NEG     Source BLD     Site PERIPHERAL     Culture Result       No Growth  Note: Blood cultures are incubated for 5 days and  are monitored continuously.Positive blood cultures  are called to the RN and reported as soon as  they are identified.     Urinalysis    Collection Time: 04/01/25  5:00 PM    Specimen: Urine, Cath   Result Value Ref Range    Color Dark Yellow     Character Clear     Specific Gravity 1.012 <1.035    Ph 6.5 5.0 - 8.0    Glucose Negative Negative mg/dL    Ketones Negative Negative mg/dL    Protein  Negative Negative mg/dL    Bilirubin Negative Negative    Urobilinogen, Urine 1.0 <=1.0 EU/dL    Nitrite Positive (A) Negative    Leukocyte Esterase Negative Negative    Occult Blood Negative Negative    Micro Urine Req Microscopic    URINE MICROSCOPIC (W/UA)    Collection Time: 04/01/25  5:00 PM   Result Value Ref Range    WBC 0-2 /hpf    RBC 0-2 0 - 2 /hpf    Bacteria Rare (A) None /hpf    Epithelial Cells 0-2 0 - 5 /hpf    Urine Casts 0-2 0 - 2 /lpf     *Note: Due to a large number of results and/or encounters for the requested time period, some results have not been displayed. A complete set of results can be found in Results Review.     Radiology:   The attending emergency physician has independently interpreted the diagnostic imaging associated with this visit and am waiting the final reading from the radiologist.   Preliminary interpretation is as follows: No ureterolithiasis  Radiologist interpretation:   CT-RENAL COLIC EVALUATION(A/P W/O)   Final Result         1.  Tiny nonobstructing left renal stone. No obstructive uropathy.   2.  Status post right nephrectomy, cholecystectomy, hysterectomy and probable appendectomy.   3.  No acute inflammatory abnormality.                 COURSE & MEDICAL DECISION MAKING     COURSE AND PLAN  4:02 PM - Patient seen and examined at bedside. Discussed plan of care, including labs and imaging to evaluate and medication for treatment of the patient's pain. Also explained to the patient that a straight cath UA would provide the best sample which she agrees with. Patient agrees to the plan of care. Ordered for CT-Renal Colic, Blood Cultures X2, UA, CMP, Lactic Acid + every 2 hours, and CBC w/Diff to evaluate her symptoms.     5:09 PM - The patient will be medicated with Morphine 4 mg injection and Zofran 4 mg injection.     6:02 PM - The patient will be medicated with an additional dose of morphine 4 mg injection.     6:26 PM - Patient was reevaluated at bedside. The patient  informed me that she is currently feeling improved. Discussed concerns for pyelonephritis and consideration for hospitalization which the patient verbalizes agreement with. The patient will be medicated with Rocephin 2,000 mg injection. [place] hospitalist.     6:48 PM - I discussed the patient's case and the above findings with residency services who agrees to evaluate the patient for hospitalization.    ED Summary: This patient has UTI symptoms, she had been on antibiotics for several days without effect, now she is developing left flank pain.  She only has 1 kidney on the left.  Straight cath shows some bacteria and nitrites.  This may be an infection, cultures are pending.  She was started on antibiotics medicated for pain with good result galls and due to concerns of pyelonephritis and having only 1 kidney the patient will be admitted for IV antibiotics pending cultures    DISPOSITION AND DISCUSSIONS  I have discussed management of the patient with the following physicians and VANESSA's: Residency hospitalist services    Discussion of management with other QHP or appropriate source(s): None    Barriers to care at this time, including but not limited to: None     DISPOSITION:  Patient will be hospitalized by residency hospitalist services in guarded condition.    FINAL DIAGNOSIS  1. Pyelonephritis    2. Status post nephrectomy    3. Flank pain      Robby CONWAY (Scribe), am scribing for, and in the presence of, Danyel Wong D.O..    Electronically signed by: Robby Felipe (Scribe), 4/1/2025    IDanyel D.O. personally performed the services described in this documentation, as scribed by Robby Felipe in my presence, and it is both accurate and complete.     The note accurately reflects work and decisions made by me.  Danyel Wong D.O.  4/1/2025  10:47 PM

## 2025-04-02 PROBLEM — N12 PYELONEPHRITIS: Status: ACTIVE | Noted: 2025-04-01

## 2025-04-02 LAB
ALBUMIN SERPL BCP-MCNC: 3.5 G/DL (ref 3.2–4.9)
ALBUMIN/GLOB SERPL: 1.5 G/DL
ALP SERPL-CCNC: 69 U/L (ref 30–99)
ALT SERPL-CCNC: 14 U/L (ref 2–50)
ANION GAP SERPL CALC-SCNC: 11 MMOL/L (ref 7–16)
AST SERPL-CCNC: 15 U/L (ref 12–45)
BASOPHILS # BLD AUTO: 0.8 % (ref 0–1.8)
BASOPHILS # BLD: 0.05 K/UL (ref 0–0.12)
BILIRUB SERPL-MCNC: 0.5 MG/DL (ref 0.1–1.5)
BUN SERPL-MCNC: 11 MG/DL (ref 8–22)
CALCIUM ALBUM COR SERPL-MCNC: 9.5 MG/DL (ref 8.5–10.5)
CALCIUM SERPL-MCNC: 9.1 MG/DL (ref 8.5–10.5)
CHLORIDE SERPL-SCNC: 106 MMOL/L (ref 96–112)
CO2 SERPL-SCNC: 24 MMOL/L (ref 20–33)
CREAT SERPL-MCNC: 0.77 MG/DL (ref 0.5–1.4)
EOSINOPHIL # BLD AUTO: 0.1 K/UL (ref 0–0.51)
EOSINOPHIL NFR BLD: 1.6 % (ref 0–6.9)
ERYTHROCYTE [DISTWIDTH] IN BLOOD BY AUTOMATED COUNT: 43.1 FL (ref 35.9–50)
GFR SERPLBLD CREATININE-BSD FMLA CKD-EPI: 94 ML/MIN/1.73 M 2
GLOBULIN SER CALC-MCNC: 2.4 G/DL (ref 1.9–3.5)
GLUCOSE SERPL-MCNC: 102 MG/DL (ref 65–99)
HCT VFR BLD AUTO: 37.8 % (ref 37–47)
HGB BLD-MCNC: 12.4 G/DL (ref 12–16)
IMM GRANULOCYTES # BLD AUTO: 0.03 K/UL (ref 0–0.11)
IMM GRANULOCYTES NFR BLD AUTO: 0.5 % (ref 0–0.9)
LYMPHOCYTES # BLD AUTO: 2.66 K/UL (ref 1–4.8)
LYMPHOCYTES NFR BLD: 41.8 % (ref 22–41)
MCH RBC QN AUTO: 29.7 PG (ref 27–33)
MCHC RBC AUTO-ENTMCNC: 32.8 G/DL (ref 32.2–35.5)
MCV RBC AUTO: 90.6 FL (ref 81.4–97.8)
MONOCYTES # BLD AUTO: 0.47 K/UL (ref 0–0.85)
MONOCYTES NFR BLD AUTO: 7.4 % (ref 0–13.4)
NEUTROPHILS # BLD AUTO: 3.06 K/UL (ref 1.82–7.42)
NEUTROPHILS NFR BLD: 47.9 % (ref 44–72)
NRBC # BLD AUTO: 0 K/UL
NRBC BLD-RTO: 0 /100 WBC (ref 0–0.2)
PLATELET # BLD AUTO: 292 K/UL (ref 164–446)
PMV BLD AUTO: 9.2 FL (ref 9–12.9)
POTASSIUM SERPL-SCNC: 3.8 MMOL/L (ref 3.6–5.5)
PROT SERPL-MCNC: 5.9 G/DL (ref 6–8.2)
RBC # BLD AUTO: 4.17 M/UL (ref 4.2–5.4)
SODIUM SERPL-SCNC: 141 MMOL/L (ref 135–145)
WBC # BLD AUTO: 6.4 K/UL (ref 4.8–10.8)

## 2025-04-02 PROCEDURE — 700105 HCHG RX REV CODE 258

## 2025-04-02 PROCEDURE — 80053 COMPREHEN METABOLIC PANEL: CPT

## 2025-04-02 PROCEDURE — A9270 NON-COVERED ITEM OR SERVICE: HCPCS

## 2025-04-02 PROCEDURE — 85025 COMPLETE CBC W/AUTO DIFF WBC: CPT

## 2025-04-02 PROCEDURE — 770006 HCHG ROOM/CARE - MED/SURG/GYN SEMI*

## 2025-04-02 PROCEDURE — 700102 HCHG RX REV CODE 250 W/ 637 OVERRIDE(OP)

## 2025-04-02 PROCEDURE — 99233 SBSQ HOSP IP/OBS HIGH 50: CPT | Performed by: INTERNAL MEDICINE

## 2025-04-02 PROCEDURE — 700111 HCHG RX REV CODE 636 W/ 250 OVERRIDE (IP)

## 2025-04-02 RX ORDER — MORPHINE SULFATE 4 MG/ML
4 INJECTION INTRAVENOUS ONCE
Status: COMPLETED | OUTPATIENT
Start: 2025-04-02 | End: 2025-04-02

## 2025-04-02 RX ADMIN — ACETAMINOPHEN 650 MG: 325 TABLET ORAL at 17:33

## 2025-04-02 RX ADMIN — MORPHINE SULFATE 4 MG: 4 INJECTION INTRAVENOUS at 01:04

## 2025-04-02 RX ADMIN — CEFTRIAXONE SODIUM 2000 MG: 10 INJECTION, POWDER, FOR SOLUTION INTRAVENOUS at 17:34

## 2025-04-02 RX ADMIN — OMEPRAZOLE 20 MG: 20 CAPSULE, DELAYED RELEASE ORAL at 08:32

## 2025-04-02 RX ADMIN — ATORVASTATIN CALCIUM 80 MG: 40 TABLET, FILM COATED ORAL at 20:38

## 2025-04-02 RX ADMIN — METOPROLOL SUCCINATE 25 MG: 25 TABLET, EXTENDED RELEASE ORAL at 05:29

## 2025-04-02 RX ADMIN — ACETAMINOPHEN 650 MG: 325 TABLET ORAL at 05:29

## 2025-04-02 RX ADMIN — LISINOPRIL 10 MG: 10 TABLET ORAL at 05:29

## 2025-04-02 SDOH — ECONOMIC STABILITY: TRANSPORTATION INSECURITY
IN THE PAST 12 MONTHS, HAS LACK OF RELIABLE TRANSPORTATION KEPT YOU FROM MEDICAL APPOINTMENTS, MEETINGS, WORK OR FROM GETTING THINGS NEEDED FOR DAILY LIVING?: NO

## 2025-04-02 SDOH — ECONOMIC STABILITY: TRANSPORTATION INSECURITY
IN THE PAST 12 MONTHS, HAS THE LACK OF TRANSPORTATION KEPT YOU FROM MEDICAL APPOINTMENTS OR FROM GETTING MEDICATIONS?: NO

## 2025-04-02 ASSESSMENT — FIBROSIS 4 INDEX: FIB4 SCORE: 0.71

## 2025-04-02 ASSESSMENT — LIFESTYLE VARIABLES
ON A TYPICAL DAY WHEN YOU DRINK ALCOHOL HOW MANY DRINKS DO YOU HAVE: 0
CONSUMPTION TOTAL: NEGATIVE
DOES PATIENT WANT TO STOP DRINKING: NO
EVER FELT BAD OR GUILTY ABOUT YOUR DRINKING: NO
ALCOHOL_USE: NO
EVER HAD A DRINK FIRST THING IN THE MORNING TO STEADY YOUR NERVES TO GET RID OF A HANGOVER: NO
HAVE YOU EVER FELT YOU SHOULD CUT DOWN ON YOUR DRINKING: NO
TOTAL SCORE: 0
TOTAL SCORE: 0
HAVE PEOPLE ANNOYED YOU BY CRITICIZING YOUR DRINKING: NO
HOW MANY TIMES IN THE PAST YEAR HAVE YOU HAD 5 OR MORE DRINKS IN A DAY: 0
TOTAL SCORE: 0
AVERAGE NUMBER OF DAYS PER WEEK YOU HAVE A DRINK CONTAINING ALCOHOL: 0

## 2025-04-02 ASSESSMENT — PATIENT HEALTH QUESTIONNAIRE - PHQ9
SUM OF ALL RESPONSES TO PHQ9 QUESTIONS 1 AND 2: 0
2. FEELING DOWN, DEPRESSED, IRRITABLE, OR HOPELESS: NOT AT ALL
1. LITTLE INTEREST OR PLEASURE IN DOING THINGS: NOT AT ALL
2. FEELING DOWN, DEPRESSED, IRRITABLE, OR HOPELESS: NOT AT ALL
SUM OF ALL RESPONSES TO PHQ9 QUESTIONS 1 AND 2: 0
1. LITTLE INTEREST OR PLEASURE IN DOING THINGS: NOT AT ALL

## 2025-04-02 ASSESSMENT — ENCOUNTER SYMPTOMS
SHORTNESS OF BREATH: 0
DOUBLE VISION: 0
NAUSEA: 0
HEADACHES: 0
PALPITATIONS: 0
COUGH: 0
FALLS: 0
VOMITING: 0
FLANK PAIN: 1
BLURRED VISION: 0

## 2025-04-02 ASSESSMENT — SOCIAL DETERMINANTS OF HEALTH (SDOH)
WITHIN THE PAST 12 MONTHS, YOU WORRIED THAT YOUR FOOD WOULD RUN OUT BEFORE YOU GOT THE MONEY TO BUY MORE: NEVER TRUE
IN THE PAST 12 MONTHS, HAS THE ELECTRIC, GAS, OIL, OR WATER COMPANY THREATENED TO SHUT OFF SERVICE IN YOUR HOME?: NO
WITHIN THE PAST 12 MONTHS, THE FOOD YOU BOUGHT JUST DIDN'T LAST AND YOU DIDN'T HAVE MONEY TO GET MORE: NEVER TRUE
WITHIN THE PAST 12 MONTHS, YOU WORRIED THAT YOUR FOOD WOULD RUN OUT BEFORE YOU GOT THE MONEY TO BUY MORE: NEVER TRUE
IN THE PAST 12 MONTHS, HAS THE ELECTRIC, GAS, OIL, OR WATER COMPANY THREATENED TO SHUT OFF SERVICE IN YOUR HOME?: NO
WITHIN THE PAST 12 MONTHS, THE FOOD YOU BOUGHT JUST DIDN'T LAST AND YOU DIDN'T HAVE MONEY TO GET MORE: NEVER TRUE

## 2025-04-02 ASSESSMENT — PAIN DESCRIPTION - PAIN TYPE
TYPE: ACUTE PAIN

## 2025-04-02 NOTE — CARE PLAN
The patient is Stable - Low risk of patient condition declining or worsening    Shift Goals  Clinical Goals: Pt will report decreased pain levels. Pt will maintain adequate urine output.  Patient Goals: Rest  Family Goals: JOHN    Pt remains A&Ox4 and on RA. Pt has been able to urinate an adequate amount.    Progress made toward(s) clinical / shift goals:    Problem: Pain - Standard  Goal: Alleviation of pain or a reduction in pain to the patient’s comfort goal  4/2/2025 0356 by Mackenzie Hudson R.N.  Outcome: Progressing  Note: Pt pain has been managed per MAR. Orders updated.  4/2/2025 0355 by Mackenzie Hudson R.N.  Outcome: Progressing  Note: Pt pain was managed per MAR. Orders updated.     Problem: Safety  Goal: Will remain free from falls  4/2/2025 0356 by Mackenzie Hudson R.N.  Outcome: Progressing  Note: Pt remained free from falls throughout the shift.   4/2/2025 0355 by Mackenzie Hudson R.N.  Outcome: Progressing  Note: Pt remained free from falls throughout the shift.       Patient is not progressing towards the following goals:

## 2025-04-02 NOTE — ED NOTES
Med Rec complete per patient   Allergies reviewed  Antibiotics in the past 30 days:yes  Anticoagulant in past 14 days:no  [place] [place] [address] [facility] [place]    Pt started Bactrim DS yesterday (03/31/25)

## 2025-04-02 NOTE — PROGRESS NOTES
Patient report received and assumed care. Assessed patient at 0800. Patient is Aox4 and reports 2/10 pain in flank and bladder. Patient report burning on urination. Patient reports urine color to be vanessa. Patient is up self and continent of both. Patient bed is in low, locked position. Standard fall precautions are in place. Personal belonging and call light are within reach. Patient reinforced to use call light when needed.

## 2025-04-02 NOTE — PROGRESS NOTES
Report received from ED RN report and assumed patient care at 1927. Patient is A&Ox 4, on RA, and awake and alert. Patient reporting a pain level of 4/10. Pt declines interventions at this this time. Call light within reach and bed in lowest position. Reinforced the need to call for assistance. Plan of care discussed and patient does not have any further needs at this time.

## 2025-04-02 NOTE — CARE PLAN
The patient is Stable - Low risk of patient condition declining or worsening    Shift Goals  Clinical Goals: Patient pain will remain 3/10 during course of shift  Patient Goals: rest, pain  Family Goals: JOHN    Progress made toward(s) clinical / shift goals:      Problem: Knowledge Deficit - Standard  Goal: Patient and family/care givers will demonstrate understanding of plan of care, disease process/condition, diagnostic tests and medications  Outcome: Progressing    Patient educated on plan of care, medications, and procedures. Patient is Aox4. Patient verbalizes understanding of care. Will continue to update patient on Plan of care during shift.     Problem: Pain - Standard  Goal: Alleviation of pain or a reduction in pain to the patient’s comfort goal  Outcome: Progressing    Patient report 2-3/10 pain during course of shift in bladder. Pain managed with heat and PRN pain medications as per ordered in MAR.     Problem: Urinary Elimination:  Goal: Ability to reestablish a normal urinary elimination pattern will improve  Outcome: Progressing    Patient voiding frequently during shift. Patient reports burning with urination. Patient urine is vanessa in color.     Patient is not progressing towards the following goals:

## 2025-04-02 NOTE — H&P
[facility] Internal Medicine H&P    Date of consult: 4/1/2025  Resident: Sandra Reyes M.D.  Attending:  Dr. Aakash Lawson      Chief Complaint  Flank Pain (Pt having left sided flank pain 1x day. Pt has only one kidney. Pt was treated for a uti at  yesterday and instructed to immediately come to the ER if she began experiencing any flank pain. )      History of Present Illness    Leslie Nazario is a 50 y.o. female with a PMHx significant for HLD, GERD, HTN, T2DM (A1c 5.3 6/2024, on metformin), hx of R nephrectomy 2010 (for longstanding hx of renal stones), hx of frequent UTIs (most recent 2024) who presented on 4/1/2025 with L flank pain.     Has been feeling general malaise since last Friday 3/28. On Saturday 3/29 started having dysuria, urinary urgency, polyuria, suprapubic tenderness and symptoms were not resolving so went to urgent care yesterday 3/31 and was prescribed bactrim DS BID for 3 days which she states she has been taking and was advised to present to hospital if she devleoped any flank pain. Also has been taking pyridium 200mg TID prn (bought over the counter) and tylenol but without improvement in symptoms. This morning still having urinary symptoms and also developed new L flank pain so presented to ED. Currently pain 5/10, also having nausea, chills, hot/cold flashes. Denies fever, hematuria, other symptoms. Of note only has one kidney (left), s/p R nephrectomy 2010 for nephrolithiasis. She states frequent hx of UTIs, most recent in 2024.     In ED, HTN to 161/104, HR 97, afebrile. Exam remarkable for L flank tenderness, no CVA tenderness. CBC, CMP grossly unremarkable. Lactic acid wnl. Was straight cathed in ED for urine sample, UA showing positive nitrites, negative for leuk esterase and bacteria. CT renal w/o showing tiny nonobstructing left renal stone without obstructive uropathy and no acute inflammatory abnormalities. Given IV morphine 4mg x2 and IV zofran 4mg with symptom improvement.  Received ceftriaxone 2g in ED.     Code Status  Full code    Past Medical History   has a past medical history of Depression, HTN, Pain (01-24-14), Pain, Renal disorder, and S/p nephrectomy (2010).    Past Surgical History   has a past surgical history that includes lysis adhesions general (5/06); IR-URETERAL STENT ANTEGRADE; ureteral reimplantation (4/23/08); stent placement (4/23/08); ureteroscopy; cystoscopy stent placement (8/11/2009); ureteroscopy (8/11/2009); stone retrieval (8/11/2009); stent removal (8/11/2009); nephrectomy laparoscopic (2010); roe by laparoscopy (12/11/2013); abdominal hysterectomy total (2005); cholecystectomy (2013); appendectomy (1991); laparoscopy (1/27/2014); lysis adhesions general (1/27/2014); pr nephrectomy (2010); tonsillectomy (2004); laparotomy (2005); laparotomy (2009); other ([id number]); hip arthroscopy (12/18/2014); femoral neck osteoplasty (12/18/2014); acetabular osteoplasty (12/18/2014); and synovectomy (12/18/2014).    Medications  Home Medications       Reviewed by Courtney Jimenez (Pharmacy Tech) on 04/01/25 at 1909  Med List Status: Complete     Medication Last Dose Status   aspirin (ASPIRIN LOW DOSE) 81 MG Chew Tab chewable tablet 3/18/2025 Active   atorvastatin (LIPITOR) 80 MG tablet 3/31/2025 Active   esomeprazole (NEXIUM) 20 MG capsule 4/1/2025 Active   lisinopril (PRINIVIL) 10 MG Tab 4/1/2025 Active   metFORMIN ER (GLUCOPHAGE XR) 500 MG TABLET SR 24 HR 4/1/2025 Active   metoprolol SR (TOPROL XL) 25 MG TABLET SR 24 HR 4/1/2025 Active   phenazopyridine (PYRIDIUM) 200 MG Tab 4/1/2025 Active   phentermine (ADIPEX-P) 37.5 MG tablet 4/1/2025 Active   sulfamethoxazole-trimethoprim (BACTRIM DS) 800-160 MG tablet 4/1/2025 Active                  Audit from Redirected Encounters    **Home medications have not yet been reviewed for this encounter**         Allergies  No Known Allergies    Social History   reports that she has never smoked. She has never used smokeless  tobacco. She reports that she does not currently use alcohol. She reports that she does not use drugs.     Family History  family history includes Genitourinary () Problems in her brother; Hypertension in her brother, father, and mother.    Review of Systems  Review of Systems   Constitutional:  Positive for chills and malaise/fatigue. Negative for fever.   HENT: Negative.     Eyes: Negative.    Respiratory: Negative.     Cardiovascular: Negative.    Gastrointestinal:  Positive for nausea. Negative for vomiting.   Genitourinary:  Positive for dysuria, flank pain, frequency and urgency. Negative for hematuria.   Skin: Negative.    Neurological: Negative.    Endo/Heme/Allergies: Negative.    Psychiatric/Behavioral: Negative.         Vital Signs last 24 hours  Temp:  [36.1 °C (97 °F)-36.6 °C (97.8 °F)] 36.1 °C (97 °F)  Pulse:  [79-97] 84  Resp:  [16-18] 18  BP: (124-161)/() 126/83  SpO2:  [92 %-100 %] 93 %  O2 therapy: Pulse Oximetry: 93 %, O2 (LPM): 0, O2 Delivery Device: None - Room Air          I/O's  Intake/Output       None             Physical Exam  Physical Exam  Vitals and nursing note reviewed.   Constitutional:       General: She is not in acute distress.     Appearance: Normal appearance. She is obese.   HENT:      Head: Normocephalic and atraumatic.      Right Ear: Tympanic membrane normal.      Left Ear: Tympanic membrane normal.      Nose: Nose normal.      Mouth/Throat:      Mouth: Mucous membranes are moist.   Eyes:      Extraocular Movements: Extraocular movements intact.      Pupils: Pupils are equal, round, and reactive to light.   Cardiovascular:      Rate and Rhythm: Normal rate and regular rhythm.   Pulmonary:      Effort: Pulmonary effort is normal.      Breath sounds: Normal breath sounds.   Abdominal:      General: There is no distension.      Palpations: Abdomen is soft. There is no mass.      Tenderness: There is abdominal tenderness (suprapubic tenderness). There is no right CVA  tenderness, left CVA tenderness, guarding or rebound.   Musculoskeletal:         General: Tenderness (L flank pain) present. Normal range of motion.      Cervical back: Normal range of motion and neck supple.   Skin:     General: Skin is warm.      Capillary Refill: Capillary refill takes less than 2 seconds.   Neurological:      General: No focal deficit present.      Mental Status: She is alert.         Laboratory  Recent Labs     04/01/25  1629   WBC 7.0   NEUTSPOLYS 61.60   LYMPHOCYTES 30.20   MONOCYTES 6.30   EOSINOPHILS 0.90   BASOPHILS 0.60   ASTSGOT 21   ALTSGPT 20   ALKPHOSPHAT 85   TBILIRUBIN 0.5     Recent Labs     04/01/25  1629   SODIUM 141   POTASSIUM 4.1   CHLORIDE 104   CO2 24   BUN 10   CREATININE 0.81   MAGNESIUM 1.7   CALCIUM 9.8     Recent Labs     04/01/25  1629   ALTSGPT 20   ASTSGOT 21   ALKPHOSPHAT 85   TBILIRUBIN 0.5   GLUCOSE 88           Imaging  CT-RENAL COLIC EVALUATION(A/P W/O)   Final Result         1.  Tiny nonobstructing left renal stone. No obstructive uropathy.   2.  Status post right nephrectomy, cholecystectomy, hysterectomy and probable appendectomy.   3.  No acute inflammatory abnormality.                   Assessment and Plan  * Flank pain- (present on admission)  Assessment & Plan  Concern for pyelonephritis. Currently afebrile, no white count, non septic appearing and no bacteria in urine but does have nitrites. Ddx early infection, recent abx use, non-nitrite producing bacteria such as enterococcus. Per patient has had frequent UTIs in the past most recent in 2024, per chart review presented to urology [place] with dysuria and suprapubic tenderness as well, found to have small 1-2mm nonobstructing left ureteral stone however UA clean with usual urogenital kelton and was treated with rocephin and given 3 month scourse of daily ppx single strain bactrim by her PCP.   X2 positive E coli cultures since 5/2022. Given complex medical history including recurrent UTIs, kidney stones  and single kidney as well as hx of urosepsis in the past will need admission for antibiotics and closer monitoring.   - follow up urine culture  - continue ceftriaxone  - pain control with tylenol prn and morphine for breakthrough  - encourage PO hydration    Type 2 diabetes mellitus (HCC)  Assessment & Plan  A1c 5.3 6/2024, on metformin  - continue metformin 2000mg daily    GERD (gastroesophageal reflux disease)- (present on admission)  Assessment & Plan  - continue esomeprazole 20mg daily    Obesity (BMI 30-39.9)- (present on admission)  Assessment & Plan  BMI 33.46  - Discussed with the patient regarding diet, exercise, and lifestyle modification to help achieve and maintain healthy weight     Hyperlipidemia- (present on admission)  Assessment & Plan  #HLD  - continue atorvastatin 80mg daily    Hypertension- (present on admission)  Assessment & Plan  - continue lisinopril 10mg daily, metoprolol SR 25mg daily        - Vte: lovenox  - PPI/H2: PPI  - Pain: tylenol, morphine  - Lines: PIV  - William: not indicated  - Dispo: inpatient    Discussed with attending physician, Dr. Aakash Lawson.    Sandra Reyes  PG-2 Internal Medicine

## 2025-04-02 NOTE — ED NOTES
Patient is still having pain after pain medication. ERP updated and new orders placed.   Pain increased after straight cath in bladder

## 2025-04-02 NOTE — PROGRESS NOTES
[facility] Medicine Daily Progress Note    Date of Service  4/2/2025    Chief Complaint  Leslie Nazario is a 50 y.o. female admitted 4/1/2025 with flank pain    Hospital Course  No notes on file    Interval Problem Update  Patient was seen and examined at bedside.  No acute events overnight. Patient is resting comfortably in bed and in no acute distress.     Flank pain and positive UA  Await urine culture from 03/31  IV rocephin  Anticipate discharge in next 24 hours    I have discussed this patient's plan of care and discharge plan at IDT rounds today with Case Management, Nursing, Nursing leadership, and other members of the IDT team.    Code Status  Full Code    Disposition  The patient is not medically cleared for discharge to home or a post-acute facility.      I have placed the appropriate orders for post-discharge needs.    Review of Systems  Review of Systems   Constitutional:  Positive for malaise/fatigue.   HENT:  Negative for congestion.    Eyes:  Negative for blurred vision and double vision.   Respiratory:  Negative for cough and shortness of breath.    Cardiovascular:  Negative for chest pain and palpitations.   Gastrointestinal:  Negative for nausea and vomiting.   Genitourinary:  Positive for dysuria and flank pain.   Musculoskeletal:  Negative for falls.   Neurological:  Negative for headaches.        Physical Exam  Temp:  [35.9 °C (96.7 °F)-36.6 °C (97.8 °F)] 35.9 °C (96.7 °F)  Pulse:  [68-97] 68  Resp:  [16-18] 16  BP: (110-161)/() 110/71  SpO2:  [90 %-100 %] 95 %    Physical Exam  Vitals reviewed.   Constitutional:       General: She is not in acute distress.     Appearance: She is obese.      Comments: Pleasant, conversational   HENT:      Head: Normocephalic.      Right Ear: External ear normal.      Left Ear: External ear normal.      Nose: No congestion.      Mouth/Throat:      Pharynx: No oropharyngeal exudate.   Eyes:      General: No scleral icterus.  Cardiovascular:      Rate  and Rhythm: Normal rate.      Heart sounds: No murmur heard.  Pulmonary:      Breath sounds: No wheezing.   Abdominal:      Tenderness: There is no abdominal tenderness. There is left CVA tenderness. There is no guarding.   Musculoskeletal:         General: No swelling or deformity.   Skin:     Coloration: Skin is not jaundiced.      Findings: No bruising.   Neurological:      General: No focal deficit present.      Mental Status: She is alert.      Motor: No weakness.   Psychiatric:         Mood and Affect: Mood normal.         Behavior: Behavior normal.         Fluids    Intake/Output Summary (Last 24 hours) at 4/2/2025 1248  Last data filed at 4/2/2025 0930  Gross per 24 hour   Intake 360 ml   Output --   Net 360 ml        Laboratory  Recent Labs     04/01/25  1629 04/02/25  0600   WBC 7.0 6.4   RBC 4.73 4.17*   HEMOGLOBIN 14.1 12.4   HEMATOCRIT 42.2 37.8   MCV 89.2 90.6   MCH 29.8 29.7   MCHC 33.4 32.8   RDW 42.6 43.1   PLATELETCT 333 292   MPV 8.9* 9.2     Recent Labs     04/01/25  1629 04/02/25  0600   SODIUM 141 141   POTASSIUM 4.1 3.8   CHLORIDE 104 106   CO2 24 24   GLUCOSE 88 102*   BUN 10 11   CREATININE 0.81 0.77   CALCIUM 9.8 9.1                   Imaging  CT-RENAL COLIC EVALUATION(A/P W/O)   Final Result         1.  Tiny nonobstructing left renal stone. No obstructive uropathy.   2.  Status post right nephrectomy, cholecystectomy, hysterectomy and probable appendectomy.   3.  No acute inflammatory abnormality.                    Assessment/Plan  * Pyelonephritis- (present on admission)  Assessment & Plan  Pyelonephritis by clinical exam  Left flank pain   CT reassuring with no acute inflammatory abnormality.   WBC 6.4  Blood culture negative x1 day  Urine culture pending  Continue IV rocephin    Type 2 diabetes mellitus (HCC)  Assessment & Plan  A1c 5.3 6/2024, on metformin  - continue metformin 2000mg daily    GERD (gastroesophageal reflux disease)- (present on admission)  Assessment & Plan  -  continue esomeprazole 20mg daily    Obesity (BMI 30-39.9)- (present on admission)  Assessment & Plan  BMI 33.46  - Discussed with the patient regarding diet, exercise, and lifestyle modification to help achieve and maintain healthy weight     Hyperlipidemia- (present on admission)  Assessment & Plan  #HLD  - continue atorvastatin 80mg daily    Hypertension- (present on admission)  Assessment & Plan  - continue lisinopril 10mg daily, metoprolol SR 25mg daily         VTE prophylaxis:    enoxaparin ppx      I have performed a physical exam and reviewed and updated ROS and Plan today (4/2/2025). In review of yesterday's note (4/1/2025), there are no changes except as documented above.    Greater than 51 minutes spent prepping to see patient (e.g. review of tests) obtaining and/or reviewing separately obtained history. Performing a medically appropriate examination and/ evaluation.  Counseling and educating the patient/family/caregiver.  Ordering medications, tests, or procedures.  Referring and communicating with other health care professionals.  Documenting clinical information in EPIC.  Independently interpreting results and communicating results to patient/family/caregiver.  Care coordination.

## 2025-04-02 NOTE — ASSESSMENT & PLAN NOTE
BMI 33.46  - Discussed with the patient regarding diet, exercise, and lifestyle modification to help achieve and maintain healthy weight

## 2025-04-02 NOTE — ED NOTES
Assumed care at this time. Pt is awake on bed. No fresh complaints at this time.  For admission waiting for transport.

## 2025-04-02 NOTE — PROGRESS NOTES
4 Eyes Skin Assessment Completed by GRAZYNA Mann and GRAZYNA Hampton.    Head WDL  Ears WDL  Nose WDL  Mouth WDL  Neck WDL  Breast/Chest WDL  Shoulder Blades WDL  Spine WDL  (R) Arm/Elbow/Hand WDL  (L) Arm/Elbow/Hand WDL  Abdomen WDL  Groin WDL  Scrotum/Coccyx/Buttocks WDL  (R) Leg WDL  (L) Leg WDL  (R) Heel/Foot/Toe WDL  (L) Heel/Foot/Toe WDL          Devices In Places None      Interventions In Place N/A    Possible Skin Injury No    Pictures Uploaded Into Epic N/A  Wound Consult Placed N/A  RN Wound Prevention Protocol Ordered No

## 2025-04-02 NOTE — ASSESSMENT & PLAN NOTE
Pyelonephritis by clinical exam  Left flank pain   CT reassuring with no acute inflammatory abnormality.   WBC 6.4  Blood culture negative x1 day  Urine culture pending  Continue IV rocephin

## 2025-04-03 ENCOUNTER — PHARMACY VISIT (OUTPATIENT)
Dept: PHARMACY | Facility: MEDICAL CENTER | Age: 50
End: 2025-04-03
Payer: COMMERCIAL

## 2025-04-03 VITALS
BODY MASS INDEX: 33.53 KG/M2 | DIASTOLIC BLOOD PRESSURE: 77 MMHG | WEIGHT: 213.63 LBS | OXYGEN SATURATION: 96 % | HEART RATE: 67 BPM | TEMPERATURE: 96.8 F | SYSTOLIC BLOOD PRESSURE: 132 MMHG | RESPIRATION RATE: 17 BRPM | HEIGHT: 67 IN

## 2025-04-03 LAB
ALBUMIN SERPL BCP-MCNC: 3.6 G/DL (ref 3.2–4.9)
BACTERIA UR CULT: NORMAL
BACTERIA UR CULT: NORMAL
BUN SERPL-MCNC: 10 MG/DL (ref 8–22)
CALCIUM ALBUM COR SERPL-MCNC: 9.5 MG/DL (ref 8.5–10.5)
CALCIUM SERPL-MCNC: 9.2 MG/DL (ref 8.5–10.5)
CHLORIDE SERPL-SCNC: 108 MMOL/L (ref 96–112)
CO2 SERPL-SCNC: 24 MMOL/L (ref 20–33)
CREAT SERPL-MCNC: 0.67 MG/DL (ref 0.5–1.4)
ERYTHROCYTE [DISTWIDTH] IN BLOOD BY AUTOMATED COUNT: 41.7 FL (ref 35.9–50)
GFR SERPLBLD CREATININE-BSD FMLA CKD-EPI: 106 ML/MIN/1.73 M 2
GLUCOSE SERPL-MCNC: 111 MG/DL (ref 65–99)
HCT VFR BLD AUTO: 37.8 % (ref 37–47)
HGB BLD-MCNC: 12.8 G/DL (ref 12–16)
MAGNESIUM SERPL-MCNC: 1.9 MG/DL (ref 1.5–2.5)
MCH RBC QN AUTO: 30.1 PG (ref 27–33)
MCHC RBC AUTO-ENTMCNC: 33.9 G/DL (ref 32.2–35.5)
MCV RBC AUTO: 88.9 FL (ref 81.4–97.8)
PHOSPHATE SERPL-MCNC: 3.1 MG/DL (ref 2.5–4.5)
PLATELET # BLD AUTO: 277 K/UL (ref 164–446)
PMV BLD AUTO: 8.9 FL (ref 9–12.9)
POTASSIUM SERPL-SCNC: 4 MMOL/L (ref 3.6–5.5)
RBC # BLD AUTO: 4.25 M/UL (ref 4.2–5.4)
SIGNIFICANT IND 70042: NORMAL
SIGNIFICANT IND 70042: NORMAL
SITE SITE: NORMAL
SITE SITE: NORMAL
SODIUM SERPL-SCNC: 141 MMOL/L (ref 135–145)
SOURCE SOURCE: NORMAL
SOURCE SOURCE: NORMAL
WBC # BLD AUTO: 6.4 K/UL (ref 4.8–10.8)

## 2025-04-03 PROCEDURE — 85027 COMPLETE CBC AUTOMATED: CPT

## 2025-04-03 PROCEDURE — 99239 HOSP IP/OBS DSCHRG MGMT >30: CPT | Performed by: INTERNAL MEDICINE

## 2025-04-03 PROCEDURE — 80069 RENAL FUNCTION PANEL: CPT

## 2025-04-03 PROCEDURE — 700102 HCHG RX REV CODE 250 W/ 637 OVERRIDE(OP)

## 2025-04-03 PROCEDURE — 83735 ASSAY OF MAGNESIUM: CPT

## 2025-04-03 PROCEDURE — A9270 NON-COVERED ITEM OR SERVICE: HCPCS

## 2025-04-03 PROCEDURE — RXMED WILLOW AMBULATORY MEDICATION CHARGE: Performed by: INTERNAL MEDICINE

## 2025-04-03 RX ORDER — SULFAMETHOXAZOLE AND TRIMETHOPRIM 800; 160 MG/1; MG/1
1 TABLET ORAL 2 TIMES DAILY
Qty: 10 TABLET | Refills: 0 | Status: ACTIVE | OUTPATIENT
Start: 2025-04-03 | End: 2025-04-08

## 2025-04-03 RX ADMIN — ACETAMINOPHEN 650 MG: 325 TABLET ORAL at 08:45

## 2025-04-03 RX ADMIN — LISINOPRIL 10 MG: 10 TABLET ORAL at 05:27

## 2025-04-03 RX ADMIN — OMEPRAZOLE 20 MG: 20 CAPSULE, DELAYED RELEASE ORAL at 08:45

## 2025-04-03 RX ADMIN — METOPROLOL SUCCINATE 25 MG: 25 TABLET, EXTENDED RELEASE ORAL at 05:27

## 2025-04-03 ASSESSMENT — PAIN DESCRIPTION - PAIN TYPE
TYPE: ACUTE PAIN

## 2025-04-03 NOTE — PROGRESS NOTES
Report received from GRAZYNA Barker & assumed care at 0040. Patient sleeping with even & unlabored breathing. Call light within reach. Bed locked in lowest position.

## 2025-04-03 NOTE — PROGRESS NOTES
Patient left unit for discharge lounge. Patient left by wheelchair. All personal belongings gathered. Report given to transport.

## 2025-04-03 NOTE — CARE PLAN
The patient is Stable - Low risk of patient condition declining or worsening    Shift Goals  Clinical Goals: patient's pain will be controlled to comfort level; patient uo will be adequate throughout the shift  Patient Goals: sleep  Family Goals: JOHN    Progress made toward(s) clinical / shift goals:        Problem: Pain - Standard  Goal: Alleviation of pain or a reduction in pain to the patient’s comfort goal  Outcome: Progressing  Note: Patient not reporting pain at beginning of shift. Patient sleeping comfortably with even & unlabored breathing during middle of shift.     Problem: Urinary Elimination:  Goal: Ability to reestablish a normal urinary elimination pattern will improve  Outcome: Progressing  Note: Patient voiding during the shift.        Patient is not progressing towards the following goals:

## 2025-04-03 NOTE — PROGRESS NOTES
Assumed care of patient at 2107. Received bedside report from GRAZYNA Strauss. Patient A&Ox4, on RA, Reporting a pain level of 0/10. Call light within reach, belongings within reach, fall precautions in place, bed in lowest position. Patient does not have any other needs at this time.     POC was discussed with patient. All questions were answered. Patient verbalized understanding.

## 2025-04-03 NOTE — DISCHARGE SUMMARY
Discharge Summary    CHIEF COMPLAINT ON ADMISSION  Chief Complaint   Patient presents with    Flank Pain     Pt having left sided flank pain 1x day. Pt has only one kidney. Pt was treated for a uti at  yesterday and instructed to immediately come to the ER if she began experiencing any flank pain.        Reason for Admission  Flank Pain/Sent by      Admission Date  4/1/2025    CODE STATUS  Prior    HPI & HOSPITAL COURSE  Patient is a 50-year-old female with past medical history of right-sided nephrectomy due to renal stones complicated by recurrent urinary tract infections who presents due to left-sided flank pain found to have nonobstructing nephrolithiasis as well as urinalysis positive for UTI. Started on IV antibiotics. No leukocytosis. Blood culture, urine culture negative x2 days. Patient improved clinically and is agreeable to discharge. Given UA concerning for UTI and flank pain in setting of solitary kidney will treat for pyelonephritis using previous culture data. Patient was determined satisfactory for discharge with appropriate follow up.    Therefore, she is discharged in fair and stable condition to home with close outpatient follow-up.    The patient met 2-midnight criteria for an inpatient stay at the time of discharge.    Discharge Date  4/3/2025    FOLLOW UP ITEMS POST DISCHARGE  Please follow up with PCP in 3-5 days for post hospitalization follow up and medication reconciliation.     DISCHARGE DIAGNOSES  Principal Problem:    Pyelonephritis (POA: Yes)  Active Problems:    Hypertension (POA: Yes)      Overview: ICD-10 transition    Hyperlipidemia (POA: Yes)    Obesity (BMI 30-39.9) (POA: Yes)    GERD (gastroesophageal reflux disease) (Chronic) (POA: Yes)    Type 2 diabetes mellitus (HCC) (POA: Unknown)  Resolved Problems:    * No resolved hospital problems. *      FOLLOW UP  No future appointments.  Flavio Junior M.D.  [address] [facility] [address]  [phone]    Schedule an  appointment as soon as possible for a visit in 2 week(s)        MEDICATIONS ON DISCHARGE     Medication List        CONTINUE taking these medications        Instructions   aspirin 81 MG Chew chewable tablet  Commonly known as: Aspirin Low Dose   CHEW AND SWALLOW 1 TABLET ORALLY EVERY DAY     atorvastatin 80 MG tablet  Commonly known as: Lipitor   Take 1 Tablet by mouth every evening.  Dose: 80 mg     esomeprazole 20 MG capsule  Commonly known as: NexIUM   Take 1 Capsule by mouth every morning before breakfast.  Dose: 20 mg     lisinopril 10 MG Tabs  Commonly known as: Prinivil   TAKE 1 TABLET BY MOUTH EVERY DAY  Dose: 10 mg     metFORMIN  MG Tb24  Commonly known as: Glucophage XR   Take 1,000 mg by mouth 2 times a day. 1,000 mg = 2 tabs  Dose: 1,000 mg     metoprolol SR 25 MG Tb24  Commonly known as: Toprol XL   TAKE 1 TABLET BY MOUTH EVERY DAY  Dose: 25 mg     phenazopyridine 200 MG Tabs  Commonly known as: Pyridium   Take 1 Tablet by mouth 3 times a day as needed for Severe Pain.  Dose: 200 mg     phentermine 37.5 MG tablet  Commonly known as: Adipex-P   Take 37.5 mg by mouth every morning.  Dose: 37.5 mg     sulfamethoxazole-trimethoprim 800-160 MG tablet  Commonly known as: Bactrim DS   Take 1 Tablet by mouth 2 times a day for 5 days.  Dose: 1 Tablet              Allergies  No Known Allergies    DIET  No orders of the defined types were placed in this encounter.      LABORATORY  Lab Results   Component Value Date    SODIUM 141 04/03/2025    POTASSIUM 4.0 04/03/2025    CHLORIDE 108 04/03/2025    CO2 24 04/03/2025    GLUCOSE 111 (H) 04/03/2025    BUN 10 04/03/2025    CREATININE 0.67 04/03/2025    CREATININE 0.8 04/17/2009        Lab Results   Component Value Date    WBC 6.4 04/03/2025    HEMOGLOBIN 12.8 04/03/2025    HEMATOCRIT 37.8 04/03/2025    PLATELETCT 277 04/03/2025        Total time of the discharge process exceeds 37 minutes.

## 2025-04-03 NOTE — HOSPITAL COURSE
Patient is a 50-year-old female with past medical history of right-sided nephrectomy due to renal stones complicated by recurrent urinary tract infections who presents due to left-sided flank pain found to have nonobstructing nephrolithiasis as well as urinalysis positive for UTI. Started on IV antibiotics. No leukocytosis. Blood culture, urine culture negative x2 days. Patient improved clinically and is agreeable to discharge. Given UA concerning for UTI and flank pain in setting of solitary kidney will treat for pyelonephritis using previous culture data. Patient was determined satisfactory for discharge with appropriate follow up.

## 2025-04-03 NOTE — PROGRESS NOTES
Patient report received and assumed care. Assessed patient at 0745. Patient is Aox4 and reports no pain. Patient report burning when urinating. Patient is up self. Patient is on a regular diet. Patient bed is in low, locked position. Standard fall precautions are in place. Personal belonging and call light are within reach. Patient reinforced to use call light when needed.

## 2025-04-07 ENCOUNTER — RESULTS FOLLOW-UP (OUTPATIENT)
Dept: MEDICAL GROUP | Facility: PHYSICIAN GROUP | Age: 50
End: 2025-04-07

## 2025-04-07 RX ORDER — ATORVASTATIN CALCIUM 80 MG/1
80 TABLET, FILM COATED ORAL EVERY EVENING
Qty: 90 TABLET | Refills: 0 | Status: SHIPPED | OUTPATIENT
Start: 2025-04-07

## 2025-05-16 DIAGNOSIS — I10 ESSENTIAL HYPERTENSION: ICD-10-CM

## 2025-05-16 NOTE — TELEPHONE ENCOUNTER
Received request via: Pharmacy    Was the patient seen in the last year in this department? Yes    Does the patient have an active prescription (recently filled or refills available) for medication(s) requested? No    Pharmacy Name: [facility]/pharmacy #[id number] - [place], [place] - [address] [place] [facility].      Does the patient have [facility]FP Plus and need 100-day supply? (This applies to ALL medications) Patient does not have SCP

## 2025-05-18 RX ORDER — LISINOPRIL 10 MG/1
10 TABLET ORAL DAILY
Qty: 60 TABLET | Refills: 0 | Status: SHIPPED | OUTPATIENT
Start: 2025-05-18

## 2025-07-02 RX ORDER — ATORVASTATIN CALCIUM 80 MG/1
80 TABLET, FILM COATED ORAL EVERY EVENING
Qty: 90 TABLET | Refills: 0 | Status: SHIPPED | OUTPATIENT
Start: 2025-07-02 | End: 2025-07-03

## 2025-07-02 NOTE — TELEPHONE ENCOUNTER
Received request via: Pharmacy    Was the patient seen in the last year in this department? Yes    Does the patient have an active prescription (recently filled or refills available) for medication(s) requested? No    Pharmacy Name: [id number] - [place], [place] - [address] [facility]     Does the patient have USP Plus and need 100-day supply? (This applies to ALL medications) Patient does not have SCP

## 2025-07-03 ENCOUNTER — OFFICE VISIT (OUTPATIENT)
Dept: MEDICAL GROUP | Facility: PHYSICIAN GROUP | Age: 50
End: 2025-07-03
Payer: COMMERCIAL

## 2025-07-03 VITALS
HEIGHT: 67 IN | BODY MASS INDEX: 32.96 KG/M2 | DIASTOLIC BLOOD PRESSURE: 78 MMHG | SYSTOLIC BLOOD PRESSURE: 126 MMHG | WEIGHT: 210 LBS | OXYGEN SATURATION: 99 % | HEART RATE: 76 BPM | TEMPERATURE: 97.7 F | RESPIRATION RATE: 16 BRPM

## 2025-07-03 DIAGNOSIS — E78.5 TYPE 2 DIABETES MELLITUS WITH HYPERLIPIDEMIA (HCC): ICD-10-CM

## 2025-07-03 DIAGNOSIS — I10 ESSENTIAL HYPERTENSION: Primary | ICD-10-CM

## 2025-07-03 DIAGNOSIS — I10 PRIMARY HYPERTENSION: Chronic | ICD-10-CM

## 2025-07-03 DIAGNOSIS — Z12.31 ENCOUNTER FOR SCREENING MAMMOGRAM FOR MALIGNANT NEOPLASM OF BREAST: ICD-10-CM

## 2025-07-03 DIAGNOSIS — E78.5 HYPERLIPIDEMIA, UNSPECIFIED HYPERLIPIDEMIA TYPE: ICD-10-CM

## 2025-07-03 DIAGNOSIS — K21.9 GASTROESOPHAGEAL REFLUX DISEASE WITHOUT ESOPHAGITIS: Chronic | ICD-10-CM

## 2025-07-03 DIAGNOSIS — E11.69 TYPE 2 DIABETES MELLITUS WITH HYPERLIPIDEMIA (HCC): ICD-10-CM

## 2025-07-03 PROBLEM — Z12.39 BREAST CANCER SCREENING: Status: ACTIVE | Noted: 2025-07-03

## 2025-07-03 PROBLEM — R73.03 PREDIABETES: Chronic | Status: RESOLVED | Noted: 2024-03-24 | Resolved: 2025-07-03

## 2025-07-03 LAB
HBA1C MFR BLD: 5 % (ref ?–5.8)
POCT INT CON NEG: NEGATIVE
POCT INT CON POS: POSITIVE

## 2025-07-03 PROCEDURE — 83036 HEMOGLOBIN GLYCOSYLATED A1C: CPT | Performed by: INTERNAL MEDICINE

## 2025-07-03 PROCEDURE — 3074F SYST BP LT 130 MM HG: CPT | Performed by: INTERNAL MEDICINE

## 2025-07-03 PROCEDURE — 3078F DIAST BP <80 MM HG: CPT | Performed by: INTERNAL MEDICINE

## 2025-07-03 PROCEDURE — 92250 FUNDUS PHOTOGRAPHY W/I&R: CPT | Mod: 26 | Performed by: INTERNAL MEDICINE

## 2025-07-03 PROCEDURE — 99214 OFFICE O/P EST MOD 30 MIN: CPT | Performed by: INTERNAL MEDICINE

## 2025-07-03 RX ORDER — LISINOPRIL 10 MG/1
10 TABLET ORAL DAILY
Qty: 90 TABLET | Refills: 3 | Status: SHIPPED | OUTPATIENT
Start: 2025-07-03

## 2025-07-03 RX ORDER — ATORVASTATIN CALCIUM 80 MG/1
80 TABLET, FILM COATED ORAL EVERY EVENING
Qty: 90 TABLET | Refills: 3 | Status: SHIPPED | OUTPATIENT
Start: 2025-07-03

## 2025-07-03 RX ORDER — METOPROLOL SUCCINATE 25 MG/1
25 TABLET, EXTENDED RELEASE ORAL DAILY
Qty: 90 TABLET | Refills: 3 | Status: SHIPPED | OUTPATIENT
Start: 2025-07-03

## 2025-07-03 ASSESSMENT — FIBROSIS 4 INDEX: FIB4 SCORE: 0.72

## 2025-07-04 NOTE — ASSESSMENT & PLAN NOTE
Chronic condition.  Patient presently followed by private endocrinologist.  Patient is presently taking metformin 1000 Mg twice daily.  Patient denies significant side effect.  A1c in the office today 5%.

## 2025-07-04 NOTE — ASSESSMENT & PLAN NOTE
Chronic condition.  Patient is taking Nexium.  Patient followed by GI specialist.  The patient denies nausea vomiting or dysphagia.

## 2025-07-04 NOTE — ASSESSMENT & PLAN NOTE
Chronic condition.  The patient is taking lisinopril 10 mg daily.  Patient is requesting refills.  Patient denies chest pain shortness of breath or palpitation.

## 2025-07-04 NOTE — PROGRESS NOTES
PRIMARY CARE CLINIC VISIT        Chief Complaint   Patient presents with    Medication Management    Medication Refill      Follow-up diabetes  Rx refill  Follow-up hypertension  Hyperlipidemia  Acid reflux  Request mammogram and lab test        History of Present Illness     Type 2 diabetes mellitus with hyperlipidemia (HCC)  Chronic condition.  Patient presently followed by private endocrinologist.  Patient is presently taking metformin 1000 Mg twice daily.  Patient denies significant side effect.  A1c in the office today 5%.    Hypertension  Chronic condition.  The patient is taking lisinopril 10 mg daily.  Patient is requesting refills.  Patient denies chest pain shortness of breath or palpitation.    Hyperlipidemia  Chronic condition.  The patient is taking atorvastatin.  Patient is due for lab test and Rx refill.  Patient currently asymptomatic.    GERD (gastroesophageal reflux disease)  Chronic condition.  Patient is taking Nexium.  Patient followed by GI specialist.  The patient denies nausea vomiting or dysphagia.    Breast cancer screening  Patient is due for mammogram.  Order submitted today.    Medications Ordered Prior to Encounter[1]     Allergies: Patient has no known allergies.    Current Medications and Prescriptions Ordered in Epic[2]    Past Medical History[3]    Past Surgical History[4]    Family History   Problem Relation Age of Onset    Hypertension Mother     Hypertension Father     Hypertension Brother     Genitourinary () Problems Brother         kidney stones       Tobacco Use History[5]    Social History     Substance and Sexual Activity   Alcohol Use Not Currently    Alcohol/week: 0.0 oz    Comment: 2 per month       Review of systems  As per HPI above. All other systems reviewed and negative.      Past Medical, Social, and Family history reviewed and updated in [facility]       LAB DATA:     I have independently reviewed / interpreted labs    Lab Results   Component Value Date/Time    HBA1C  "5.0 07/03/2025 05:03 PM    HBA1C 5.3 06/28/2024 03:42 PM    HBA1C 5.2 02/15/2023 02:43 PM        Lab Results   Component Value Date/Time    WBC 6.4 04/03/2025 05:01 AM    HEMOGLOBIN 12.8 04/03/2025 05:01 AM    HEMATOCRIT 37.8 04/03/2025 05:01 AM    MCV 88.9 04/03/2025 05:01 AM    PLATELETCT 277 04/03/2025 05:01 AM       Lab Results   Component Value Date/Time    SODIUM 141 04/03/2025 05:01 AM    POTASSIUM 4.0 04/03/2025 05:01 AM    GLUCOSE 111 (H) 04/03/2025 05:01 AM    BUN 10 04/03/2025 05:01 AM    CREATININE 0.67 04/03/2025 05:01 AM    CREATININE 0.8 04/17/2009 03:38 PM       Lab Results   Component Value Date/Time    CHOLSTRLTOT 175 03/23/2024 09:04 AM    TRIGLYCERIDE 121 03/23/2024 09:04 AM    HDL 47 03/23/2024 09:04 AM     (H) 03/23/2024 09:04 AM       Lab Results   Component Value Date/Time    ALTSGPT 14 04/02/2025 06:00 AM          Objective     /78 (BP Location: Right arm, Patient Position: Sitting, BP Cuff Size: Adult)   Pulse 76   Temp 36.5 °C (97.7 °F) (Temporal)   Resp 16   Ht 1.702 m (5' 7\")   Wt 95.3 kg (210 lb)   SpO2 99%    Body mass index is 32.89 kg/m².    General: alert in no apparent distress.  Cardiovascular: regular rate and rhythm  Pulmonary: lungs : no wheezing   Gastrointestinal: BS present.       Assessment and Plan     1. Type 2 diabetes mellitus with hyperlipidemia (HCC)  Chronic condition.  Excellent control.  Patient to continue follow-up with endocrinologist as directed.  Continue metformin.  Clinical notes:   -Discussed with the patient goals for Diabetes management:   HbA1C < 7% /  Fasting BG   /  2 hrs post meal BG below 160  -Reviewed pathophysiology of diabetes and the importance of glycemic control to reduce the risk of diabetes related complications   Microvascular: retinopathy, neuropathy, nephropathy  Macrovascular: heart attack, stroke, peripheral vascular dz  -Advised pt to monitor sugar closely  -Counseled pt the importance of recognizing and " treating hypoglycemia.   -The importance of increasing physical activity to improve diabetes control  discussed with the patient.   -Patient was also educated on changing diet and making better choices to help control blood sugar.          - POCT A1C  - Diabetic Monofilament LE Exam  - POCT Retinal Eye Exam  - Basic Metabolic Panel; Future    2. Essential hypertension  Stable.  Continue lisinopril 10 mg daily.  BP normal today.    - lisinopril (PRINIVIL) 10 MG Tab; Take 1 Tablet by mouth every day.  Dispense: 90 Tablet; Refill: 3  - metoprolol SR (TOPROL XL) 25 MG TABLET SR 24 HR; Take 1 Tablet by mouth every day.  Dispense: 90 Tablet; Refill: 3  - CBC WITH DIFFERENTIAL; Future  - TSH WITH REFLEX TO FT4; Future  - MICROALBUMIN CREAT RATIO URINE; Future    3. Hyperlipidemia, unspecified hyperlipidemia type  - atorvastatin (LIPITOR) 80 MG tablet; Take 1 Tablet by mouth every evening.  Dispense: 90 Tablet; Refill: 3  - ALANINE AMINO-TRANS; Future  - Lipid Profile; Future  Chronic condition.  Current status unclear.  Lab test ordered to check lipid panel.  Continue atorvastatin 80 Mg daily    4. Gastroesophageal reflux disease without esophagitis  Stable.  Continue Nexium 20 Mg daily    5. Encounter for screening mammogram for malignant neoplasm of breast  - MA-SCREENING MAMMO BILAT W/TOMOSYNTHESIS W/CAD; Future        Follow-up 6 months        Please note that this dictation was created using voice recognition software. I have made every reasonable attempt to correct obvious errors, but I expect that there are errors of grammar and possibly content that I did not discover before finalizing the note.    Flavio Junior MD  Internal Medicine  [facility]       [1]   Current Outpatient Medications on File Prior to Visit   Medication Sig Dispense Refill    metFORMIN ER (GLUCOPHAGE XR) 500 MG TABLET SR 24 HR Take 1,000 mg by mouth 2 times a day. 1,000 mg = 2 tabs      phentermine (ADIPEX-P) 37.5 MG tablet Take  37.5 mg by mouth every morning.      phenazopyridine (PYRIDIUM) 200 MG Tab Take 1 Tablet by mouth 3 times a day as needed for Severe Pain. 30 Tablet 0    esomeprazole (NEXIUM) 20 MG capsule Take 1 Capsule by mouth every morning before breakfast. 90 Capsule 3    aspirin (ASPIRIN LOW DOSE) 81 MG Chew Tab chewable tablet CHEW AND SWALLOW 1 TABLET ORALLY EVERY DAY (Patient not taking: Reported on 7/3/2025) 90 Tablet 0     No current facility-administered medications on file prior to visit.   [2]   Current Outpatient Medications Ordered in Epic   Medication Sig Dispense Refill    atorvastatin (LIPITOR) 80 MG tablet Take 1 Tablet by mouth every evening. 90 Tablet 3    lisinopril (PRINIVIL) 10 MG Tab Take 1 Tablet by mouth every day. 90 Tablet 3    metoprolol SR (TOPROL XL) 25 MG TABLET SR 24 HR Take 1 Tablet by mouth every day. 90 Tablet 3    metFORMIN ER (GLUCOPHAGE XR) 500 MG TABLET SR 24 HR Take 1,000 mg by mouth 2 times a day. 1,000 mg = 2 tabs      phentermine (ADIPEX-P) 37.5 MG tablet Take 37.5 mg by mouth every morning.      phenazopyridine (PYRIDIUM) 200 MG Tab Take 1 Tablet by mouth 3 times a day as needed for Severe Pain. 30 Tablet 0    esomeprazole (NEXIUM) 20 MG capsule Take 1 Capsule by mouth every morning before breakfast. 90 Capsule 3    aspirin (ASPIRIN LOW DOSE) 81 MG Chew Tab chewable tablet CHEW AND SWALLOW 1 TABLET ORALLY EVERY DAY (Patient not taking: Reported on 7/3/2025) 90 Tablet 0     No current Epic-ordered facility-administered medications on file.   [3]   Past Medical History:  Diagnosis Date    Depression     HTN     resolved 8/10    Pain 01-24-14    abd., 0/10    Pain     right hip    Renal disorder     right nephrectomy 2010    S/p nephrectomy 2010    right removed   [4]   Past Surgical History:  Procedure Laterality Date    HIP ARTHROSCOPY  12/18/2014    Performed by Benji Lott M.D. at [facility]    FEMORAL NECK OSTEOPLASTY  12/18/2014    Performed by Benji YOUNGBLOOD  "GARY Lott at SURGERY [facility]    ACETABULAR OSTEOPLASTY  12/18/2014    Performed by Benji Lott M.D. at SURGERY [facility]    SYNOVECTOMY  12/18/2014    Performed by Benji Lott M.D. at [facility]    LAPAROSCOPY  1/27/2014    Performed by Ihsan Dior M.D. at SURGERY [facility]    LYSIS ADHESIONS GENERAL  1/27/2014    Performed by Ihsan Dior M.D. at SURGERY [facility]    CASSANDRA BY LAPAROSCOPY  12/11/2013    Performed by Ihsan Dior M.D. at SURGERY [facility]    CHOLECYSTECTOMY  2013    NEPHRECTOMY LAPAROSCOPIC  2010    Right- Performed by NINA CORREA at SURGERY [facility]    SC NEPHRECTOMY  2010    CYSTOSCOPY STENT PLACEMENT  8/11/2009    Performed by KIMBERLY PALACIOS at SURGERY [facility]    URETEROSCOPY  8/11/2009    Performed by KIMBERLY PALACIOS at SURGERY [facility]    STONE RETRIEVAL  8/11/2009    Performed by KIMBERLY PALACIOS at SURGERY [facility]    STENT REMOVAL  8/11/2009    Performed by KIMBERLY PALACIOS at SURGERY [facility]    LAPAROTOMY  2009    bladder and ureter reconstruction    URETERAL REIMPLANTATION  4/23/08    Performed by KIMBERLY PALACIOS at SURGERY [facility]    STENT PLACEMENT  4/23/08    Performed by KIMBERLY PALACIOS at SURGERY [facility]    LYSIS ADHESIONS GENERAL  5/06    ABDOMINAL HYSTERECTOMY TOTAL  2005    Hysterectomy, Total Abdominal    LAPAROTOMY  2005    TONSILLECTOMY  2004    APPENDECTOMY  1991    IR-URETERAL STENT ANTEGRADE      R side    OTHER  [id number]    \"multiple procedures prior to nephrectom , stent placement/ nephrostomy tube     URETEROSCOPY      partial removal of ureter with reimplantation   [5]   Social History  Tobacco Use   Smoking Status Never   Smokeless Tobacco Never     "

## 2025-07-04 NOTE — ASSESSMENT & PLAN NOTE
Chronic condition.  The patient is taking atorvastatin.  Patient is due for lab test and Rx refill.  Patient currently asymptomatic.

## 2025-07-07 LAB — RETINAL SCREEN: NEGATIVE

## 2025-08-04 ENCOUNTER — HOSPITAL ENCOUNTER (OUTPATIENT)
Dept: LAB | Facility: MEDICAL CENTER | Age: 50
End: 2025-08-04
Attending: PLASTIC SURGERY
Payer: COMMERCIAL

## 2025-08-04 ENCOUNTER — HOSPITAL ENCOUNTER (OUTPATIENT)
Dept: LAB | Facility: MEDICAL CENTER | Age: 50
End: 2025-08-04
Attending: INTERNAL MEDICINE
Payer: COMMERCIAL

## 2025-08-04 DIAGNOSIS — I10 ESSENTIAL HYPERTENSION: ICD-10-CM

## 2025-08-04 DIAGNOSIS — E78.5 HYPERLIPIDEMIA, UNSPECIFIED HYPERLIPIDEMIA TYPE: ICD-10-CM

## 2025-08-04 DIAGNOSIS — E78.5 TYPE 2 DIABETES MELLITUS WITH HYPERLIPIDEMIA (HCC): ICD-10-CM

## 2025-08-04 DIAGNOSIS — E11.69 TYPE 2 DIABETES MELLITUS WITH HYPERLIPIDEMIA (HCC): ICD-10-CM

## 2025-08-04 LAB
ALBUMIN SERPL BCP-MCNC: 4.1 G/DL (ref 3.2–4.9)
ALBUMIN/GLOB SERPL: 1.6 G/DL
ALP SERPL-CCNC: 73 U/L (ref 30–99)
ALT SERPL-CCNC: 22 U/L (ref 2–50)
ALT SERPL-CCNC: 22 U/L (ref 2–50)
ANION GAP SERPL CALC-SCNC: 11 MMOL/L (ref 7–16)
ANION GAP SERPL CALC-SCNC: 13 MMOL/L (ref 7–16)
AST SERPL-CCNC: 21 U/L (ref 12–45)
BASOPHILS # BLD AUTO: 0.5 % (ref 0–1.8)
BASOPHILS # BLD AUTO: 0.6 % (ref 0–1.8)
BASOPHILS # BLD: 0.03 K/UL (ref 0–0.12)
BASOPHILS # BLD: 0.04 K/UL (ref 0–0.12)
BILIRUB SERPL-MCNC: 0.5 MG/DL (ref 0.1–1.5)
BUN SERPL-MCNC: 11 MG/DL (ref 8–22)
BUN SERPL-MCNC: 11 MG/DL (ref 8–22)
CALCIUM ALBUM COR SERPL-MCNC: 9.3 MG/DL (ref 8.5–10.5)
CALCIUM SERPL-MCNC: 9.4 MG/DL (ref 8.5–10.5)
CALCIUM SERPL-MCNC: 9.5 MG/DL (ref 8.5–10.5)
CHLORIDE SERPL-SCNC: 106 MMOL/L (ref 96–112)
CHLORIDE SERPL-SCNC: 107 MMOL/L (ref 96–112)
CHOLEST SERPL-MCNC: 158 MG/DL (ref 100–199)
CO2 SERPL-SCNC: 23 MMOL/L (ref 20–33)
CO2 SERPL-SCNC: 23 MMOL/L (ref 20–33)
CREAT SERPL-MCNC: 0.72 MG/DL (ref 0.5–1.4)
CREAT SERPL-MCNC: 0.76 MG/DL (ref 0.5–1.4)
CREAT UR-MCNC: 137 MG/DL
EOSINOPHIL # BLD AUTO: 0.09 K/UL (ref 0–0.51)
EOSINOPHIL # BLD AUTO: 0.11 K/UL (ref 0–0.51)
EOSINOPHIL NFR BLD: 1.4 % (ref 0–6.9)
EOSINOPHIL NFR BLD: 1.8 % (ref 0–6.9)
ERYTHROCYTE [DISTWIDTH] IN BLOOD BY AUTOMATED COUNT: 44.1 FL (ref 35.9–50)
ERYTHROCYTE [DISTWIDTH] IN BLOOD BY AUTOMATED COUNT: 44.5 FL (ref 35.9–50)
GFR SERPLBLD CREATININE-BSD FMLA CKD-EPI: 102 ML/MIN/1.73 M 2
GFR SERPLBLD CREATININE-BSD FMLA CKD-EPI: 95 ML/MIN/1.73 M 2
GLOBULIN SER CALC-MCNC: 2.6 G/DL (ref 1.9–3.5)
GLUCOSE SERPL-MCNC: 86 MG/DL (ref 65–99)
GLUCOSE SERPL-MCNC: 88 MG/DL (ref 65–99)
HCT VFR BLD AUTO: 40.7 % (ref 37–47)
HCT VFR BLD AUTO: 40.9 % (ref 37–47)
HDLC SERPL-MCNC: 41 MG/DL
HGB BLD-MCNC: 13.7 G/DL (ref 12–16)
HGB BLD-MCNC: 13.7 G/DL (ref 12–16)
IMM GRANULOCYTES # BLD AUTO: 0.02 K/UL (ref 0–0.11)
IMM GRANULOCYTES # BLD AUTO: 0.02 K/UL (ref 0–0.11)
IMM GRANULOCYTES NFR BLD AUTO: 0.3 % (ref 0–0.9)
IMM GRANULOCYTES NFR BLD AUTO: 0.3 % (ref 0–0.9)
LDLC SERPL CALC-MCNC: 83 MG/DL
LYMPHOCYTES # BLD AUTO: 1.86 K/UL (ref 1–4.8)
LYMPHOCYTES # BLD AUTO: 1.87 K/UL (ref 1–4.8)
LYMPHOCYTES NFR BLD: 30 % (ref 22–41)
LYMPHOCYTES NFR BLD: 30 % (ref 22–41)
MCH RBC QN AUTO: 30.8 PG (ref 27–33)
MCH RBC QN AUTO: 31 PG (ref 27–33)
MCHC RBC AUTO-ENTMCNC: 33.5 G/DL (ref 32.2–35.5)
MCHC RBC AUTO-ENTMCNC: 33.7 G/DL (ref 32.2–35.5)
MCV RBC AUTO: 91.9 FL (ref 81.4–97.8)
MCV RBC AUTO: 92.1 FL (ref 81.4–97.8)
MICROALBUMIN UR-MCNC: <1.2 MG/DL
MICROALBUMIN/CREAT UR: NORMAL MG/G (ref 0–30)
MONOCYTES # BLD AUTO: 0.39 K/UL (ref 0–0.85)
MONOCYTES # BLD AUTO: 0.41 K/UL (ref 0–0.85)
MONOCYTES NFR BLD AUTO: 6.3 % (ref 0–13.4)
MONOCYTES NFR BLD AUTO: 6.6 % (ref 0–13.4)
NEUTROPHILS # BLD AUTO: 3.79 K/UL (ref 1.82–7.42)
NEUTROPHILS # BLD AUTO: 3.81 K/UL (ref 1.82–7.42)
NEUTROPHILS NFR BLD: 61 % (ref 44–72)
NEUTROPHILS NFR BLD: 61.2 % (ref 44–72)
NRBC # BLD AUTO: 0 K/UL
NRBC # BLD AUTO: 0 K/UL
NRBC BLD-RTO: 0 /100 WBC (ref 0–0.2)
NRBC BLD-RTO: 0 /100 WBC (ref 0–0.2)
PLATELET # BLD AUTO: 207 K/UL (ref 164–446)
PLATELET # BLD AUTO: 264 K/UL (ref 164–446)
PMV BLD AUTO: 10.3 FL (ref 9–12.9)
PMV BLD AUTO: 10.6 FL (ref 9–12.9)
POTASSIUM SERPL-SCNC: 3.7 MMOL/L (ref 3.6–5.5)
POTASSIUM SERPL-SCNC: 3.9 MMOL/L (ref 3.6–5.5)
PROT SERPL-MCNC: 6.7 G/DL (ref 6–8.2)
RBC # BLD AUTO: 4.42 M/UL (ref 4.2–5.4)
RBC # BLD AUTO: 4.45 M/UL (ref 4.2–5.4)
SODIUM SERPL-SCNC: 141 MMOL/L (ref 135–145)
SODIUM SERPL-SCNC: 142 MMOL/L (ref 135–145)
TRIGL SERPL-MCNC: 169 MG/DL (ref 0–149)
TSH SERPL DL<=0.005 MIU/L-ACNC: 2.08 UIU/ML (ref 0.38–5.33)
WBC # BLD AUTO: 6.2 K/UL (ref 4.8–10.8)
WBC # BLD AUTO: 6.2 K/UL (ref 4.8–10.8)

## 2025-08-04 PROCEDURE — 84460 ALANINE AMINO (ALT) (SGPT): CPT

## 2025-08-04 PROCEDURE — 80048 BASIC METABOLIC PNL TOTAL CA: CPT

## 2025-08-04 PROCEDURE — 36415 COLL VENOUS BLD VENIPUNCTURE: CPT

## 2025-08-04 PROCEDURE — 85025 COMPLETE CBC W/AUTO DIFF WBC: CPT | Mod: 91

## 2025-08-04 PROCEDURE — 82043 UR ALBUMIN QUANTITATIVE: CPT

## 2025-08-04 PROCEDURE — 80053 COMPREHEN METABOLIC PANEL: CPT

## 2025-08-04 PROCEDURE — 84443 ASSAY THYROID STIM HORMONE: CPT

## 2025-08-04 PROCEDURE — 85025 COMPLETE CBC W/AUTO DIFF WBC: CPT

## 2025-08-04 PROCEDURE — 82570 ASSAY OF URINE CREATININE: CPT

## 2025-08-04 PROCEDURE — 80061 LIPID PANEL: CPT

## 2025-08-06 ENCOUNTER — HOSPITAL ENCOUNTER (EMERGENCY)
Facility: MEDICAL CENTER | Age: 50
End: 2025-08-06
Attending: EMERGENCY MEDICINE
Payer: COMMERCIAL

## 2025-08-06 ENCOUNTER — APPOINTMENT (OUTPATIENT)
Dept: RADIOLOGY | Facility: MEDICAL CENTER | Age: 50
End: 2025-08-06
Attending: EMERGENCY MEDICINE
Payer: COMMERCIAL

## 2025-08-06 ENCOUNTER — OFFICE VISIT (OUTPATIENT)
Dept: URGENT CARE | Facility: PHYSICIAN GROUP | Age: 50
End: 2025-08-06
Payer: COMMERCIAL

## 2025-08-06 VITALS
BODY MASS INDEX: 32.32 KG/M2 | TEMPERATURE: 97.9 F | HEART RATE: 92 BPM | RESPIRATION RATE: 15 BRPM | WEIGHT: 205.91 LBS | SYSTOLIC BLOOD PRESSURE: 120 MMHG | DIASTOLIC BLOOD PRESSURE: 72 MMHG | OXYGEN SATURATION: 96 % | HEIGHT: 67 IN

## 2025-08-06 VITALS
RESPIRATION RATE: 16 BRPM | TEMPERATURE: 97.5 F | SYSTOLIC BLOOD PRESSURE: 118 MMHG | WEIGHT: 205.8 LBS | HEIGHT: 67 IN | BODY MASS INDEX: 32.3 KG/M2 | DIASTOLIC BLOOD PRESSURE: 98 MMHG | OXYGEN SATURATION: 99 % | HEART RATE: 101 BPM

## 2025-08-06 DIAGNOSIS — R10.9 LEFT FLANK PAIN: ICD-10-CM

## 2025-08-06 DIAGNOSIS — N39.0 ACUTE UTI: ICD-10-CM

## 2025-08-06 DIAGNOSIS — N12 PYELONEPHRITIS: ICD-10-CM

## 2025-08-06 DIAGNOSIS — R30.0 DYSURIA: ICD-10-CM

## 2025-08-06 DIAGNOSIS — Z90.5 HISTORY OF RIGHT NEPHRECTOMY: ICD-10-CM

## 2025-08-06 DIAGNOSIS — R10.2 ABDOMINAL PAIN, SUPRAPUBIC: Primary | ICD-10-CM

## 2025-08-06 DIAGNOSIS — R10.30 LOWER ABDOMINAL PAIN: Primary | ICD-10-CM

## 2025-08-06 DIAGNOSIS — R31.0 GROSS HEMATURIA: ICD-10-CM

## 2025-08-06 LAB
ALBUMIN SERPL BCP-MCNC: 4.6 G/DL (ref 3.2–4.9)
ALBUMIN/GLOB SERPL: 1.4 G/DL
ALP SERPL-CCNC: 90 U/L (ref 30–99)
ALT SERPL-CCNC: 16 U/L (ref 2–50)
ANION GAP SERPL CALC-SCNC: 17 MMOL/L (ref 7–16)
APPEARANCE UR: ABNORMAL
APPEARANCE UR: NORMAL
AST SERPL-CCNC: 23 U/L (ref 12–45)
BACTERIA #/AREA URNS HPF: ABNORMAL /HPF
BASOPHILS # BLD AUTO: 0.6 % (ref 0–1.8)
BASOPHILS # BLD: 0.07 K/UL (ref 0–0.12)
BILIRUB SERPL-MCNC: 1 MG/DL (ref 0.1–1.5)
BILIRUB UR QL STRIP.AUTO: ABNORMAL
BILIRUB UR STRIP-MCNC: NORMAL MG/DL
BUN SERPL-MCNC: 13 MG/DL (ref 8–22)
CALCIUM ALBUM COR SERPL-MCNC: 9.7 MG/DL (ref 8.5–10.5)
CALCIUM SERPL-MCNC: 10.2 MG/DL (ref 8.5–10.5)
CASTS URNS QL MICRO: ABNORMAL /LPF (ref 0–2)
CHLORIDE SERPL-SCNC: 104 MMOL/L (ref 96–112)
CO2 SERPL-SCNC: 20 MMOL/L (ref 20–33)
COLOR UR AUTO: NORMAL
COLOR UR: YELLOW
CREAT SERPL-MCNC: 0.93 MG/DL (ref 0.5–1.4)
EOSINOPHIL # BLD AUTO: 0.05 K/UL (ref 0–0.51)
EOSINOPHIL NFR BLD: 0.4 % (ref 0–6.9)
EPITHELIAL CELLS 1715: ABNORMAL /HPF (ref 0–5)
ERYTHROCYTE [DISTWIDTH] IN BLOOD BY AUTOMATED COUNT: 43.1 FL (ref 35.9–50)
GFR SERPLBLD CREATININE-BSD FMLA CKD-EPI: 75 ML/MIN/1.73 M 2
GLOBULIN SER CALC-MCNC: 3.3 G/DL (ref 1.9–3.5)
GLUCOSE SERPL-MCNC: 80 MG/DL (ref 65–99)
GLUCOSE UR STRIP.AUTO-MCNC: NEGATIVE MG/DL
GLUCOSE UR STRIP.AUTO-MCNC: NEGATIVE MG/DL
HCG UR QL: NEGATIVE
HCT VFR BLD AUTO: 45.5 % (ref 37–47)
HGB BLD-MCNC: 15.7 G/DL (ref 12–16)
IMM GRANULOCYTES # BLD AUTO: 0.06 K/UL (ref 0–0.11)
IMM GRANULOCYTES NFR BLD AUTO: 0.5 % (ref 0–0.9)
KETONES UR STRIP.AUTO-MCNC: ABNORMAL MG/DL
KETONES UR STRIP.AUTO-MCNC: NORMAL MG/DL
LEUKOCYTE ESTERASE UR QL STRIP.AUTO: ABNORMAL
LEUKOCYTE ESTERASE UR QL STRIP.AUTO: NORMAL
LIPASE SERPL-CCNC: 33 U/L (ref 11–82)
LYMPHOCYTES # BLD AUTO: 2.65 K/UL (ref 1–4.8)
LYMPHOCYTES NFR BLD: 21.9 % (ref 22–41)
MCH RBC QN AUTO: 31 PG (ref 27–33)
MCHC RBC AUTO-ENTMCNC: 34.5 G/DL (ref 32.2–35.5)
MCV RBC AUTO: 89.9 FL (ref 81.4–97.8)
MICRO URNS: ABNORMAL
MONOCYTES # BLD AUTO: 0.73 K/UL (ref 0–0.85)
MONOCYTES NFR BLD AUTO: 6 % (ref 0–13.4)
NEUTROPHILS # BLD AUTO: 8.52 K/UL (ref 1.82–7.42)
NEUTROPHILS NFR BLD: 70.6 % (ref 44–72)
NITRITE UR QL STRIP.AUTO: NEGATIVE
NITRITE UR QL STRIP.AUTO: POSITIVE
NRBC # BLD AUTO: 0 K/UL
NRBC BLD-RTO: 0 /100 WBC (ref 0–0.2)
PH UR STRIP.AUTO: 6 [PH] (ref 5–8)
PH UR STRIP.AUTO: 6 [PH] (ref 5–8)
PLATELET # BLD AUTO: 331 K/UL (ref 164–446)
PMV BLD AUTO: 9.3 FL (ref 9–12.9)
POTASSIUM SERPL-SCNC: 3.9 MMOL/L (ref 3.6–5.5)
PROT SERPL-MCNC: 7.9 G/DL (ref 6–8.2)
PROT UR QL STRIP: 100 MG/DL
PROT UR QL STRIP: >=300 MG/DL
RBC # BLD AUTO: 5.06 M/UL (ref 4.2–5.4)
RBC # URNS HPF: ABNORMAL /HPF (ref 0–2)
RBC UR QL AUTO: ABNORMAL
RBC UR QL AUTO: NORMAL
SODIUM SERPL-SCNC: 141 MMOL/L (ref 135–145)
SP GR UR STRIP.AUTO: 1.02
SP GR UR STRIP.AUTO: >=1.03
UROBILINOGEN UR STRIP-MCNC: NORMAL MG/DL
UROBILINOGEN UR STRIP.AUTO-MCNC: 0.2 EU/DL
WBC # BLD AUTO: 12.1 K/UL (ref 4.8–10.8)
WBC #/AREA URNS HPF: >100 /HPF

## 2025-08-06 PROCEDURE — 3080F DIAST BP >= 90 MM HG: CPT | Performed by: FAMILY MEDICINE

## 2025-08-06 PROCEDURE — 85025 COMPLETE CBC W/AUTO DIFF WBC: CPT

## 2025-08-06 PROCEDURE — 700111 HCHG RX REV CODE 636 W/ 250 OVERRIDE (IP): Mod: JZ | Performed by: EMERGENCY MEDICINE

## 2025-08-06 PROCEDURE — 81025 URINE PREGNANCY TEST: CPT

## 2025-08-06 PROCEDURE — 74176 CT ABD & PELVIS W/O CONTRAST: CPT

## 2025-08-06 PROCEDURE — 96376 TX/PRO/DX INJ SAME DRUG ADON: CPT

## 2025-08-06 PROCEDURE — 96374 THER/PROPH/DIAG INJ IV PUSH: CPT

## 2025-08-06 PROCEDURE — 81001 URINALYSIS AUTO W/SCOPE: CPT

## 2025-08-06 PROCEDURE — 99284 EMERGENCY DEPT VISIT MOD MDM: CPT

## 2025-08-06 PROCEDURE — 83690 ASSAY OF LIPASE: CPT

## 2025-08-06 PROCEDURE — 3074F SYST BP LT 130 MM HG: CPT | Performed by: FAMILY MEDICINE

## 2025-08-06 PROCEDURE — 99214 OFFICE O/P EST MOD 30 MIN: CPT | Performed by: FAMILY MEDICINE

## 2025-08-06 PROCEDURE — 80053 COMPREHEN METABOLIC PANEL: CPT

## 2025-08-06 PROCEDURE — 96375 TX/PRO/DX INJ NEW DRUG ADDON: CPT

## 2025-08-06 PROCEDURE — 87086 URINE CULTURE/COLONY COUNT: CPT

## 2025-08-06 PROCEDURE — 81002 URINALYSIS NONAUTO W/O SCOPE: CPT | Performed by: FAMILY MEDICINE

## 2025-08-06 PROCEDURE — 36415 COLL VENOUS BLD VENIPUNCTURE: CPT

## 2025-08-06 RX ORDER — CEFDINIR 300 MG/1
300 CAPSULE ORAL 2 TIMES DAILY
Qty: 14 CAPSULE | Refills: 0 | Status: ACTIVE | OUTPATIENT
Start: 2025-08-06 | End: 2025-08-13

## 2025-08-06 RX ORDER — MORPHINE SULFATE 4 MG/ML
4 INJECTION INTRAVENOUS ONCE
Status: COMPLETED | OUTPATIENT
Start: 2025-08-06 | End: 2025-08-06

## 2025-08-06 RX ORDER — CEFTRIAXONE 2 G/1
2000 INJECTION, POWDER, FOR SOLUTION INTRAMUSCULAR; INTRAVENOUS ONCE
Status: COMPLETED | OUTPATIENT
Start: 2025-08-06 | End: 2025-08-06

## 2025-08-06 RX ADMIN — MORPHINE SULFATE 4 MG: 4 INJECTION, SOLUTION INTRAMUSCULAR; INTRAVENOUS at 20:04

## 2025-08-06 RX ADMIN — CEFTRIAXONE SODIUM 2000 MG: 2 INJECTION, POWDER, FOR SOLUTION INTRAMUSCULAR; INTRAVENOUS at 18:26

## 2025-08-06 RX ADMIN — MORPHINE SULFATE 4 MG: 4 INJECTION, SOLUTION INTRAMUSCULAR; INTRAVENOUS at 18:13

## 2025-08-06 ASSESSMENT — PAIN DESCRIPTION - PAIN TYPE
TYPE: ACUTE PAIN

## 2025-08-06 ASSESSMENT — FIBROSIS 4 INDEX
FIB4 SCORE: 1.08
FIB4 SCORE: 1.08

## 2025-08-06 ASSESSMENT — ENCOUNTER SYMPTOMS
DIZZINESS: 0
NAUSEA: 0
COUGH: 0
CHILLS: 0
FEVER: 0
FLANK PAIN: 1
SORE THROAT: 0
SHORTNESS OF BREATH: 0
MYALGIAS: 0
VOMITING: 0

## 2025-08-08 LAB
BACTERIA UR CULT: NORMAL
SIGNIFICANT IND 70042: NORMAL
SITE SITE: NORMAL
SOURCE SOURCE: NORMAL